# Patient Record
Sex: FEMALE | Race: WHITE | NOT HISPANIC OR LATINO | Employment: FULL TIME | URBAN - METROPOLITAN AREA
[De-identification: names, ages, dates, MRNs, and addresses within clinical notes are randomized per-mention and may not be internally consistent; named-entity substitution may affect disease eponyms.]

---

## 2017-02-15 ENCOUNTER — TELEPHONE (OUTPATIENT)
Dept: FAMILY MEDICINE | Facility: CLINIC | Age: 42
End: 2017-02-15

## 2017-02-15 DIAGNOSIS — Z30.41 ENCOUNTER FOR SURVEILLANCE OF CONTRACEPTIVE PILLS: ICD-10-CM

## 2017-02-15 RX ORDER — DESOGESTREL AND ETHINYL ESTRADIOL 0.15-0.03
1 KIT ORAL DAILY
Qty: 84 TABLET | Refills: 3 | Status: SHIPPED | OUTPATIENT
Start: 2017-02-15 | End: 2017-12-21

## 2017-02-15 NOTE — TELEPHONE ENCOUNTER
Spoke with patient  she needs refill of pres OC,  Does not want to start new one before her wedding  Is still on Apri, Rx sent  JUVENCIO Simms  RN/Malik Man

## 2017-03-09 ENCOUNTER — OFFICE VISIT (OUTPATIENT)
Dept: FAMILY MEDICINE | Facility: CLINIC | Age: 42
End: 2017-03-09
Payer: COMMERCIAL

## 2017-03-09 VITALS
HEIGHT: 62 IN | HEART RATE: 60 BPM | TEMPERATURE: 97.6 F | SYSTOLIC BLOOD PRESSURE: 102 MMHG | WEIGHT: 108.6 LBS | BODY MASS INDEX: 19.98 KG/M2 | DIASTOLIC BLOOD PRESSURE: 64 MMHG

## 2017-03-09 DIAGNOSIS — Z13.6 CARDIOVASCULAR SCREENING; LDL GOAL LESS THAN 160: ICD-10-CM

## 2017-03-09 DIAGNOSIS — Z80.3 FAMILY HISTORY OF MALIGNANT NEOPLASM OF BREAST: ICD-10-CM

## 2017-03-09 DIAGNOSIS — Z01.419 ENCOUNTER FOR GYNECOLOGICAL EXAMINATION WITHOUT ABNORMAL FINDING: Primary | ICD-10-CM

## 2017-03-09 DIAGNOSIS — L30.9 DERMATITIS: ICD-10-CM

## 2017-03-09 DIAGNOSIS — Z30.09 ENCOUNTER FOR OTHER GENERAL COUNSELING OR ADVICE ON CONTRACEPTION: ICD-10-CM

## 2017-03-09 DIAGNOSIS — Z30.41 ENCOUNTER FOR SURVEILLANCE OF CONTRACEPTIVE PILLS: ICD-10-CM

## 2017-03-09 LAB
ANION GAP SERPL CALCULATED.3IONS-SCNC: 6 MMOL/L (ref 3–14)
BUN SERPL-MCNC: 16 MG/DL (ref 7–30)
CALCIUM SERPL-MCNC: 8.4 MG/DL (ref 8.5–10.1)
CHLORIDE SERPL-SCNC: 103 MMOL/L (ref 94–109)
CHOLEST SERPL-MCNC: 171 MG/DL
CO2 SERPL-SCNC: 27 MMOL/L (ref 20–32)
CREAT SERPL-MCNC: 0.68 MG/DL (ref 0.52–1.04)
GFR SERPL CREATININE-BSD FRML MDRD: ABNORMAL ML/MIN/1.7M2
GLUCOSE SERPL-MCNC: 88 MG/DL (ref 70–99)
HDLC SERPL-MCNC: 51 MG/DL
LDLC SERPL CALC-MCNC: 101 MG/DL
NONHDLC SERPL-MCNC: 120 MG/DL
POTASSIUM SERPL-SCNC: 3.8 MMOL/L (ref 3.4–5.3)
SODIUM SERPL-SCNC: 136 MMOL/L (ref 133–144)
TRIGL SERPL-MCNC: 96 MG/DL

## 2017-03-09 PROCEDURE — 36415 COLL VENOUS BLD VENIPUNCTURE: CPT | Performed by: NURSE PRACTITIONER

## 2017-03-09 PROCEDURE — 80061 LIPID PANEL: CPT | Performed by: NURSE PRACTITIONER

## 2017-03-09 PROCEDURE — G0124 SCREEN C/V THIN LAYER BY MD: HCPCS | Performed by: NURSE PRACTITIONER

## 2017-03-09 PROCEDURE — 80048 BASIC METABOLIC PNL TOTAL CA: CPT | Performed by: NURSE PRACTITIONER

## 2017-03-09 PROCEDURE — 87624 HPV HI-RISK TYP POOLED RSLT: CPT | Performed by: NURSE PRACTITIONER

## 2017-03-09 PROCEDURE — G0145 SCR C/V CYTO,THINLAYER,RESCR: HCPCS | Performed by: NURSE PRACTITIONER

## 2017-03-09 PROCEDURE — 99396 PREV VISIT EST AGE 40-64: CPT | Performed by: NURSE PRACTITIONER

## 2017-03-09 RX ORDER — TRIAMCINOLONE ACETONIDE 1 MG/G
CREAM TOPICAL
Qty: 30 G | Refills: 0 | Status: SHIPPED | OUTPATIENT
Start: 2017-03-09 | End: 2017-12-22

## 2017-03-09 NOTE — PATIENT INSTRUCTIONS
Preventive Health Recommendations  Female Ages 40 to 49    Yearly exam:     See your health care provider every year in order to  1. Review health changes.   2. Discuss preventive care.    3. Review your medicines if your doctor prescribed any.      Get a Pap test every three years (unless you have an abnormal result and your provider advises testing more often).      If you get Pap tests with HPV test, you only need to test every 5 years, unless you have an abnormal result. You do not need a Pap test if your uterus was removed (hysterectomy) and you have not had cancer.      You should be tested each year for STDs (sexually transmitted diseases), if you're at risk.       Ask your doctor if you should have a mammogram.      Have a colonoscopy (test for colon cancer) if someone in your family has had colon cancer or polyps before age 50.       Have a cholesterol test every 5 years.       Have a diabetes test (fasting glucose) after age 45. If you are at risk for diabetes, you should have this test every 3 years.    Shots: Get a flu shot each year. Get a tetanus shot every 10 years.     Nutrition:     Eat at least 5 servings of fruits and vegetables each day.    Eat whole-grain bread, whole-wheat pasta and brown rice instead of white grains and rice.    Talk to your provider about Calcium and Vitamin D.     Lifestyle    Exercise at least 150 minutes a week (an average of 30 minutes a day, 5 days a week). This will help you control your weight and prevent disease.    Limit alcohol to one drink per day.    No smoking.     Wear sunscreen to prevent skin cancer.    See your dentist every six months for an exam and cleaning.    You do have a referral for Derm  To check the rash on the chest.

## 2017-03-09 NOTE — PROGRESS NOTES
SUBJECTIVE:     CC: Jessie Karimi is an 41 year old woman who presents for preventive health visit.   Answers for HPI/ROS submitted by the patient on 3/6/2017   Annual Exam:  Getting at least 3 servings of Calcium per day:: Yes  Bi-annual eye exam:: NO  Dental care twice a year:: Yes  Sleep apnea or symptoms of sleep apnea:: None  Frequency of exercise:: 2-3 days/week  Taking medications regularly:: Yes  Medication side effects:: Not applicable  Additional concerns today:: No  Q1: Little interest or pleasure in doing things: 0=Not at all  Q2: Feeling down, depressed or hopeless: 0=Not at all  PHQ-2 Score: 0  Duration of exercise:: 45-60 minutes    *Is fasting.     *No healthcare concerns.     Today's PHQ-2 Score:   PHQ-2 ( 1999 [employer]) 3/9/2017 3/6/2017   Q1: Little interest or pleasure in doing things 0 -   Q2: Feeling down, depressed or hopeless 0 -   PHQ-2 Score 0 -   Little interest or pleasure in doing things - Not at all   Feeling down, depressed or hopeless - Not at all   PHQ-2 Score - 0       Abuse: Current or Past(Physical, Sexual or Emotional)- No  Do you feel safe in your environment - Yes    Social History   Substance Use Topics     Smoking status: Never Smoker     Smokeless tobacco: Never Used     Alcohol use Yes      Comment: occasionaly     The patient does not drink >3 drinks per day nor >7 drinks per week.    Recent Labs   Lab Test  03/04/16   0840  03/13/15   0825  02/20/13   1000   CHOL  176  144  178   HDL  67  66  64   LDL  84  54  99   TRIG  127  119  74   CHOLHDLRATIO   --   2.2  3.0   NHDL  109   --    --        Reviewed orders with patient.  Reviewed health maintenance and updated orders accordingly - Yes    Mammo Decision Support:  Patient under age 50, mutual decision reflected in health maintenance.      Pertinent mammograms are reviewed under the imaging tab.  History of abnormal Pap smear: YES - updated in Problem List and Health Maintenance accordingly    Reviewed and updated  as needed this visit by clinical staff  Tobacco  Allergies  Med Hx  Surg Hx  Fam Hx  Soc Hx        Reviewed and updated as needed this visit by Provider            ROS:  C: NEGATIVE for fever, chills, change in weight  INTEGUMENTARY/SKIN: rash on her chest that doesn't go away   E: NEGATIVE for vision changes or irritation  ENT: NEGATIVE for ear, mouth and throat problems  R: NEGATIVE for significant cough or SOB  B: NEGATIVE for masses, tenderness or discharge  CV: NEGATIVE for chest pain, palpitations or peripheral edema  GI: NEGATIVE for nausea, abdominal pain, heartburn, or change in bowel habits  : NEGATIVE for unusual urinary or vaginal symptoms. Periods are regular.   female: no unusual urinary symptoms, no unusual vaginal symptoms   M: NEGATIVE for significant arthralgias or myalgia  N: NEGATIVE for weakness, dizziness or paresthesias  E: NEGATIVE for temperature intolerance, skin/hair changes  H: NEGATIVE for bleeding problems  P: NEGATIVE for changes in mood or affect    BP Readings from Last 3 Encounters:   03/09/17 102/64   03/22/16 106/69   03/04/16 104/64    Wt Readings from Last 3 Encounters:   03/09/17 108 lb 9.6 oz (49.3 kg)   03/22/16 113 lb 3.2 oz (51.3 kg)   03/04/16 109 lb 9.6 oz (49.7 kg)                  Patient Active Problem List   Diagnosis     Other acne     Encounter for other general counseling or advice on contraception     Allergic     CARDIOVASCULAR SCREENING; LDL GOAL LESS THAN 160     Environmental allergies     Health Care Home     Tree nut allergy     Encounter for surveillance of contraceptive pills     ASCUS with positive high risk HPV     DUB (dysfunctional uterine bleeding)     Past Surgical History   Procedure Laterality Date     C nonspecific procedure       wisdom teeth removed     Biopsy  April 2013     moles removed by Naz and biopsied; benign       Social History   Substance Use Topics     Smoking status: Never Smoker     Smokeless tobacco: Never Used      "Alcohol use Yes      Comment: occasionaly     Family History   Problem Relation Age of Onset     C.A.D. Maternal Grandmother      CEREBROVASCULAR DISEASE Maternal Grandmother      DIABETES Father      Type II     DIABETES Mother      Type II     Breast Cancer Mother      Diagnosed Aug 2012; double mast + chem; remiss.     Heart Failure Mother      DIABETES Maternal Uncle      Hypertension No family hx of      Prostate Cancer No family hx of      Cancer - colorectal No family hx of          Current Outpatient Prescriptions   Medication Sig Dispense Refill     triamcinolone (KENALOG) 0.1 % cream Apply sparingly to affected area three times daily for 14 days. 30 g 0     desogestrel-ethinyl estradiol (APRI) 0.15-30 MG-MCG per tablet Take 1 tablet by mouth daily . Appt Due 3/20/15. 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 45 tablet 2     Cholecalciferol (VITAMIN D PO) Take 1,000 Units by mouth daily       Ascorbic Acid (VITAMIN C PO) Take by mouth daily       CRANBERRY PO Take by mouth daily       ZYRTEC 10 MG OR TABS 1 TABLET DAILY 30 0     EPINEPHrine (EPIPEN) 0.3 MG/0.3ML injection Inject 0.3 mLs (0.3 mg) into the muscle once as needed for anaphylaxis (Patient not taking: Reported on 3/9/2017) 1 each 2     EPINEPHrine (EPIPEN) 0.3 MG/0.3ML injection Inject 0.3 mLs (0.3 mg) into the muscle once as needed for anaphylaxis Adult dose (Patient not taking: Reported on 3/9/2017) 2 each 1     Allergies   Allergen Reactions     Quinolones      OBJECTIVE:     /64 (BP Location: Left arm, Patient Position: Chair, Cuff Size: Adult Regular)  Pulse 60  Temp 97.6  F (36.4  C) (Tympanic)  Ht 5' 1.5\" (1.562 m)  Wt 108 lb 9.6 oz (49.3 kg)  LMP 02/12/2017 (Exact Date)  Breastfeeding? No  BMI 20.19 kg/m2  EXAM:  GENERAL: healthy, alert and no distress  EYES: Eyes grossly normal to " inspection, PERRL and conjunctivae and sclerae normal  HENT: ear canals and TM's normal, nose and mouth without ulcers or lesions  NECK: no adenopathy, no asymmetry, masses, or scars and thyroid normal to palpation  RESP: lungs clear to auscultation - no rales, rhonchi or wheezes  BREAST: normal without masses, tenderness or nipple discharge and no palpable axillary masses or adenopathy  CV: regular rate and rhythm, normal S1 S2, no S3 or S4, no murmur, click or rub, no peripheral edema and peripheral pulses strong  ABDOMEN: soft, nontender, no hepatosplenomegaly, no masses and bowel sounds normal   (female): normal female external genitalia, normal urethral meatus, vaginal mucosa pink, moist, well rugated, and normal cervix/adnexa/uterus without masses or discharge  MS: no gross musculoskeletal defects noted, no edema  SKIN: does have a red rash on the chest.   NEURO: Normal strength and tone, mentation intact and speech normal  PSYCH: mentation appears normal, affect normal/bright  LYMPH: no cervical, supraclavicular, axillary, or inguinal adenopathy    ASSESSMENT/PLAN:       ASSESSMENT/PLAN:      ICD-10-CM    1. Encounter for gynecological examination without abnormal finding Z01.419 Pap imaged thin layer screen with HPV - recommended age 30 - 65     HPV High Risk Types DNA Cervical     Basic metabolic panel  (Ca, Cl, CO2, Creat, Gluc, K, Na, BUN)   2. Dermatitis L30.9 triamcinolone (KENALOG) 0.1 % cream     DERMATOLOGY REFERRAL   3. CARDIOVASCULAR SCREENING; LDL GOAL LESS THAN 160 Z13.6 Basic metabolic panel  (Ca, Cl, CO2, Creat, Gluc, K, Na, BUN)     Lipid Profile with reflex to direct LDL   4. Encounter for other general counseling or advice on contraception Z30.09    5. Encounter for surveillance of contraceptive pills Z30.41    6. Family history of malignant neoplasm of breast Z80.3 *MA Screening Digital Bilateral       Patient Instructions     Preventive Health Recommendations  Female Ages 40 to  49    Yearly exam:     See your health care provider every year in order to  1. Review health changes.   2. Discuss preventive care.    3. Review your medicines if your doctor prescribed any.      Get a Pap test every three years (unless you have an abnormal result and your provider advises testing more often).      If you get Pap tests with HPV test, you only need to test every 5 years, unless you have an abnormal result. You do not need a Pap test if your uterus was removed (hysterectomy) and you have not had cancer.      You should be tested each year for STDs (sexually transmitted diseases), if you're at risk.       Ask your doctor if you should have a mammogram.      Have a colonoscopy (test for colon cancer) if someone in your family has had colon cancer or polyps before age 50.       Have a cholesterol test every 5 years.       Have a diabetes test (fasting glucose) after age 45. If you are at risk for diabetes, you should have this test every 3 years.    Shots: Get a flu shot each year. Get a tetanus shot every 10 years.     Nutrition:     Eat at least 5 servings of fruits and vegetables each day.    Eat whole-grain bread, whole-wheat pasta and brown rice instead of white grains and rice.    Talk to your provider about Calcium and Vitamin D.     Lifestyle    Exercise at least 150 minutes a week (an average of 30 minutes a day, 5 days a week). This will help you control your weight and prevent disease.    Limit alcohol to one drink per day.    No smoking.     Wear sunscreen to prevent skin cancer.    See your dentist every six months for an exam and cleaning.    You do have a referral for Derm  To check the rash on the chest.              '    COUNSELING:   Reviewed preventive health counseling, as reflected in patient instructions       Regular exercise       Healthy diet/nutrition       Contraception         reports that she has never smoked. She has never used smokeless tobacco.    Estimated body mass  "index is 20.19 kg/(m^2) as calculated from the following:    Height as of this encounter: 5' 1.5\" (1.562 m).    Weight as of this encounter: 108 lb 9.6 oz (49.3 kg).       Counseling Resources:  ATP IV Guidelines  Pooled Cohorts Equation Calculator  Breast Cancer Risk Calculator  FRAX Risk Assessment  ICSI Preventive Guidelines  Dietary Guidelines for Americans, 2010  USDA's MyPlate  ASA Prophylaxis  Lung CA Screening    CINDY MARTINEZ NP, APRN CNP  [facility]  "

## 2017-03-09 NOTE — MR AVS SNAPSHOT
After Visit Summary   3/9/2017    Jessie Karimi    MRN: [id number]           Patient Information     Date Of Birth          1975        Visit Information        Provider Department      3/9/2017 8:40 AM Naz Ríos APRN [facility]        Today's Diagnoses     Encounter for gynecological examination without abnormal finding    -  1    Dermatitis        CARDIOVASCULAR SCREENING; LDL GOAL LESS THAN 160        Encounter for other general counseling or advice on contraception        Encounter for surveillance of contraceptive pills        Family history of malignant neoplasm of breast          Care Instructions      Preventive Health Recommendations  Female Ages 40 to 49    Yearly exam:     See your health care provider every year in order to  1. Review health changes.   2. Discuss preventive care.    3. Review your medicines if your doctor prescribed any.      Get a Pap test every three years (unless you have an abnormal result and your provider advises testing more often).      If you get Pap tests with HPV test, you only need to test every 5 years, unless you have an abnormal result. You do not need a Pap test if your uterus was removed (hysterectomy) and you have not had cancer.      You should be tested each year for STDs (sexually transmitted diseases), if you're at risk.       Ask your doctor if you should have a mammogram.      Have a colonoscopy (test for colon cancer) if someone in your family has had colon cancer or polyps before age 50.       Have a cholesterol test every 5 years.       Have a diabetes test (fasting glucose) after age 45. If you are at risk for diabetes, you should have this test every 3 years.    Shots: Get a flu shot each year. Get a tetanus shot every 10 years.     Nutrition:     Eat at least 5 servings of fruits and vegetables each day.    Eat whole-grain bread, whole-wheat pasta and brown rice instead of white grains and  rice.    Talk to your provider about Calcium and Vitamin D.     Lifestyle    Exercise at least 150 minutes a week (an average of 30 minutes a day, 5 days a week). This will help you control your weight and prevent disease.    Limit alcohol to one drink per day.    No smoking.     Wear sunscreen to prevent skin cancer.    See your dentist every six months for an exam and cleaning.    You do have a referral for Derm  To check the rash on the chest.              Follow-ups after your visit        Additional Services     DERMATOLOGY REFERRAL       Your provider has referred you to: [facility]: [facility] [phone]   [email or web address]  [facility]: [facility] [place] [phone]  [email or web address]  [facility]: [facility] [phone]   [email or web address]    Please be aware that coverage of these services is subject to the terms and limitations of your health insurance plan.  Call member services at your health plan with any benefit or coverage questions.      Please bring the following with you to your appointment:    (1) Any X-Rays, CTs or MRIs which have been performed.  Contact the facility where they were done to arrange for  prior to your scheduled appointment.    (2) List of current medications  (3) This referral request   (4) Any documents/labs given to you for this referral                  Future tests that were ordered for you today     Open Future Orders        Priority Expected Expires Ordered    *MA Screening Digital Bilateral Routine  3/9/2018 3/9/2017            Who to contact     Normal or non-critical lab and imaging results will be communicated to you by MyChart, letter or phone within 4 business days after the clinic has received the results. If you do not hear from us within 7 days, please contact the clinic through MyChart or phone. If you have a critical or abnormal lab result, we  "will notify you by phone as soon as possible.  Submit refill requests through [employer] or call your pharmacy and they will forward the refill request to us. Please allow 3 business days for your refill to be completed.          If you need to speak with a  for additional information , please call: [phone]           Additional Information About Your Visit        [facility] Information     [employer] gives you secure access to your electronic health record. If you see a primary care provider, you can also send messages to your care team and make appointments. If you have questions, please call your primary care clinic.  If you do not have a primary care provider, please call [phone] and they will assist you.        Care EveryWhere ID     This is your Care EveryWhere ID. This could be used by other organizations to access your [place] medical records  [id number]        Your Vitals Were     Pulse Temperature Height Last Period Breastfeeding? BMI (Body Mass Index)    60 97.6  F (36.4  C) (Tympanic) 5' 1.5\" (1.562 m) 02/12/2017 (Exact Date) No 20.19 kg/m2       Blood Pressure from Last 3 Encounters:   03/09/17 102/64   03/22/16 106/69   03/04/16 104/64    Weight from Last 3 Encounters:   03/09/17 108 lb 9.6 oz (49.3 kg)   03/22/16 113 lb 3.2 oz (51.3 kg)   03/04/16 109 lb 9.6 oz (49.7 kg)              We Performed the Following     Basic metabolic panel  (Ca, Cl, CO2, Creat, Gluc, K, Na, BUN)     DERMATOLOGY REFERRAL     HPV High Risk Types DNA Cervical     Lipid Profile with reflex to direct LDL     Pap imaged thin layer screen with HPV - recommended age 30 - 65          Today's Medication Changes          These changes are accurate as of: 3/9/17  9:41 AM.  If you have any questions, ask your nurse or doctor.               Start taking these medicines.        Dose/Directions    triamcinolone 0.1 % cream   Commonly known as:  KENALOG   Used for:  Dermatitis   Started by:  Naz Ríos, " APRN CNP        Apply sparingly to affected area three times daily for 14 days.   Quantity:  30 g   Refills:  0            Where to get your medicines      These medications were sent to [facility] [id number] [facility] - [place], [place] - [address]  [address], [place] [address]     Phone:  [phone]     triamcinolone 0.1 % cream                Primary Care Provider Office Phone # Fax #    Naz Morris GEORGES Lemon [phone]       [facility] [address]   [place] [address]        Thank you!     Thank you for choosing [facility]  for your care. Our goal is always to provide you with excellent care. Hearing back from our patients is one way we can continue to improve our services. Please take a few minutes to complete the written survey that you may receive in the mail after your visit with us. Thank you!             Your Updated Medication List - Protect others around you: Learn how to safely use, store and throw away your medicines at www.disposemymeds.org.          This list is accurate as of: 3/9/17  9:41 AM.  Always use your most recent med list.                   Brand Name Dispense Instructions for use    CRANBERRY PO      Take by mouth daily       desogestrel-ethinyl estradiol 0.15-30 MG-MCG per tablet    APRI    84 tablet    Take 1 tablet by mouth daily . Appt Due 3/20/15.       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       VITAMIN C PO      Take by mouth daily       VITAMIN D PO      Take 1,000 Units by mouth daily       ZYRTEC 10 MG tablet   Generic drug:  cetirizine     30    1 TABLET DAILY       * Notice:  This list has 2 medication(s) that are the same as other medications prescribed for you. Read the directions carefully, and ask your doctor or other care provider to review them with you.

## 2017-03-09 NOTE — NURSING NOTE
"Chief Complaint   Patient presents with     Physical       Initial /64 (BP Location: Left arm, Patient Position: Chair, Cuff Size: Adult Regular)  Pulse 60  Temp 97.6  F (36.4  C) (Tympanic)  Ht 5' 1.5\" (1.562 m)  Wt 108 lb 9.6 oz (49.3 kg)  LMP 02/12/2017 (Exact Date)  Breastfeeding? No  BMI 20.19 kg/m2 Estimated body mass index is 20.19 kg/(m^2) as calculated from the following:    Height as of this encounter: 5' 1.5\" (1.562 m).    Weight as of this encounter: 108 lb 9.6 oz (49.3 kg).  Medication Reconciliation: complete     Era Bowden CMA (AAMA)      "

## 2017-03-15 LAB
COPATH REPORT: ABNORMAL
PAP: ABNORMAL

## 2017-03-17 LAB
FINAL DIAGNOSIS: NORMAL
HPV HR 12 DNA CVX QL NAA+PROBE: NEGATIVE
HPV16 DNA SPEC QL NAA+PROBE: NEGATIVE
HPV18 DNA SPEC QL NAA+PROBE: NEGATIVE
SPECIMEN DESCRIPTION: NORMAL

## 2017-03-27 DIAGNOSIS — F41.8 SITUATIONAL ANXIETY: ICD-10-CM

## 2017-03-27 NOTE — TELEPHONE ENCOUNTER
Propranolol  20mg      Last Written Prescription Date: 12/28/2016 #45 x 2  Last filled 02/22/2017  Last Office Visit with [facility], [facility] or [facility] prescribing provider:  03/09/2017 EJ Ríos   Future Office Visit:    none    BP Readings from Last 3 Encounters:   03/09/17 102/64   03/22/16 106/69   03/04/16 104/64

## 2017-03-28 RX ORDER — PROPRANOLOL HYDROCHLORIDE 20 MG/1
20 TABLET ORAL 2 TIMES DAILY
Qty: 45 TABLET | Refills: 2 | Status: SHIPPED | OUTPATIENT
Start: 2017-03-28 | End: 2017-08-19

## 2017-03-28 NOTE — TELEPHONE ENCOUNTER
Prescription approved per [facility] Refill Protocol or patient Primary care provider (PCP)  JUVENCIO Simms RN/Malik Man

## 2017-05-05 ENCOUNTER — TELEPHONE (OUTPATIENT)
Dept: FAMILY MEDICINE | Facility: CLINIC | Age: 42
End: 2017-05-05

## 2017-05-05 NOTE — TELEPHONE ENCOUNTER
Patient returned our call, and I read the message to her. She will check with her pharmacy, with the Lot # on her pen.  Rose Marie Dolan  [facility] [place] [facility]

## 2017-05-05 NOTE — TELEPHONE ENCOUNTER
Called patient to have her check with her pharmacy if her most recent Epipen was part of the recent recall. I left a message for her to return my call.  She will need the lot# when she calls the pharmacy.   Betty Rubi CMA

## 2017-05-17 ENCOUNTER — OFFICE VISIT (OUTPATIENT)
Dept: OBGYN | Facility: CLINIC | Age: 42
End: 2017-05-17
Payer: COMMERCIAL

## 2017-05-17 VITALS
BODY MASS INDEX: 19.7 KG/M2 | DIASTOLIC BLOOD PRESSURE: 76 MMHG | TEMPERATURE: 98.2 F | WEIGHT: 106 LBS | HEART RATE: 90 BPM | SYSTOLIC BLOOD PRESSURE: 117 MMHG

## 2017-05-17 DIAGNOSIS — R87.612 PAPANICOLAOU SMEAR OF CERVIX WITH LOW GRADE SQUAMOUS INTRAEPITHELIAL LESION (LGSIL): Primary | ICD-10-CM

## 2017-05-17 DIAGNOSIS — R87.810 ASCUS WITH POSITIVE HIGH RISK HPV CERVICAL: ICD-10-CM

## 2017-05-17 DIAGNOSIS — R87.610 ASCUS WITH POSITIVE HIGH RISK HPV CERVICAL: ICD-10-CM

## 2017-05-17 LAB — BETA HCG QUAL IFA URINE: NEGATIVE

## 2017-05-17 PROCEDURE — 88305 TISSUE EXAM BY PATHOLOGIST: CPT | Performed by: OBSTETRICS & GYNECOLOGY

## 2017-05-17 PROCEDURE — 84703 CHORIONIC GONADOTROPIN ASSAY: CPT | Performed by: OBSTETRICS & GYNECOLOGY

## 2017-05-17 PROCEDURE — 57456 ENDOCERV CURETTAGE W/SCOPE: CPT | Performed by: OBSTETRICS & GYNECOLOGY

## 2017-05-17 NOTE — NURSING NOTE
"Chief Complaint   Patient presents with     Colposcopy       Initial /76  Pulse 90  Temp 98.2  F (36.8  C) (Oral)  Wt 106 lb (48.1 kg)  LMP 05/07/2017  BMI 19.7 kg/m2 Estimated body mass index is 19.7 kg/(m^2) as calculated from the following:    Height as of 3/9/17: 5' 1.5\" (1.562 m).    Weight as of this encounter: 106 lb (48.1 kg).  BP completed using cuff size: regular    No obstetric history on file.    The following HM Due: NONE      The following patient reported/Care Every where data was sent to:  P ABSTRACT QUALITY INITIATIVES [[id number]]  na     n/a             "

## 2017-05-17 NOTE — PROGRESS NOTES
GYN CLINIC VISIT  5/17/2017  CC: colposcopy    HPI:  Jessie Karimi is a 41 year old female No obstetric history on file. who presents for initial colposcopy, referred by Naz burroughs. Pap smear on 03/09/2017 showed: LSIL. The prior pap showed ASCUS and with high risk HPV present: +HPV other.       Patient's last menstrual period was 05/07/2017.  UPT today is negative  Patient does not smoke  Type of contraception: oral contraceptive  Age at first sexual intercourse: 20  Number of sexual partners (lifetime): 25  Past GYN history: No STD history and HPV  Prior cervical/vaginal disease: Normal exam without visible pathology.  Prior cervical treatment: no treatment.    Vitals:    05/17/17 1625   BP: 117/76   Pulse: 90   Temp: 98.2  F (36.8  C)   TempSrc: Oral   Weight: 106 lb (48.1 kg)         PROCEDURE:  Before the procedure, it was ensured that the patient was educated regarding the nature of her findings to date, the implications, and what was to be done. She has been made aware of the role of HPV, the natural history of infection, ways to minimize her future risk, the effect of HPV on the cervix, and treatment options available should they be indicated. The details of the colposcopic procedure were reviewed. All questions were answered before proceeding, and informed consent was therefore obtained.      Medium angi peculum placed in vagina and excellent visualization of cervix acheived, cervix swabbed x 3 with acetic acid solution.      FINDINGS:  Cervix: no visible lesions. Nulliparous cervix. Unable to see SCJ.   SCJ seen?: no   ECC done?: Yes - ECC performed because could not see SCJ  Satisfactory examination?: no      ASSESSMENT: Normal exam without visible pathology. ECC performed because could not see SCJ  PLAN: specimens labelled and sent to Pathology, will base further treatment on Pathology findings, treatment options discussed with patient, post biopsy instructions given to patient and call to  discuss Pathology results      Lala Lara MD

## 2017-05-17 NOTE — MR AVS SNAPSHOT
After Visit Summary   5/17/2017    Jessie Karimi    MRN: [id number]           Patient Information     Date Of Birth          1975        Visit Information        Provider Department      5/17/2017 4:30 PM Lala Lara MD; [facility]        Today's Diagnoses     Papanicolaou smear of cervix with low grade squamous intraepithelial lesion (LGSIL)    -  1    ASCUS with positive high risk HPV cervical           Follow-ups after your visit        Who to contact     If you have questions or need follow up information about today's clinic visit or your schedule please contact [facility] directly at [phone].  Normal or non-critical lab and imaging results will be communicated to you by MyChart, letter or phone within 4 business days after the clinic has received the results. If you do not hear from us within 7 days, please contact the clinic through MyChart or phone. If you have a critical or abnormal lab result, we will notify you by phone as soon as possible.  Submit refill requests through [employer] or call your pharmacy and they will forward the refill request to us. Please allow 3 business days for your refill to be completed.          Additional Information About Your Visit        MyChart Information     [employer] gives you secure access to your electronic health record. If you see a primary care provider, you can also send messages to your care team and make appointments. If you have questions, please call your primary care clinic.  If you do not have a primary care provider, please call [phone] and they will assist you.        Care EveryWhere ID     This is your Care EveryWhere ID. This could be used by other organizations to access your [facility] medical records  [id number]        Your Vitals Were     Pulse Temperature Last Period BMI (Body Mass Index)          90 98.2  F (36.8  C) (Oral) 05/07/2017 19.7 kg/m2         Blood  Pressure from Last 3 Encounters:   05/17/17 117/76   03/09/17 102/64   03/22/16 106/69    Weight from Last 3 Encounters:   05/17/17 106 lb (48.1 kg)   03/09/17 108 lb 9.6 oz (49.3 kg)   03/22/16 113 lb 3.2 oz (51.3 kg)              We Performed the Following     Beta HCG qual IFA urine     COLP CERVIX/UPPER VAGINA W ENDOCERV CURETT     Surgical pathology exam          Today's Medication Changes          These changes are accurate as of: 5/17/17  5:55 PM.  If you have any questions, ask your nurse or doctor.               These medicines have changed or have updated prescriptions.        Dose/Directions    EPINEPHrine 0.3 MG/0.3ML injection   Commonly known as:  EPIPEN   This may have changed:  Another medication with the same name was removed. Continue taking this medication, and follow the directions you see here.   Used for:  Allergic   Changed by:  Naz Ríos APRN CNP        Dose:  0.3 mg   Inject 0.3 mLs (0.3 mg) into the muscle once as needed for anaphylaxis Adult dose   Quantity:  2 each   Refills:  1                Primary Care Provider Office Phone # Fax #    GEORGES Garza [phone]       [facility] [address]   [place] [address]        Thank you!     Thank you for choosing [facility]  for your care. Our goal is always to provide you with excellent care. Hearing back from our patients is one way we can continue to improve our services. Please take a few minutes to complete the written survey that you may receive in the mail after your visit with us. Thank you!             Your Updated Medication List - Protect others around you: Learn how to safely use, store and throw away your medicines at www.disposemymeds.org.          This list is accurate as of: 5/17/17  5:55 PM.  Always use your most recent med list.                   Brand Name Dispense Instructions for use    CRANBERRY PO      Take by mouth daily        desogestrel-ethinyl estradiol 0.15-30 MG-MCG per tablet    APRI    84 tablet    Take 1 tablet by mouth daily . Appt Due 3/20/15.       EPINEPHrine 0.3 MG/0.3ML injection    EPIPEN    2 each    Inject 0.3 mLs (0.3 mg) into the muscle once as needed for anaphylaxis Adult dose       propranolol 20 MG tablet    INDERAL    45 tablet    Take 1 tablet (20 mg) by mouth 2 times daily Take 1 tablet  30-60 min before a speaking event       triamcinolone 0.1 % cream    KENALOG    30 g    Apply sparingly to affected area three times daily for 14 days.       VITAMIN C PO      Take by mouth daily       VITAMIN D PO      Take 1,000 Units by mouth daily       ZYRTEC 10 MG tablet   Generic drug:  cetirizine     30    1 TABLET DAILY

## 2017-05-19 LAB — COPATH REPORT: NORMAL

## 2017-08-07 ENCOUNTER — MYC MEDICAL ADVICE (OUTPATIENT)
Dept: FAMILY MEDICINE | Facility: CLINIC | Age: 42
End: 2017-08-07

## 2017-08-07 DIAGNOSIS — B37.31 YEAST INFECTION OF THE VAGINA: Primary | ICD-10-CM

## 2017-08-07 RX ORDER — FLUCONAZOLE 150 MG/1
150 TABLET ORAL ONCE
Qty: 2 TABLET | Refills: 0 | Status: SHIPPED | OUTPATIENT
Start: 2017-08-07 | End: 2017-08-07

## 2017-08-07 NOTE — TELEPHONE ENCOUNTER
Patient is calling back to see what we cant do to help her with her yeast infection.    Rose Marie Verde, [facility]

## 2017-08-19 DIAGNOSIS — F41.8 SITUATIONAL ANXIETY: ICD-10-CM

## 2017-08-21 RX ORDER — PROPRANOLOL HYDROCHLORIDE 20 MG/1
TABLET ORAL
Qty: 45 TABLET | Refills: 2 | Status: SHIPPED | OUTPATIENT
Start: 2017-08-21 | End: 2017-10-29

## 2017-08-21 NOTE — TELEPHONE ENCOUNTER
Propranolol  20mg      Last Written Prescription Date: 03/28/2017  #45 x 2  Last filled 07/25/2017  Last Office Visit with [facility], [facility] or [facility] prescribing provider:  03/09/2017 EJ Ríos   Future Office Visit:    none    BP Readings from Last 3 Encounters:   05/17/17 117/76   03/09/17 102/64   03/22/16 106/69

## 2017-10-10 DIAGNOSIS — Z91.018 TREE NUT ALLERGY: ICD-10-CM

## 2017-10-10 NOTE — TELEPHONE ENCOUNTER
Epi Pen      Last Written Prescription Date: 02/28/2014 #2 x 1  Last filled 08/05/2016  Last Office Visit with [facility], [facility] or [facility] prescribing provider: 03/09/2017 EJ Ríos

## 2017-10-11 RX ORDER — EPINEPHRINE 0.3 MG/.3ML
INJECTION INTRAMUSCULAR
Qty: 0.6 ML | Refills: 0 | Status: SHIPPED | OUTPATIENT
Start: 2017-10-11 | End: 2018-05-11

## 2017-10-11 NOTE — TELEPHONE ENCOUNTER
Prescription approved per [facility] Refill Protocol or patient Primary care provider (PCP)  JUVENCIO Simms RN/Malik Man

## 2017-10-12 DIAGNOSIS — Z91.018 TREE NUT ALLERGY: Primary | ICD-10-CM

## 2017-10-12 RX ORDER — EPINEPHRINE 0.3 MG/.3ML
0.3 INJECTION SUBCUTANEOUS PRN
Qty: 0.6 ML | Refills: 1 | Status: SHIPPED | OUTPATIENT
Start: 2017-10-12 | End: 2018-05-11

## 2017-12-13 ENCOUNTER — E-VISIT (OUTPATIENT)
Dept: FAMILY MEDICINE | Facility: CLINIC | Age: 42
End: 2017-12-13
Payer: COMMERCIAL

## 2017-12-13 DIAGNOSIS — B37.31 YEAST INFECTION OF THE VAGINA: Primary | ICD-10-CM

## 2017-12-13 PROCEDURE — 99207 ZZC NO BILLABLE SERVICE THIS VISIT: CPT | Performed by: NURSE PRACTITIONER

## 2017-12-13 NOTE — MR AVS SNAPSHOT
After Visit Summary   12/13/2017    Jessie Karimi    MRN: [id number]           Patient Information     Date Of Birth          1975        Visit Information        Provider Department      12/13/2017 11:00 AM Naz Ríos APRN CNP [facility]        Today's Diagnoses     Yeast infection of the vagina    -  1       Follow-ups after your visit        Your next 10 appointments already scheduled     Dec 22, 2017 10:00 AM CST   Office Visit with GEORGES Patel CNP   [facility] ([facility])    [address] [place] [id number] [phone]           Bring a current list of meds and any records pertaining to this visit. For Physicals, please bring immunization records and any forms needing to be filled out. Please arrive 10 minutes early to complete paperwork.              Who to contact     Normal or non-critical lab and imaging results will be communicated to you by DoYouRememberhart, letter or phone within 4 business days after the clinic has received the results. If you do not hear from us within 7 days, please contact the clinic through [employer] or phone. If you have a critical or abnormal lab result, we will notify you by phone as soon as possible.  Submit refill requests through [employer] or call your pharmacy and they will forward the refill request to us. Please allow 3 business days for your refill to be completed.          If you need to speak with a  for additional information , please call: [phone]           Additional Information About Your Visit        [employer] Information     [employer] gives you secure access to your electronic health record. If you see a primary care provider, you can also send messages to your care team and make appointments. If you have questions, please call your primary care clinic.  If you do not have a primary care provider, please call [phone] and  they will assist you.        Care EveryWhere ID     This is your Care EveryWhere ID. This could be used by other organizations to access your [facility] medical records  [id number]         Blood Pressure from Last 3 Encounters:   05/17/17 117/76   03/09/17 102/64   03/22/16 106/69    Weight from Last 3 Encounters:   05/17/17 106 lb (48.1 kg)   03/09/17 108 lb 9.6 oz (49.3 kg)   03/22/16 113 lb 3.2 oz (51.3 kg)              Today, you had the following     No orders found for display         Today's Medication Changes          These changes are accurate as of: 12/13/17 11:59 PM.  If you have any questions, ask your nurse or doctor.               Start taking these medicines.        Dose/Directions    fluconazole 150 MG tablet   Commonly known as:  DIFLUCAN   Used for:  Yeast infection of the vagina        Dose:  150 mg   Take 1 tablet (150 mg) by mouth once for 1 dose   Quantity:  1 tablet   Refills:  0            Where to get your medicines      These medications were sent to [place] [address] [place] [address] [address]     Phone:  [phone]     fluconazole 150 MG tablet                Primary Care Provider Office Phone # Fax #    Naz Ríos, GEORGES [facility] [phone] [address] [place] [address]        Equal Access to Services     DELFIN GUILLERMO AH: Hadii jimmy berkowitzo Soyoel, waaxda luqadaha, qaybta kaalmada jennifer, gerardo bernstein. So [facility] [phone].    ATENCIÓN: Si habla español, tiene a jha disposición servicios gratuitos de asistencia lingüística. Dennis al [phone].    We comply with applicable federal civil rights laws and [place] laws. We do not discriminate on the basis of race, color, national origin, age, disability, sex, sexual orientation, or gender identity.            Thank you!     [facility]. Our goal is always to  provide you with excellent care. Hearing back from our patients is one way we can continue to improve our services. Please take a few minutes to complete the written survey that you may receive in the mail after your visit with us. Thank you!             Your Updated Medication List - Protect others around you: Learn how to safely use, store and throw away your medicines at www.disposemymeds.org.          This list is accurate as of: 12/13/17 11:59 PM.  Always use your most recent med list.                   Brand Name Dispense Instructions for use Diagnosis    CRANBERRY PO      Take by mouth daily        desogestrel-ethinyl estradiol 0.15-30 MG-MCG per tablet    APRI    84 tablet    Take 1 tablet by mouth daily . Appt Due 3/20/15.    Encounter for surveillance of contraceptive pills       * EPINEPHrine 0.3 MG/0.3ML injection    EPIPEN    2 each    Inject 0.3 mLs (0.3 mg) into the muscle once as needed for anaphylaxis Adult dose    Allergic       * EPIPEN 2-LORI 0.3 MG/0.3ML injection 2-pack   Generic drug:  EPINEPHrine     0.6 mL    INJECT ONE PEN INTRAMUSCULARLY ONCE AS NEEDED FOR ANAPHYLAXIS    Tree nut allergy       * EPINEPHrine 0.3 MG/0.3ML injection 2-pack    EPIPEN/ADRENACLICK/or ANY BX GENERIC EQUIV    0.6 mL    Inject 0.3 mLs (0.3 mg) into the muscle as needed for anaphylaxis    Tree nut allergy       fluconazole 150 MG tablet    DIFLUCAN    1 tablet    Take 1 tablet (150 mg) by mouth once for 1 dose    Yeast infection of the vagina       propranolol 20 MG tablet    INDERAL    45 tablet    TAKE 1 TABLET BY MOUTH TWICE DAILY. TAKE TABLET 30-60 MIN BEFORE SPEAKING EVENT    Situational anxiety       triamcinolone 0.1 % cream    KENALOG    30 g    Apply sparingly to affected area three times daily for 14 days.    Dermatitis       VITAMIN C PO      Take by mouth daily        VITAMIN D PO      Take 1,000 Units by mouth daily        ZYRTEC 10 MG tablet   Generic drug:  cetirizine     30    1 TABLET DAILY        *  Notice:  This list has 3 medication(s) that are the same as other medications prescribed for you. Read the directions carefully, and ask your doctor or other care provider to review them with you.

## 2017-12-18 ENCOUNTER — TELEPHONE (OUTPATIENT)
Dept: FAMILY MEDICINE | Facility: CLINIC | Age: 42
End: 2017-12-18

## 2017-12-18 NOTE — TELEPHONE ENCOUNTER
Pt calling with persisting symptoms. States E-visit was never responded to. Requesting to speak with RN. Please see pt answers to questions..    Su Hartmann, [place]

## 2017-12-18 NOTE — TELEPHONE ENCOUNTER
Vaginal Symptoms      Duration: years  To  6 months  Has had HPV and two biopsies of cervix ,     Description  Feels pressure and discharge about one week before menses and continues through menses and then is gone  States she is prone to vaginal yeast infections  , has tried OTC and they don't work, used Diflucan last time and it worked, currently tried  Douching with hydrogen peroxide , worked for awhile and not does not      Intensity:  Monthly start week before menses     Accompanying signs and symptoms (fever/dysuria/abdominal or back pain): None    History  Sexually active: yes,  on OC for birth control   Possibility of pregnancy: No  Recent antibiotic use: no     Precipitating or alleviating factors: douching     Therapies tried and outcome: Monistat and douche   Outcome: helped for  Awhile then stopped     Discussed offered a appt with [place] but, also discussed GYN appt due to repetition and history  Of abn pap and HPV     Pt desires GYN  appt will call and make one,     May callback as need     JUVENCIO Simms  RN/Malik Man

## 2017-12-18 NOTE — TELEPHONE ENCOUNTER
Pt reports vaginal symptoms persisting, worsening. Pt hasn't gotten a response to her E-visit from 12/13. Requesting to speak with ANITA.    Su Hartmann, [place]

## 2017-12-21 DIAGNOSIS — Z30.41 ENCOUNTER FOR SURVEILLANCE OF CONTRACEPTIVE PILLS: ICD-10-CM

## 2017-12-21 RX ORDER — FLUCONAZOLE 150 MG/1
150 TABLET ORAL ONCE
Qty: 1 TABLET | Refills: 0 | Status: SHIPPED | OUTPATIENT
Start: 2017-12-21 | End: 2017-12-21

## 2017-12-21 RX ORDER — DESOGESTREL AND ETHINYL ESTRADIOL 0.15-0.03
KIT ORAL
Qty: 84 TABLET | Refills: 1 | Status: SHIPPED | OUTPATIENT
Start: 2017-12-21 | End: 2017-12-22

## 2017-12-21 NOTE — TELEPHONE ENCOUNTER
Prescription approved per [facility] Refill Protocol or patient Primary care provider (PCP)  JUVENCIO Simms RN/Malik Man

## 2017-12-22 ENCOUNTER — OFFICE VISIT (OUTPATIENT)
Dept: OBGYN | Facility: CLINIC | Age: 42
End: 2017-12-22
Payer: COMMERCIAL

## 2017-12-22 ENCOUNTER — RADIANT APPOINTMENT (OUTPATIENT)
Dept: MAMMOGRAPHY | Facility: CLINIC | Age: 42
End: 2017-12-22
Payer: COMMERCIAL

## 2017-12-22 VITALS
DIASTOLIC BLOOD PRESSURE: 66 MMHG | SYSTOLIC BLOOD PRESSURE: 104 MMHG | WEIGHT: 116 LBS | HEART RATE: 68 BPM | BODY MASS INDEX: 21.35 KG/M2 | HEIGHT: 62 IN

## 2017-12-22 DIAGNOSIS — Z30.41 ENCOUNTER FOR SURVEILLANCE OF CONTRACEPTIVE PILLS: ICD-10-CM

## 2017-12-22 DIAGNOSIS — N89.8 ITCHING OF VAGINA: Primary | ICD-10-CM

## 2017-12-22 DIAGNOSIS — B37.31 VAGINAL CANDIDA: ICD-10-CM

## 2017-12-22 DIAGNOSIS — Z12.31 VISIT FOR SCREENING MAMMOGRAM: ICD-10-CM

## 2017-12-22 LAB
GRAM STN SPEC: ABNORMAL
SPECIMEN SOURCE: ABNORMAL
SPECIMEN SOURCE: ABNORMAL
WET PREP SPEC: ABNORMAL

## 2017-12-22 PROCEDURE — 87205 SMEAR GRAM STAIN: CPT | Performed by: NURSE PRACTITIONER

## 2017-12-22 PROCEDURE — 87210 SMEAR WET MOUNT SALINE/INK: CPT | Performed by: NURSE PRACTITIONER

## 2017-12-22 PROCEDURE — G0202 SCR MAMMO BI INCL CAD: HCPCS | Mod: TC | Performed by: OBSTETRICS & GYNECOLOGY

## 2017-12-22 PROCEDURE — 99203 OFFICE O/P NEW LOW 30 MIN: CPT | Performed by: NURSE PRACTITIONER

## 2017-12-22 PROCEDURE — 87653 STREP B DNA AMP PROBE: CPT | Performed by: NURSE PRACTITIONER

## 2017-12-22 PROCEDURE — 77063 BREAST TOMOSYNTHESIS BI: CPT | Mod: TC | Performed by: OBSTETRICS & GYNECOLOGY

## 2017-12-22 PROCEDURE — 87106 FUNGI IDENTIFICATION YEAST: CPT | Performed by: NURSE PRACTITIONER

## 2017-12-22 PROCEDURE — 87102 FUNGUS ISOLATION CULTURE: CPT | Performed by: NURSE PRACTITIONER

## 2017-12-22 RX ORDER — DESOGESTREL AND ETHINYL ESTRADIOL 0.15-0.03
1 KIT ORAL DAILY
Qty: 112 TABLET | Refills: 4 | Status: SHIPPED | OUTPATIENT
Start: 2017-12-22 | End: 2018-01-05 | Stop reason: DRUGHIGH

## 2017-12-22 RX ORDER — FLUCONAZOLE 150 MG/1
150 TABLET ORAL
Qty: 4 TABLET | Refills: 0 | Status: SHIPPED | OUTPATIENT
Start: 2017-12-22 | End: 2018-01-05

## 2017-12-22 ASSESSMENT — ANXIETY QUESTIONNAIRES
5. BEING SO RESTLESS THAT IT IS HARD TO SIT STILL: NOT AT ALL
GAD7 TOTAL SCORE: 0
3. WORRYING TOO MUCH ABOUT DIFFERENT THINGS: NOT AT ALL
6. BECOMING EASILY ANNOYED OR IRRITABLE: NOT AT ALL
IF YOU CHECKED OFF ANY PROBLEMS ON THIS QUESTIONNAIRE, HOW DIFFICULT HAVE THESE PROBLEMS MADE IT FOR YOU TO DO YOUR WORK, TAKE CARE OF THINGS AT HOME, OR GET ALONG WITH OTHER PEOPLE: NOT DIFFICULT AT ALL
2. NOT BEING ABLE TO STOP OR CONTROL WORRYING: NOT AT ALL
7. FEELING AFRAID AS IF SOMETHING AWFUL MIGHT HAPPEN: NOT AT ALL
1. FEELING NERVOUS, ANXIOUS, OR ON EDGE: NOT AT ALL

## 2017-12-22 ASSESSMENT — PATIENT HEALTH QUESTIONNAIRE - PHQ9
SUM OF ALL RESPONSES TO PHQ QUESTIONS 1-9: 1
5. POOR APPETITE OR OVEREATING: NOT AT ALL

## 2017-12-22 NOTE — PROGRESS NOTES
SUBJECTIVE:                                                   Jessie Karimi is a 42 year old female who presents to clinic today for the following health issue(s):  Patient presents with:  Vaginal Problem: c/o another possible yeast infection- has had recurring ones for past 6-8 months        HPI:  Pt here in consultation for chronic cyclic yeast infections. She was referred by her primary providers office.     This all started about 6 months ago. She is taking OCP's for contraception.     She states she is asymptomatic the first 2 weeks of her pills, then she starts to get symptoms of internal and external vaginal itching on week 3 of her pills through her period, then things go back to normal.     She has tried monistat 1, 3 and 7 days therapy with no relief. She did a hydrogen peroxide douche with no relief.     Patient's last menstrual period was 12/17/2017 (exact date)..   Patient is sexually active, No obstetric history on file..  Using oral contraceptives for contraception.    reports that she has never smoked. She has never used smokeless tobacco.      STD testing offered?  Declined    Health maintenance updated:  yes    Today's PHQ-2 Score:   PHQ-2 ( 1999 [employer]) 3/9/2017   Q1: Little interest or pleasure in doing things 0   Q2: Feeling down, depressed or hopeless 0   PHQ-2 Score 0   Q1: Little interest or pleasure in doing things -   Q2: Feeling down, depressed or hopeless -   PHQ-2 Score -     Today's PHQ-9 Score:   PHQ-9 SCORE 12/22/2017   Total Score 1     Today's NICOLASA-7 Score:   NICOLASA-7 SCORE 12/22/2017   Total Score 0       Problem list and histories reviewed & adjusted, as indicated.  Additional history: as documented.    Patient Active Problem List   Diagnosis     Other acne     Encounter for other general counseling or advice on contraception     Allergic     CARDIOVASCULAR SCREENING; LDL GOAL LESS THAN 160     Environmental allergies     Health Care Home     Tree nut allergy     Encounter  for surveillance of contraceptive pills     ASCUS with positive high risk HPV cervical     DUB (dysfunctional uterine bleeding)     Past Surgical History:   Procedure Laterality Date     BIOPSY  April 2013    moles removed by Naz and biopsied; benign     C NONSPECIFIC PROCEDURE      wisdom teeth removed      Social History   Substance Use Topics     Smoking status: Never Smoker     Smokeless tobacco: Never Used     Alcohol use Yes      Comment: occasionaly      Problem (# of Occurrences) Relation (Name,Age of Onset)    Breast Cancer (1) Mother (April): Diagnosed Aug 2012; double mast + chem; remiss.    C.A.D. (1) Maternal Grandmother    CEREBROVASCULAR DISEASE (1) Maternal Grandmother    DIABETES (3) Father (Minesh): Type II, Mother (April): Type II, Maternal Uncle    Heart Failure (1) Mother (April)       Negative family history of: Hypertension, Prostate Cancer, Cancer - colorectal            Current Outpatient Prescriptions   Medication Sig     fluconazole (DIFLUCAN) 150 MG tablet Take 1 tablet (150 mg) by mouth every 3 days     desogestrel-ethinyl estradiol (APRI) 0.15-30 MG-MCG per tablet Take 1 tablet by mouth daily . Active tabs only, skip placebo take continuously     propranolol (INDERAL) 20 MG tablet TAKE 1 TABLET BY MOUTH TWICE DAILY. TAKE TABLET 30-60 MIN BEFORE SPEAKING EVENT     Cholecalciferol (VITAMIN D PO) Take 1,000 Units by mouth daily     Ascorbic Acid (VITAMIN C PO) Take by mouth daily     CRANBERRY PO Take by mouth daily     ZYRTEC 10 MG OR TABS 1 TABLET DAILY     [DISCONTINUED] APRI 0.15-30 MG-MCG per tablet TAKE 1 TABLET BY MOUTH EVERY DAY     EPINEPHrine (EPIPEN/ADRENACLICK/OR ANY BX GENERIC EQUIV) 0.3 MG/0.3ML injection 2-pack Inject 0.3 mLs (0.3 mg) into the muscle as needed for anaphylaxis (Patient not taking: Reported on 12/22/2017)     EPIPEN 2-LORI 0.3 MG/0.3ML injection 2-pack INJECT ONE PEN INTRAMUSCULARLY ONCE AS NEEDED FOR ANAPHYLAXIS (Patient not taking: Reported on 12/22/2017)      "EPINEPHrine (EPIPEN) 0.3 MG/0.3ML injection Inject 0.3 mLs (0.3 mg) into the muscle once as needed for anaphylaxis Adult dose (Patient not taking: Reported on 12/22/2017)     No current facility-administered medications for this visit.      Allergies   Allergen Reactions     Quinolones        ROS:  12 point review of systems negative other than symptoms noted below.  Genitourinary: Heavy Bleeding with Period, Irregular Menses, Painful [place] and Vaginal Itching    OBJECTIVE:     /66  Pulse 68  Ht 5' 1.5\" (1.562 m)  Wt 116 lb (52.6 kg)  LMP 12/17/2017 (Exact Date)  BMI 21.56 kg/m2  Body mass index is 21.56 kg/(m^2).    Exam:  Constitutional:  Appearance: Well nourished, well developed alert, in no acute distress  Neurologic/Psychiatric:  Mental Status:  Oriented X3   Pelvic Exam:  External Genitalia:     Normal appearance for age, no discharge present, no tenderness present, no inflammatory lesions present, color normal  Vagina:     Normal vaginal vault without central or paravaginal defects, THICK WHITE CURD LIKE discharge present, no inflammatory lesions present, no masses present  Bladder:     Nontender to palpation  Urethra:   Urethral Body:  Urethra palpation normal, urethra structural support normal   Urethral Meatus:  No erythema or lesions present  Cervix:     Appearance healthy, no lesions present, nontender to palpation, no bleeding present  Uterus:     Uterus: firm, normal sized and nontender, anteverted in position.   Adnexa:     No adnexal tenderness present, no adnexal masses present  Perineum:     Perineum within normal limits, no evidence of trauma, no rashes or skin lesions present  Anus:     Anus within normal limits, no hemorrhoids present  Inguinal Lymph Nodes:     No lymphadenopathy present  Pubic Hair:     Normal pubic hair distribution for age  Genitalia and Groin:     No rashes present, no lesions present, no areas of discoloration, no masses present       In-Clinic Test " Results:  Results for orders placed or performed in visit on 12/22/17 (from the past 24 hour(s))   Wet  Prep   Result Value Ref Range    Specimen Description Vagina     Wet Prep No Trichomonas seen     Wet Prep No clue cells seen     Wet Prep Yeast seen (A)        ASSESSMENT/PLAN:                                                        ICD-10-CM    1. Itching of vagina L29.8 Wet  Prep     Gram stain     Yeast culture     Group B strep PCR     desogestrel-ethinyl estradiol (APRI) 0.15-30 MG-MCG per tablet   2. Vaginal candida B37.3 fluconazole (DIFLUCAN) 150 MG tablet   3. Encounter for surveillance of contraceptive pills Z30.41 desogestrel-ethinyl estradiol (APRI) 0.15-30 MG-MCG per tablet       There are no Patient Instructions on file for this visit.    Wet prep + yeast. Will treat today. Encouraged warm bath soaks. Will also try to use OCP's continuously. Vaginal cultures pending.     No atrophy or LSEA noted.     GEORGES Patel CNP  [facility]

## 2017-12-22 NOTE — MR AVS SNAPSHOT
"              After Visit Summary   12/22/2017    Jessie Karimi    MRN: [id number]           Patient Information     Date Of Birth          1975        Visit Information        Provider Department      12/22/2017 10:00 AM Jerrica Rios APRN CNP [facility]        Today's Diagnoses     Itching of vagina    -  1    Vaginal candida        Encounter for surveillance of contraceptive pills           Follow-ups after your visit        Your next 10 appointments already scheduled     Dec 27, 2017 11:00 AM CST   (Arrive by 10:45 AM)   MA SCREENING DIGITAL BILATERAL with WEMA1   [facility] ([facility] [place])    [address], [address]  [place] [address] [phone]           Do not use any powder, lotion or deodorant under your arms or on your breast. If you do, we will ask you to remove it before your exam.  Wear comfortable, two-piece clothing.  If you have any allergies, tell your care team.  Bring any previous mammograms from other facilities or have them mailed to the breast center. Three-dimensional (3D) mammograms are available at [place] locations in [facility], [facility], [place], [place], and [place]. [facility] locations include [place] and [facility] [place] in [place]. Benefits of 3D mammograms include: - Improved rate of cancer detection - Decreases your chance of having to go back for more tests, which means fewer: - \"False-positive\" results (This means that there is an abnormal area but it isn't cancer.) - Invasive testing procedures, such as a biopsy or surgery - Can provide clearer images of the breast if you have dense breast tissue. 3D mammography is an optional exam that anyone can have with a 2D mammogram. It doesn't replace or take the place of a 2D mammogram. 2D mammograms remain an effective screening test for all women.  Not all insurance companies cover the cost of a " "3D mammogram. Check with your insurance.              Who to contact     If you have questions or need follow up information about today's clinic visit or your schedule please contact [facility] directly at [phone].  Normal or non-critical lab and imaging results will be communicated to you by Cyzonehart, letter or phone within 4 business days after the clinic has received the results. If you do not hear from us within 7 days, please contact the clinic through Cyzonehart or phone. If you have a critical or abnormal lab result, we will notify you by phone as soon as possible.  Submit refill requests through Zenitum or call your pharmacy and they will forward the refill request to us. Please allow 3 business days for your refill to be completed.          Additional Information About Your Visit        Cyzonehart Information     Zenitum gives you secure access to your electronic health record. If you see a primary care provider, you can also send messages to your care team and make appointments. If you have questions, please call your primary care clinic.  If you do not have a primary care provider, please call [phone] and they will assist you.        Care EveryWhere ID     This is your Care EveryWhere ID. This could be used by other organizations to access your [facility] medical records  [id number]        Your Vitals Were     Pulse Height Last Period BMI (Body Mass Index)          68 5' 1.5\" (1.562 m) 12/17/2017 (Exact Date) 21.56 kg/m2         Blood Pressure from Last 3 Encounters:   12/22/17 104/66   05/17/17 117/76   03/09/17 102/64    Weight from Last 3 Encounters:   12/22/17 116 lb (52.6 kg)   05/17/17 106 lb (48.1 kg)   03/09/17 108 lb 9.6 oz (49.3 kg)              We Performed the Following     Gram stain     Group B strep PCR     Wet  Prep     Yeast culture          Today's Medication Changes          These changes are accurate as of: 12/22/17 11:04 AM.  If you have any questions, ask " your nurse or doctor.               Start taking these medicines.        Dose/Directions    fluconazole 150 MG tablet   Commonly known as:  DIFLUCAN   Used for:  Vaginal candida   Started by:  Jerrica Rios APRN CNP        Dose:  150 mg   Take 1 tablet (150 mg) by mouth every 3 days   Quantity:  4 tablet   Refills:  0         These medicines have changed or have updated prescriptions.        Dose/Directions    desogestrel-ethinyl estradiol 0.15-30 MG-MCG per tablet   Commonly known as:  APRI   This may have changed:  See the new instructions.   Used for:  Encounter for surveillance of contraceptive pills, Itching of vagina   Changed by:  Jerrica Rios APRN CNP        Dose:  1 tablet   Take 1 tablet by mouth daily . Active tabs only, skip placebo take continuously   Quantity:  112 tablet   Refills:  4            Where to get your medicines      These medications were sent to [place] [address] [place] [id number] [place] [address]  [address] [address]     Phone:  [phone]     desogestrel-ethinyl estradiol 0.15-30 MG-MCG per tablet    fluconazole 150 MG tablet                Primary Care Provider Office Phone # Fax #    GEORGES Garza [phone] [address] [place] [address]        Equal Access to Services     DELFIN GUILLERMO AH: Gary berkowitzo Soofeali, waaxda luqadaha, qaybta kaalmada adeegyada, gerardo bernstein. So [facility] [phone].    ATENCIÓN: Si habla español, tiene a jha disposición servicios gratuitos de asistencia lingüística. Dennis al [phone].    We comply with applicable federal civil rights laws and [place] laws. We do not discriminate on the basis of race, color, national origin, age, disability, sex, sexual orientation, or gender identity.            Thank you!     Thank you for choosing [facility] [place]  for your care. Our goal is always to provide you with excellent  care. Hearing back from our patients is one way we can continue to improve our services. Please take a few minutes to complete the written survey that you may receive in the mail after your visit with us. Thank you!             Your Updated Medication List - Protect others around you: Learn how to safely use, store and throw away your medicines at www.disposemymeds.org.          This list is accurate as of: 12/22/17 11:04 AM.  Always use your most recent med list.                   Brand Name Dispense Instructions for use Diagnosis    CRANBERRY PO      Take by mouth daily        desogestrel-ethinyl estradiol 0.15-30 MG-MCG per tablet    APRI    112 tablet    Take 1 tablet by mouth daily . Active tabs only, skip placebo take continuously    Encounter for surveillance of contraceptive pills, Itching of vagina       * EPINEPHrine 0.3 MG/0.3ML injection    EPIPEN    2 each    Inject 0.3 mLs (0.3 mg) into the muscle once as needed for anaphylaxis Adult dose    Allergic       * EPIPEN 2-LORI 0.3 MG/0.3ML injection 2-pack   Generic drug:  EPINEPHrine     0.6 mL    INJECT ONE PEN INTRAMUSCULARLY ONCE AS NEEDED FOR ANAPHYLAXIS    Tree nut allergy       * EPINEPHrine 0.3 MG/0.3ML injection 2-pack    EPIPEN/ADRENACLICK/or ANY BX GENERIC EQUIV    0.6 mL    Inject 0.3 mLs (0.3 mg) into the muscle as needed for anaphylaxis    Tree nut allergy       fluconazole 150 MG tablet    DIFLUCAN    4 tablet    Take 1 tablet (150 mg) by mouth every 3 days    Vaginal ayala       propranolol 20 MG tablet    INDERAL    45 tablet    TAKE 1 TABLET BY MOUTH TWICE DAILY. TAKE TABLET 30-60 MIN BEFORE SPEAKING EVENT    Situational anxiety       VITAMIN C PO      Take by mouth daily        VITAMIN D PO      Take 1,000 Units by mouth daily        ZYRTEC 10 MG tablet   Generic drug:  cetirizine     30    1 TABLET DAILY        * Notice:  This list has 3 medication(s) that are the same as other medications prescribed for you. Read the directions  carefully, and ask your doctor or other care provider to review them with you.

## 2017-12-23 LAB
GP B STREP DNA SPEC QL NAA+PROBE: NEGATIVE
SPECIMEN SOURCE: NORMAL

## 2017-12-23 ASSESSMENT — ANXIETY QUESTIONNAIRES: GAD7 TOTAL SCORE: 0

## 2017-12-26 LAB
SPECIMEN SOURCE: ABNORMAL
YEAST SPEC QL CULT: ABNORMAL

## 2018-01-02 ENCOUNTER — MYC MEDICAL ADVICE (OUTPATIENT)
Dept: OBGYN | Facility: CLINIC | Age: 43
End: 2018-01-02

## 2018-01-05 ENCOUNTER — OFFICE VISIT (OUTPATIENT)
Dept: OBGYN | Facility: CLINIC | Age: 43
End: 2018-01-05
Payer: COMMERCIAL

## 2018-01-05 VITALS
SYSTOLIC BLOOD PRESSURE: 102 MMHG | HEIGHT: 62 IN | WEIGHT: 116 LBS | HEART RATE: 74 BPM | DIASTOLIC BLOOD PRESSURE: 66 MMHG | BODY MASS INDEX: 21.35 KG/M2

## 2018-01-05 DIAGNOSIS — N89.8 ITCHING OF VAGINA: Primary | ICD-10-CM

## 2018-01-05 DIAGNOSIS — Z30.41 ENCOUNTER FOR SURVEILLANCE OF CONTRACEPTIVE PILLS: ICD-10-CM

## 2018-01-05 DIAGNOSIS — B37.31 YEAST VAGINITIS: ICD-10-CM

## 2018-01-05 LAB
GRAM STN SPEC: NORMAL
GRAM STN SPEC: NORMAL
Lab: NORMAL
SPECIMEN SOURCE: NORMAL
SPECIMEN SOURCE: NORMAL
WET PREP SPEC: NORMAL

## 2018-01-05 PROCEDURE — 99213 OFFICE O/P EST LOW 20 MIN: CPT | Performed by: NURSE PRACTITIONER

## 2018-01-05 PROCEDURE — 87210 SMEAR WET MOUNT SALINE/INK: CPT | Performed by: NURSE PRACTITIONER

## 2018-01-05 PROCEDURE — 87653 STREP B DNA AMP PROBE: CPT | Performed by: NURSE PRACTITIONER

## 2018-01-05 PROCEDURE — 87205 SMEAR GRAM STAIN: CPT | Performed by: NURSE PRACTITIONER

## 2018-01-05 PROCEDURE — 87102 FUNGUS ISOLATION CULTURE: CPT | Performed by: NURSE PRACTITIONER

## 2018-01-05 RX ORDER — KETOCONAZOLE 200 MG/1
200 TABLET ORAL DAILY
Qty: 7 TABLET | Refills: 0 | Status: SHIPPED | OUTPATIENT
Start: 2018-01-05 | End: 2018-05-11

## 2018-01-05 RX ORDER — DROSPIRENONE AND ETHINYL ESTRADIOL 0.03MG-3MG
1 KIT ORAL DAILY
Qty: 84 TABLET | Refills: 1 | Status: SHIPPED | OUTPATIENT
Start: 2018-01-05 | End: 2018-03-08

## 2018-01-05 NOTE — MR AVS SNAPSHOT
"              After Visit Summary   1/5/2018    Jessie Karimi    MRN: [id number]           Patient Information     Date Of Birth          1975        Visit Information        Provider Department      1/5/2018 8:00 AM Jerrica Rios APRN CNP [facility]        Today's Diagnoses     Itching of vagina    -  1    Encounter for surveillance of contraceptive pills        Yeast vaginitis           Follow-ups after your visit        Who to contact     If you have questions or need follow up information about today's clinic visit or your schedule please contact [facility] directly at [phone].  Normal or non-critical lab and imaging results will be communicated to you by Galeneahart, letter or phone within 4 business days after the clinic has received the results. If you do not hear from us within 7 days, please contact the clinic through Galeneahart or phone. If you have a critical or abnormal lab result, we will notify you by phone as soon as possible.  Submit refill requests through Best Bid or call your pharmacy and they will forward the refill request to us. Please allow 3 business days for your refill to be completed.          Additional Information About Your Visit        MyChart Information     Best Bid gives you secure access to your electronic health record. If you see a primary care provider, you can also send messages to your care team and make appointments. If you have questions, please call your primary care clinic.  If you do not have a primary care provider, please call [phone] and they will assist you.        Care EveryWhere ID     This is your Care EveryWhere ID. This could be used by other organizations to access your [place] medical records  [id number]        Your Vitals Were     Pulse Height Last Period BMI (Body Mass Index)          74 5' 1.5\" (1.562 m) 12/17/2017 (Exact Date) 21.56 kg/m2         Blood Pressure from Last 3 Encounters:   01/05/18 " 102/66   12/22/17 104/66   05/17/17 117/76    Weight from Last 3 Encounters:   01/05/18 116 lb (52.6 kg)   12/22/17 116 lb (52.6 kg)   05/17/17 106 lb (48.1 kg)              We Performed the Following     Gram stain     Group B strep PCR     Wet prep     Yeast culture          Today's Medication Changes          These changes are accurate as of: 1/5/18  8:34 AM.  If you have any questions, ask your nurse or doctor.               Start taking these medicines.        Dose/Directions    drospirenone-ethinyl estradiol 3-0.03 MG per tablet   Commonly known as:  MAYCOL   Used for:  Encounter for surveillance of contraceptive pills   Started by:  Jerrica Rios APRN CNP        Dose:  1 tablet   Take 1 tablet by mouth daily   Quantity:  84 tablet   Refills:  1       ketoconazole 200 MG tablet   Commonly known as:  NIZORAL   Used for:  Yeast vaginitis   Started by:  Jerrica Rios APRN CNP        Dose:  200 mg   Take 1 tablet (200 mg) by mouth daily   Quantity:  7 tablet   Refills:  0         Stop taking these medicines if you haven't already. Please contact your care team if you have questions.     desogestrel-ethinyl estradiol 0.15-30 MG-MCG per tablet   Commonly known as:  APRI   Stopped by:  Jerrica Rios APRN CNP                Where to get your medicines      These medications were sent to [facility] [address] [place] [facility] [address] [place] [address]  [address] [address]     Phone:  [phone]     drospirenone-ethinyl estradiol 3-0.03 MG per tablet    ketoconazole 200 MG tablet                Primary Care Provider Office Phone # Fax #    GEORGES Garza [phone] [address] [place] [address]        Equal Access to Services     [facility]IMA AH: Gary Trevino, waalejandro luqadaha, qaybta kaalmada adeflaca, gerardo bernstein. [facility] [phone].    ATENCIÓN: Si lady denise jha disposición  servicios gratuitos de asistencia lingüística. Dennis bell [phone].    We comply with applicable federal civil rights laws and [place] laws. We do not discriminate on the basis of race, color, national origin, age, disability, sex, sexual orientation, or gender identity.            Thank you!     Thank you for choosing [facility]  for your care. Our goal is always to provide you with excellent care. Hearing back from our patients is one way we can continue to improve our services. Please take a few minutes to complete the written survey that you may receive in the mail after your visit with us. Thank you!             Your Updated Medication List - Protect others around you: Learn how to safely use, store and throw away your medicines at www.disposemymeds.org.          This list is accurate as of: 1/5/18  8:34 AM.  Always use your most recent med list.                   Brand Name Dispense Instructions for use Diagnosis    CRANBERRY PO      Take by mouth daily        drospirenone-ethinyl estradiol 3-0.03 MG per tablet    MAYCOL    84 tablet    Take 1 tablet by mouth daily    Encounter for surveillance of contraceptive pills       * EPINEPHrine 0.3 MG/0.3ML injection    EPIPEN    2 each    Inject 0.3 mLs (0.3 mg) into the muscle once as needed for anaphylaxis Adult dose    Allergic       * EPIPEN 2-LORI 0.3 MG/0.3ML injection 2-pack   Generic drug:  EPINEPHrine     0.6 mL    INJECT ONE PEN INTRAMUSCULARLY ONCE AS NEEDED FOR ANAPHYLAXIS    Tree nut allergy       * EPINEPHrine 0.3 MG/0.3ML injection 2-pack    EPIPEN/ADRENACLICK/or ANY BX GENERIC EQUIV    0.6 mL    Inject 0.3 mLs (0.3 mg) into the muscle as needed for anaphylaxis    Tree nut allergy       ketoconazole 200 MG tablet    NIZORAL    7 tablet    Take 1 tablet (200 mg) by mouth daily    Yeast vaginitis       propranolol 20 MG tablet    INDERAL    45 tablet    TAKE 1 TABLET BY MOUTH TWICE DAILY. TAKE TABLET 30-60 MIN BEFORE SPEAKING  EVENT    Situational anxiety       VITAMIN C PO      Take by mouth daily        VITAMIN D PO      Take 1,000 Units by mouth daily        ZYRTEC 10 MG tablet   Generic drug:  cetirizine     30    1 TABLET DAILY        * Notice:  This list has 3 medication(s) that are the same as other medications prescribed for you. Read the directions carefully, and ask your doctor or other care provider to review them with you.

## 2018-01-05 NOTE — PROGRESS NOTES
SUBJECTIVE:                                                   Jessie Karimi is a 42 year old female who presents to clinic today for the following health issue(s):  Patient presents with:  RECHECK: still having yeast-like sx      HPI:  Pt here today with c/o mild vaginal itching. She has a long history of yeast infections. She has not tried anything over the counter this time.     She just finished treatement for yeast and had relief of symptoms, but now they are back. She had positive cultures last time as well.    She is wanting to try changing her OCP to see if that makes a difference. She has not tolerated the patches in the past due to nausea and I don't feel a nuva ring would be appropriate either.     Patient's last menstrual period was 2017 (exact date)..   Patient is sexually active, .  Using oral contraceptives for contraception.    reports that she has never smoked. She has never used smokeless tobacco.      STD testing offered?  Declined    Health maintenance updated:  yes    Today's PHQ-2 Score:   PHQ-2 (  [employer]) 3/9/2017   Q1: Little interest or pleasure in doing things 0   Q2: Feeling down, depressed or hopeless 0   PHQ-2 Score 0   Q1: Little interest or pleasure in doing things -   Q2: Feeling down, depressed or hopeless -   PHQ-2 Score -     Today's PHQ-9 Score:   PHQ-9 SCORE 2017   Total Score 1     Today's NICOLASA-7 Score:   NICOLASA-7 SCORE 2017   Total Score 0       Problem list and histories reviewed & adjusted, as indicated.  Additional history: as documented.    Patient Active Problem List   Diagnosis     Other acne     Encounter for other general counseling or advice on contraception     Allergic     CARDIOVASCULAR SCREENING; LDL GOAL LESS THAN 160     Environmental allergies     Health Care Home     Tree nut allergy     Encounter for surveillance of contraceptive pills     ASCUS with positive high risk HPV cervical     DUB (dysfunctional uterine bleeding)      Past Surgical History:   Procedure Laterality Date     BIOPSY  April 2013    moles removed by [facility] and biopsied; benign     C NONSPECIFIC PROCEDURE      wisdom teeth removed      Social History   Substance Use Topics     Smoking status: Never Smoker     Smokeless tobacco: Never Used     Alcohol use Yes      Comment: occasionaly      Problem (# of Occurrences) Relation (Name,Age of Onset)    Breast Cancer (1) Mother (April): Diagnosed Aug 2012; double mast + chem; remiss.    C.A.D. (1) Maternal Grandmother    CEREBROVASCULAR DISEASE (1) Maternal Grandmother    DIABETES (3) Father (Minesh): Type II, Mother (April): Type II, Maternal Uncle    Heart Failure (1) Mother (April)       Negative family history of: Hypertension, Prostate Cancer, Cancer - colorectal            Current Outpatient Prescriptions   Medication Sig     drospirenone-ethinyl estradiol (MAYCOL) 3-0.03 MG per tablet Take 1 tablet by mouth daily     ketoconazole (NIZORAL) 200 MG tablet Take 1 tablet (200 mg) by mouth daily     propranolol (INDERAL) 20 MG tablet TAKE 1 TABLET BY MOUTH TWICE DAILY. TAKE TABLET 30-60 MIN BEFORE SPEAKING EVENT     Cholecalciferol (VITAMIN D PO) Take 1,000 Units by mouth daily     Ascorbic Acid (VITAMIN C PO) Take by mouth daily     CRANBERRY PO Take by mouth daily     ZYRTEC 10 MG OR TABS 1 TABLET DAILY     EPINEPHrine (EPIPEN/ADRENACLICK/OR ANY BX GENERIC EQUIV) 0.3 MG/0.3ML injection 2-pack Inject 0.3 mLs (0.3 mg) into the muscle as needed for anaphylaxis (Patient not taking: Reported on 12/22/2017)     EPIPEN 2-LORI 0.3 MG/0.3ML injection 2-pack INJECT ONE PEN INTRAMUSCULARLY ONCE AS NEEDED FOR ANAPHYLAXIS (Patient not taking: Reported on 12/22/2017)     EPINEPHrine (EPIPEN) 0.3 MG/0.3ML injection Inject 0.3 mLs (0.3 mg) into the muscle once as needed for anaphylaxis Adult dose (Patient not taking: Reported on 12/22/2017)     No current facility-administered medications for this visit.      Allergies   Allergen Reactions  "    Quinolones        ROS:  12 point review of systems negative other than symptoms noted below.  Genitourinary: Vaginal Discharge and Vaginal Itching    OBJECTIVE:     /66  Pulse 74  Ht 5' 1.5\" (1.562 m)  Wt 116 lb (52.6 kg)  LMP 12/17/2017 (Exact Date)  BMI 21.56 kg/m2  Body mass index is 21.56 kg/(m^2).    Exam:  Constitutional:  Appearance: Well nourished, well developed alert, in no acute distress  Neurologic/Psychiatric:  Mental Status:  Oriented X3   Pelvic Exam:  External Genitalia:     Normal appearance for age, no discharge present, no tenderness present, no inflammatory lesions present, color normal  Vagina:     Normal vaginal vault without central or paravaginal defects, SCANT THICK CURD LIKE WHITE  discharge present, no inflammatory lesions present, no masses present  Bladder:     Nontender to palpation  Urethra:   Urethral Body:  Urethra palpation normal, urethra structural support normal   Urethral Meatus:  No erythema or lesions present  Cervix:     Appearance healthy, no lesions present, nontender to palpation, no bleeding present  Uterus:     Uterus: firm, normal sized and nontender, anteverted in position.   Adnexa:     No adnexal tenderness present, no adnexal masses present  Perineum:     Perineum within normal limits, no evidence of trauma, no rashes or skin lesions present  Anus:     Anus within normal limits, no hemorrhoids present  Inguinal Lymph Nodes:     No lymphadenopathy present  Pubic Hair:     Normal pubic hair distribution for age  Genitalia and Groin:     No rashes present, no lesions present, no areas of discoloration, no masses present       In-Clinic Test Results:  Results for orders placed or performed in visit on 01/05/18 (from the past 24 hour(s))   Wet prep   Result Value Ref Range    Specimen Description Vagina     Wet Prep No Trichomonas seen     Wet Prep No clue cells seen     Wet Prep No yeast seen        ASSESSMENT/PLAN:                                         "                ICD-10-CM    1. Itching of vagina L29.8 Wet prep     Gram stain     Yeast culture     Group B strep PCR   2. Encounter for surveillance of contraceptive pills Z30.41 drospirenone-ethinyl estradiol (MAYCOL) 3-0.03 MG per tablet   3. Yeast vaginitis B37.3 ketoconazole (NIZORAL) 200 MG tablet       There are no Patient Instructions on file for this visit.    Will change to maycol OCP's. I sent a prescription for ketoconazole tabs x1 week, however I have asked her not to take them until I get her cultures back as her wet prep was negative.. RTC if symptoms persist. Will update on cultures.     GEORGES Patel CNP  [facility] [place]

## 2018-01-06 LAB
GP B STREP DNA SPEC QL NAA+PROBE: NEGATIVE
SPECIMEN SOURCE: NORMAL

## 2018-01-09 LAB
Lab: NORMAL
SPECIMEN SOURCE: NORMAL
YEAST SPEC QL CULT: NORMAL

## 2018-03-08 ENCOUNTER — MYC MEDICAL ADVICE (OUTPATIENT)
Dept: OBGYN | Facility: CLINIC | Age: 43
End: 2018-03-08

## 2018-03-08 DIAGNOSIS — Z30.41 ENCOUNTER FOR SURVEILLANCE OF CONTRACEPTIVE PILLS: ICD-10-CM

## 2018-03-09 RX ORDER — DROSPIRENONE AND ETHINYL ESTRADIOL 0.03MG-3MG
1 KIT ORAL DAILY
Qty: 112 TABLET | Refills: 1 | Status: SHIPPED | OUTPATIENT
Start: 2018-03-09 | End: 2018-08-26

## 2018-05-11 ENCOUNTER — OFFICE VISIT (OUTPATIENT)
Dept: FAMILY MEDICINE | Facility: CLINIC | Age: 43
End: 2018-05-11
Payer: COMMERCIAL

## 2018-05-11 VITALS
DIASTOLIC BLOOD PRESSURE: 66 MMHG | TEMPERATURE: 99.3 F | SYSTOLIC BLOOD PRESSURE: 104 MMHG | HEIGHT: 61 IN | HEART RATE: 72 BPM | RESPIRATION RATE: 14 BRPM | WEIGHT: 118.8 LBS | BODY MASS INDEX: 22.43 KG/M2

## 2018-05-11 DIAGNOSIS — Z13.6 CARDIOVASCULAR SCREENING; LDL GOAL LESS THAN 160: ICD-10-CM

## 2018-05-11 DIAGNOSIS — Z12.4 SCREENING FOR CERVICAL CANCER: ICD-10-CM

## 2018-05-11 DIAGNOSIS — Z11.51 SCREENING FOR HUMAN PAPILLOMAVIRUS: ICD-10-CM

## 2018-05-11 DIAGNOSIS — Z00.00 ROUTINE GENERAL MEDICAL EXAMINATION AT A HEALTH CARE FACILITY: Primary | ICD-10-CM

## 2018-05-11 DIAGNOSIS — Z91.018 TREE NUT ALLERGY: ICD-10-CM

## 2018-05-11 DIAGNOSIS — Z23 ENCOUNTER FOR IMMUNIZATION: ICD-10-CM

## 2018-05-11 PROCEDURE — 90471 IMMUNIZATION ADMIN: CPT | Performed by: NURSE PRACTITIONER

## 2018-05-11 PROCEDURE — G0123 SCREEN CERV/VAG THIN LAYER: HCPCS | Performed by: NURSE PRACTITIONER

## 2018-05-11 PROCEDURE — G0124 SCREEN C/V THIN LAYER BY MD: HCPCS | Performed by: NURSE PRACTITIONER

## 2018-05-11 PROCEDURE — 90715 TDAP VACCINE 7 YRS/> IM: CPT | Performed by: NURSE PRACTITIONER

## 2018-05-11 PROCEDURE — 36415 COLL VENOUS BLD VENIPUNCTURE: CPT | Performed by: NURSE PRACTITIONER

## 2018-05-11 PROCEDURE — 99396 PREV VISIT EST AGE 40-64: CPT | Mod: 25 | Performed by: NURSE PRACTITIONER

## 2018-05-11 PROCEDURE — 87624 HPV HI-RISK TYP POOLED RSLT: CPT | Performed by: NURSE PRACTITIONER

## 2018-05-11 PROCEDURE — 80048 BASIC METABOLIC PNL TOTAL CA: CPT | Performed by: NURSE PRACTITIONER

## 2018-05-11 RX ORDER — EPINEPHRINE 0.3 MG/.3ML
0.3 INJECTION SUBCUTANEOUS PRN
Qty: 0.6 ML | Refills: 1 | Status: SHIPPED | OUTPATIENT
Start: 2018-05-11 | End: 2019-07-22

## 2018-05-11 ASSESSMENT — PAIN SCALES - GENERAL: PAINLEVEL: NO PAIN (0)

## 2018-05-11 NOTE — PROGRESS NOTES
SUBJECTIVE:   CC: Jessie Karimi is an 42 year old woman who presents for preventive health visit.     Healthy Habits:    Do you get at least three servings of calcium containing foods daily (dairy, green leafy vegetables, etc.)? yes    Amount of exercise or daily activities, outside of work: 3 day(s) per week    Problems taking medications regularly No    Medication side effects: No    Have you had an eye exam in the past two years? yes    Do you see a dentist twice per year? yes    Do you have sleep apnea, excessive snoring or daytime drowsiness?no    *Did get a reminder that she is due for a colposcopy, but is only wanting to do a colposcopy if anything is abnormal with today's pap smear.     *Is not fasting.     Today's PHQ-2 Score:   PHQ-2 ( 1999 Pf[employer]) 5/11/2018 3/9/2017   Q1: Little interest or pleasure in doing things 0 0   Q2: Feeling down, depressed or hopeless 0 0   PHQ-2 Score 0 0   Q1: Little interest or pleasure in doing things - -   Q2: Feeling down, depressed or hopeless - -   PHQ-2 Score - -     Abuse: Current or Past(Physical, Sexual or Emotional)- No  Do you feel safe in your environment - Yes    Social History   Substance Use Topics     Smoking status: Never Smoker     Smokeless tobacco: Never Used     Alcohol use Yes      Comment: occasionaly     If you drink alcohol do you typically have >3 drinks per day or >7 drinks per week? No                     Reviewed orders with patient.  Reviewed health maintenance and updated orders accordingly - Yes  Labs reviewed in EPIC    Patient under age 50, mutual decision reflected in health maintenance.      Pertinent mammograms are reviewed under the imaging tab.  History of abnormal Pap smear: YES - updated in Problem List and Health Maintenance accordingly    Reviewed and updated as needed this visit by clinical staff  Tobacco  Allergies  Meds  Med Hx  Surg Hx  Fam Hx  Soc Hx        Reviewed and updated as needed this visit by Provider       "      ROS:  CONSTITUTIONAL:POSITIVE  for weight gain  INTEGUMENTARU/SKIN: NEGATIVE for worrisome rashes, moles or lesions  EYES: NEGATIVE for vision changes or irritation and POSITIVE for corrected vision for reading   ENT: NEGATIVE for ear, mouth and throat problems and current with dentist   RESP: NEGATIVE for significant cough or SOB  BREAST: NEGATIVE for masses, tenderness or discharge  CV: NEGATIVE for chest pain, palpitations or peripheral edema  GI: NEGATIVE for nausea, abdominal pain, heartburn, or change in bowel habits  : NEGATIVE for unusual urinary or vaginal symptoms. Periods are regular.  MUSCULOSKELETAL: NEGATIVE for significant arthralgias or myalgia  NEURO: NEGATIVE for weakness, dizziness or paresthesias  ENDOCRINE: NEGATIVE for temperature intolerance, skin/hair changes  HEME/ALLERGY/IMMUNE: NEGATIVE for bleeding problems  PSYCHIATRIC: NEGATIVE for changes in mood or affect    OBJECTIVE:   /66 (BP Location: Left arm, Patient Position: Chair, Cuff Size: Adult Regular)  Pulse 72  Temp 99.3  F (37.4  C) (Tympanic)  Resp 14  Ht 5' 1.46\" (1.561 m)  Wt 118 lb 12.8 oz (53.9 kg)  LMP 03/15/2018 (Within Days)  BMI 22.11 kg/m2  EXAM:  GENERAL: healthy, alert and no distress  EYES: Eyes grossly normal to inspection, PERRL and conjunctivae and sclerae normal  HENT: ear canals and TM's normal, nose and mouth without ulcers or lesions  NECK: no adenopathy, no asymmetry, masses, or scars and thyroid normal to palpation  RESP: lungs clear to auscultation - no rales, rhonchi or wheezes  BREAST: normal without masses, tenderness or nipple discharge and no palpable axillary masses or adenopathy  CV: regular rate and rhythm, normal S1 S2, no S3 or S4, no murmur, click or rub, no peripheral edema and peripheral pulses strong  ABDOMEN: soft, nontender, no hepatosplenomegaly, no masses and bowel sounds normal   (female): normal female external genitalia, normal urethral meatus, vaginal mucosa pink, " moist, well rugated, and normal cervix/adnexa/uterus without masses or discharge  MS: no gross musculoskeletal defects noted, no edema  SKIN: no suspicious lesions or rashes  NEURO: Normal strength and tone, mentation intact and speech normal  PSYCH: mentation appears normal, affect normal/bright  LYMPH: no cervical, supraclavicular, axillary, or inguinal adenopathy    ASSESSMENT/PLAN:     ASSESSMENT/PLAN:      ICD-10-CM    1. Routine general medical examination at a health care facility Z00.00 Pap imaged thin layer screen with HPV - recommended age 30 - 65     HPV High Risk Types DNA Cervical     Basic metabolic panel  (Ca, Cl, CO2, Creat, Gluc, K, Na, BUN)   2. Screening for cervical cancer Z12.4 Pap imaged thin layer screen with HPV - recommended age 30 - 65     HPV High Risk Types DNA Cervical   3. Screening for human papillomavirus Z11.51    4. Tree nut allergy Z91.018 EPINEPHrine (EPIPEN/ADRENACLICK/OR ANY BX GENERIC EQUIV) 0.3 MG/0.3ML injection 2-pack   5. CARDIOVASCULAR SCREENING; LDL GOAL LESS THAN 160 Z13.6 HPV High Risk Types DNA Cervical   6. Encounter for immunization Z23 TDAP VACCINE (ADACEL)       Patient Instructions     Preventive Health Recommendations  Female Ages 40 to 49    Yearly exam:     See your health care provider every year in order to  1. Review health changes.   2. Discuss preventive care.    3. Review your medicines if your doctor prescribed any.      Get a Pap test every three years (unless you have an abnormal result and your provider advises testing more often).      If you get Pap tests with HPV test, you only need to test every 5 years, unless you have an abnormal result. You do not need a Pap test if your uterus was removed (hysterectomy) and you have not had cancer.      You should be tested each year for STDs (sexually transmitted diseases), if you're at risk.       Ask your doctor if you should have a mammogram.      Have a colonoscopy (test for colon cancer) if someone in  "your family has had colon cancer or polyps before age 50.       Have a cholesterol test every 5 years.       Have a diabetes test (fasting glucose) after age 45. If you are at risk for diabetes, you should have this test every 3 years.    Shots: Get a flu shot each year. Get a tetanus shot every 10 years.     Nutrition:     Eat at least 5 servings of fruits and vegetables each day.    Eat whole-grain bread, whole-wheat pasta and brown rice instead of white grains and rice.    Talk to your provider about Calcium and Vitamin D.     Lifestyle    Exercise at least 150 minutes a week (an average of 30 minutes a day, 5 days a week). This will help you control your weight and prevent disease.    Limit alcohol to one drink per day.    No smoking.     Wear sunscreen to prevent skin cancer.    See your dentist every six months for an exam and cleaning.      You are doing great.  !!!     No change with medication     Enjoy life               COUNSELING:   Reviewed preventive health counseling, as reflected in patient instructions       Regular exercise       Healthy diet/nutrition       Vision screening         reports that she has never smoked. She has never used smokeless tobacco.    Estimated body mass index is 22.11 kg/(m^2) as calculated from the following:    Height as of this encounter: 5' 1.46\" (1.561 m).    Weight as of this encounter: 118 lb 12.8 oz (53.9 kg).       Counseling Resources:  ATP IV Guidelines  Pooled Cohorts Equation Calculator  Breast Cancer Risk Calculator  FRAX Risk Assessment  ICSI Preventive Guidelines  Dietary Guidelines for Americans, 2010  USDA's MyPlate  ASA Prophylaxis  Lung CA Screening    CINDY MARTINEZ NP, APRN CNP  [facility]  "

## 2018-05-11 NOTE — PATIENT INSTRUCTIONS
Preventive Health Recommendations  Female Ages 40 to 49    Yearly exam:     See your health care provider every year in order to  1. Review health changes.   2. Discuss preventive care.    3. Review your medicines if your doctor prescribed any.      Get a Pap test every three years (unless you have an abnormal result and your provider advises testing more often).      If you get Pap tests with HPV test, you only need to test every 5 years, unless you have an abnormal result. You do not need a Pap test if your uterus was removed (hysterectomy) and you have not had cancer.      You should be tested each year for STDs (sexually transmitted diseases), if you're at risk.       Ask your doctor if you should have a mammogram.      Have a colonoscopy (test for colon cancer) if someone in your family has had colon cancer or polyps before age 50.       Have a cholesterol test every 5 years.       Have a diabetes test (fasting glucose) after age 45. If you are at risk for diabetes, you should have this test every 3 years.    Shots: Get a flu shot each year. Get a tetanus shot every 10 years.     Nutrition:     Eat at least 5 servings of fruits and vegetables each day.    Eat whole-grain bread, whole-wheat pasta and brown rice instead of white grains and rice.    Talk to your provider about Calcium and Vitamin D.     Lifestyle    Exercise at least 150 minutes a week (an average of 30 minutes a day, 5 days a week). This will help you control your weight and prevent disease.    Limit alcohol to one drink per day.    No smoking.     Wear sunscreen to prevent skin cancer.    See your dentist every six months for an exam and cleaning.      You are doing great.  !!!     No change with medication     Enjoy life

## 2018-05-11 NOTE — MR AVS SNAPSHOT
After Visit Summary   5/11/2018    Jessie Karimi    MRN: [id number]           Patient Information     Date Of Birth          1975        Visit Information        Provider Department      5/11/2018 1:00 PM Naz Ríos APRN [facility]        Today's Diagnoses     Routine general medical examination at a health care facility    -  1    Screening for cervical cancer        Screening for human papillomavirus        Tree nut allergy        CARDIOVASCULAR SCREENING; LDL GOAL LESS THAN 160        Encounter for immunization          Care Instructions      Preventive Health Recommendations  Female Ages 40 to 49    Yearly exam:     See your health care provider every year in order to  1. Review health changes.   2. Discuss preventive care.    3. Review your medicines if your doctor prescribed any.      Get a Pap test every three years (unless you have an abnormal result and your provider advises testing more often).      If you get Pap tests with HPV test, you only need to test every 5 years, unless you have an abnormal result. You do not need a Pap test if your uterus was removed (hysterectomy) and you have not had cancer.      You should be tested each year for STDs (sexually transmitted diseases), if you're at risk.       Ask your doctor if you should have a mammogram.      Have a colonoscopy (test for colon cancer) if someone in your family has had colon cancer or polyps before age 50.       Have a cholesterol test every 5 years.       Have a diabetes test (fasting glucose) after age 45. If you are at risk for diabetes, you should have this test every 3 years.    Shots: Get a flu shot each year. Get a tetanus shot every 10 years.     Nutrition:     Eat at least 5 servings of fruits and vegetables each day.    Eat whole-grain bread, whole-wheat pasta and brown rice instead of white grains and rice.    Talk to your provider about Calcium and Vitamin D.  "    Lifestyle    Exercise at least 150 minutes a week (an average of 30 minutes a day, 5 days a week). This will help you control your weight and prevent disease.    Limit alcohol to one drink per day.    No smoking.     Wear sunscreen to prevent skin cancer.    See your dentist every six months for an exam and cleaning.      You are doing great.  !!!     No change with medication     Enjoy life             Follow-ups after your visit        Who to contact     Normal or non-critical lab and imaging results will be communicated to you by [employer], letter or phone within 4 business days after the clinic has received the results. If you do not hear from us within 7 days, please contact the clinic through [employer] or phone. If you have a critical or abnormal lab result, we will notify you by phone as soon as possible.  Submit refill requests through [employer] or call your pharmacy and they will forward the refill request to us. Please allow 3 business days for your refill to be completed.          If you need to speak with a  for additional information , please call: [phone]           Additional Information About Your Visit        [employer] Information     [employer] gives you secure access to your electronic health record. If you see a primary care provider, you can also send messages to your care team and make appointments. If you have questions, please call your primary care clinic.  If you do not have a primary care provider, please call [phone] and they will assist you.        Care EveryWhere ID     This is your Care EveryWhere ID. This could be used by other organizations to access your [place] medical records  [id number]        Your Vitals Were     Pulse Temperature Respirations Height Last Period BMI (Body Mass Index)    72 99.3  F (37.4  C) (Tympanic) 14 5' 1.46\" (1.561 m) 03/15/2018 (Within Days) 22.11 kg/m2       Blood Pressure from Last 3 Encounters:   05/11/18 104/66   01/05/18 102/66 "   12/22/17 104/66    Weight from Last 3 Encounters:   05/11/18 118 lb 12.8 oz (53.9 kg)   01/05/18 116 lb (52.6 kg)   12/22/17 116 lb (52.6 kg)              We Performed the Following     Basic metabolic panel  (Ca, Cl, CO2, Creat, Gluc, K, Na, BUN)     HPV High Risk Types DNA Cervical     Pap imaged thin layer screen with HPV - recommended age 30 - 65     TDAP VACCINE (ADACEL)          Today's Medication Changes          These changes are accurate as of 5/11/18  1:58 PM.  If you have any questions, ask your nurse or doctor.               These medicines have changed or have updated prescriptions.        Dose/Directions    EPINEPHrine 0.3 MG/0.3ML injection 2-pack   Commonly known as:  EPIPEN/ADRENACLICK/or ANY BX GENERIC EQUIV   This may have changed:  Another medication with the same name was removed. Continue taking this medication, and follow the directions you see here.   Used for:  Tree nut allergy   Changed by:  Naz Ríos APRN CNP        Dose:  0.3 mg   Inject 0.3 mLs (0.3 mg) into the muscle as needed for anaphylaxis   Quantity:  0.6 mL   Refills:  1         Stop taking these medicines if you haven't already. Please contact your care team if you have questions.     ketoconazole 200 MG tablet   Commonly known as:  NIZORAL   Stopped by:  Naz Ríos APRN CNP                Where to get your medicines      These medications were sent to [place] [address] [place] [facility] [address] [address]  [address] [address]     Phone:  [phone]     EPINEPHrine 0.3 MG/0.3ML injection 2-pack                Primary Care Provider Office Phone # Fax #    GEORGES Garza [phone] [address] [place] [address]        Equal Access to Services     DELFIN GUILLERMO : Gary ruffin Soyoel, waaxda luqadaha, qaybta kaalmada adeegyada, gerardo bernstein. [facility] [phone].    ATENCIÓN: Amando lópez  español, tiene a jha disposición servicios gratuitos de asistencia lingüística. Dennis bell [phone].    We comply with applicable federal civil rights laws and [place] laws. We do not discriminate on the basis of race, color, national origin, age, disability, sex, sexual orientation, or gender identity.            Thank you!     Thank you for choosing [facility]  for your care. Our goal is always to provide you with excellent care. Hearing back from our patients is one way we can continue to improve our services. Please take a few minutes to complete the written survey that you may receive in the mail after your visit with us. Thank you!             Your Updated Medication List - Protect others around you: Learn how to safely use, store and throw away your medicines at www.disposemymeds.org.          This list is accurate as of 5/11/18  1:58 PM.  Always use your most recent med list.                   Brand Name Dispense Instructions for use Diagnosis    CRANBERRY PO      Take by mouth daily        drospirenone-ethinyl estradiol 3-0.03 MG per tablet    MAYCOL    112 tablet    Take 1 tablet by mouth daily , active tabs only, skip placebo pills. Take continuous    Encounter for surveillance of contraceptive pills       EPINEPHrine 0.3 MG/0.3ML injection 2-pack    EPIPEN/ADRENACLICK/or ANY BX GENERIC EQUIV    0.6 mL    Inject 0.3 mLs (0.3 mg) into the muscle as needed for anaphylaxis    Tree nut allergy       VITAMIN C PO      Take by mouth daily        VITAMIN D PO      Take 1,000 Units by mouth daily        ZYRTEC 10 MG tablet   Generic drug:  cetirizine     30    1 TABLET DAILY

## 2018-05-12 LAB
ANION GAP SERPL CALCULATED.3IONS-SCNC: 7 MMOL/L (ref 3–14)
BUN SERPL-MCNC: 18 MG/DL (ref 7–30)
CALCIUM SERPL-MCNC: 8.5 MG/DL (ref 8.5–10.1)
CHLORIDE SERPL-SCNC: 107 MMOL/L (ref 94–109)
CO2 SERPL-SCNC: 25 MMOL/L (ref 20–32)
CREAT SERPL-MCNC: 0.6 MG/DL (ref 0.52–1.04)
GFR SERPL CREATININE-BSD FRML MDRD: >90 ML/MIN/1.7M2
GLUCOSE SERPL-MCNC: 95 MG/DL (ref 70–99)
POTASSIUM SERPL-SCNC: 3.6 MMOL/L (ref 3.4–5.3)
SODIUM SERPL-SCNC: 139 MMOL/L (ref 133–144)

## 2018-05-17 LAB
COPATH REPORT: ABNORMAL
PAP: ABNORMAL

## 2018-05-21 PROBLEM — R87.810 CERVICAL HIGH RISK HPV (HUMAN PAPILLOMAVIRUS) TEST POSITIVE: Status: ACTIVE | Noted: 2018-05-11

## 2018-05-21 LAB
FINAL DIAGNOSIS: ABNORMAL
HPV HR 12 DNA CVX QL NAA+PROBE: POSITIVE
HPV16 DNA SPEC QL NAA+PROBE: NEGATIVE
HPV18 DNA SPEC QL NAA+PROBE: NEGATIVE
SPECIMEN DESCRIPTION: ABNORMAL
SPECIMEN SOURCE CVX/VAG CYTO: ABNORMAL

## 2018-06-15 ENCOUNTER — OFFICE VISIT (OUTPATIENT)
Dept: OBGYN | Facility: CLINIC | Age: 43
End: 2018-06-15
Payer: COMMERCIAL

## 2018-06-15 VITALS
SYSTOLIC BLOOD PRESSURE: 109 MMHG | WEIGHT: 115 LBS | TEMPERATURE: 98.1 F | HEART RATE: 93 BPM | BODY MASS INDEX: 21.41 KG/M2 | DIASTOLIC BLOOD PRESSURE: 77 MMHG

## 2018-06-15 DIAGNOSIS — R87.612 PAPANICOLAOU SMEAR OF CERVIX WITH LOW GRADE SQUAMOUS INTRAEPITHELIAL LESION (LGSIL): Primary | ICD-10-CM

## 2018-06-15 LAB — BETA HCG QUAL IFA URINE: NEGATIVE

## 2018-06-15 PROCEDURE — 57454 BX/CURETT OF CERVIX W/SCOPE: CPT | Performed by: OBSTETRICS & GYNECOLOGY

## 2018-06-15 PROCEDURE — 84703 CHORIONIC GONADOTROPIN ASSAY: CPT | Performed by: OBSTETRICS & GYNECOLOGY

## 2018-06-15 PROCEDURE — 88305 TISSUE EXAM BY PATHOLOGIST: CPT | Performed by: OBSTETRICS & GYNECOLOGY

## 2018-06-15 NOTE — PROGRESS NOTES
"GYN CLINIC VISIT  6/15/2018  CC: colposcopy    HPI:  Jessie Karimi is a 42 year old female  who presents for repeat colposcopy, referred by CINDY MARTINEZ. Pap smear on 2018 showed: with high risk HPV present: + other HPV and LSIL. The prior pap showed LSIL.     3/4/16: Pap - ASCUS, +HR HPV. Plan colp  3/22/16 [place]: ECC normal. Plan: cotest in 12 mo. Tracking.   3/9/17:Pap--LSIL, neg HPV. Plan colp  17: [place] Bx normal. Plan cotest in 1 year per provider.  18 LSIL Pap, + HR HPV (Neg ). Plan colp due by 18.       No LMP recorded. Patient is not currently having periods (Reason: Birth Control).  UPT today is negative  Patient does not smoke  Type of contraception: oral contraceptive  Age at first sexual intercourse: 20  Number of sexual partners (lifetime): 27  Past GYN history: No STD history and HPV  Prior cervical/vaginal disease: Normal exam without visible pathology.  Prior cervical treatment: no treatment.    Vitals:    06/15/18 1413   BP: 109/77   Pulse: 93   Temp: 98.1  F (36.7  C)   TempSrc: Oral   Weight: 115 lb (52.2 kg)     Estimated body mass index is 21.41 kg/(m^2) as calculated from the following:    Height as of 18: 5' 1.46\" (1.561 m).    Weight as of this encounter: 115 lb (52.2 kg).     PROCEDURE:      Before the procedure, it was ensured that the patient was educated regarding the nature of her findings to date, the implications, and what was to be done. She has been made aware of the role of HPV, the natural history of infection, ways to minimize her future risk, the effect of HPV on the cervix, and treatment options available should they be indicated. The details of the colposcopic procedure were reviewed. All questions were answered before proceeding, and informed consent was therefore obtained.      Speculum placed in vagina and excellent visualization of cervix acheived, cervix swabbed x 3 with acetic acid solution.      FINDINGS:  Cervix: " acetowhitening noted at 6 o'clock  SCJ seen?: yes but not well seen circumferentially. Cervical os very small  ECC done?: Yes   Satisfactory examination?: no    Representative biopsy taken at 6 o'clock. ECC also performed. Specimens labeled and sent to pathology. monsels applied to achieve hemostasis. Patient tolerated procedure well.      ASSESSMENT: HPV related changes.  PLAN: specimens labelled and sent to Pathology, will base further treatment on Pathology findings, treatment options discussed with patient, post biopsy instructions given to patient and call to discuss Pathology results      Lala Lara MD

## 2018-06-15 NOTE — MR AVS SNAPSHOT
After Visit Summary   6/15/2018    Jessie Karimi    MRN: [id number]           Patient Information     Date Of Birth          1975        Visit Information        Provider Department      6/15/2018 2:00 PM Lala Lara MD; [facility]        Today's Diagnoses     Papanicolaou smear of cervix with low grade squamous intraepithelial lesion (LGSIL)    -  1       Follow-ups after your visit        Who to contact     If you have questions or need follow up information about today's clinic visit or your schedule please contact [facility] directly at [phone].  Normal or non-critical lab and imaging results will be communicated to you by MyChart, letter or phone within 4 business days after the clinic has received the results. If you do not hear from us within 7 days, please contact the clinic through [employer] or phone. If you have a critical or abnormal lab result, we will notify you by phone as soon as possible.  Submit refill requests through [employer] or call your pharmacy and they will forward the refill request to us. Please allow 3 business days for your refill to be completed.          Additional Information About Your Visit        MyChart Information     [employer] gives you secure access to your electronic health record. If you see a primary care provider, you can also send messages to your care team and make appointments. If you have questions, please call your primary care clinic.  If you do not have a primary care provider, please call [phone] and they will assist you.        Care EveryWhere ID     This is your Care EveryWhere ID. This could be used by other organizations to access your [place] medical records  [id number]        Your Vitals Were     Pulse Temperature BMI (Body Mass Index)             93 98.1  F (36.7  C) (Oral) 21.41 kg/m2          Blood Pressure from Last 3 Encounters:   06/15/18 109/77   05/11/18 104/66    01/05/18 102/66    Weight from Last 3 Encounters:   06/15/18 115 lb (52.2 kg)   05/11/18 118 lb 12.8 oz (53.9 kg)   01/05/18 116 lb (52.6 kg)              We Performed the Following     Beta HCG qual IFA urine     COLP CERVIX/UPPER VAGINA W BX CERVIX/ENDOCERV CURETT     Surgical pathology exam        Primary Care Provider Office Phone # Fax #    Naz Ríos, APRN [facility] [phone] [address] [place] [address]        Equal Access to Services     [facility]: Hadii aad ku hadasho Soomaali, waaxda luqadaha, qaybta kaalmada adeegyada, gerardo patel hayamie melendez . So [facility] [phone].    ATENCIÓN: Si habla español, tiene a jha disposición servicios gratuitos de asistencia lingüística. [facility] [phone].    We comply with applicable federal civil rights laws and [place] laws. We do not discriminate on the basis of race, color, national origin, age, disability, sex, sexual orientation, or gender identity.            Thank you!     Thank you for choosing [facility]  for your care. Our goal is always to provide you with excellent care. Hearing back from our patients is one way we can continue to improve our services. Please take a few minutes to complete the written survey that you may receive in the mail after your visit with us. Thank you!             Your Updated Medication List - Protect others around you: Learn how to safely use, store and throw away your medicines at www.disposemymeds.org.          This list is accurate as of 6/15/18  3:40 PM.  Always use your most recent med list.                   Brand Name Dispense Instructions for use Diagnosis    CRANBERRY PO      Take by mouth daily        drospirenone-ethinyl estradiol 3-0.03 MG per tablet    MAYCOL    112 tablet    Take 1 tablet by mouth daily , active tabs only, skip placebo pills. Take continuous    Encounter for surveillance of contraceptive pills       EPINEPHrine 0.3 MG/0.3ML  injection 2-pack    EPIPEN/ADRENACLICK/or ANY BX GENERIC EQUIV    0.6 mL    Inject 0.3 mLs (0.3 mg) into the muscle as needed for anaphylaxis    Tree nut allergy       VITAMIN C PO      Take by mouth daily        VITAMIN D PO      Take 1,000 Units by mouth daily        ZYRTEC 10 MG tablet   Generic drug:  cetirizine     30    1 TABLET DAILY

## 2018-06-15 NOTE — NURSING NOTE
"Chief Complaint   Patient presents with     Colposcopy       Initial /77  Pulse 93  Temp 98.1  F (36.7  C) (Oral)  Wt 115 lb (52.2 kg)  BMI 21.41 kg/m2 Estimated body mass index is 21.41 kg/(m^2) as calculated from the following:    Height as of 18: 5' 1.46\" (1.561 m).    Weight as of this encounter: 115 lb (52.2 kg).  BP completed using cuff size: regular        The following HM Due: NONE      The following patient reported/Care Every where data was sent to:  P ABSTRACT QUALITY INITIATIVES [[id number]]  na      n/a              "

## 2018-06-19 ENCOUNTER — VIRTUAL VISIT (OUTPATIENT)
Dept: FAMILY MEDICINE | Facility: CLINIC | Age: 43
End: 2018-06-19
Payer: COMMERCIAL

## 2018-06-19 ENCOUNTER — MYC MEDICAL ADVICE (OUTPATIENT)
Dept: FAMILY MEDICINE | Facility: CLINIC | Age: 43
End: 2018-06-19

## 2018-06-19 DIAGNOSIS — B37.31 YEAST INFECTION OF THE VAGINA: Primary | ICD-10-CM

## 2018-06-19 LAB — COPATH REPORT: NORMAL

## 2018-06-19 PROCEDURE — 98966 PH1 ASSMT&MGMT NQHP 5-10: CPT | Performed by: NURSE PRACTITIONER

## 2018-06-19 RX ORDER — FLUCONAZOLE 150 MG/1
150 TABLET ORAL
Qty: 4 TABLET | Refills: 1 | Status: SHIPPED | OUTPATIENT
Start: 2018-06-19 | End: 2019-01-05

## 2018-06-19 ASSESSMENT — PAIN SCALES - GENERAL: PAINLEVEL: MILD PAIN (2)

## 2018-06-19 NOTE — PROGRESS NOTES
"Jessie Karimi is a 42 year old female who is being evaluated via a telephone visit.      The patient has been notified of following:     \"This telephone visit will be conducted via a call between you and your physician/provider. We have found that certain health care needs can be provided without the need for a physical exam.  This service lets us provide the care you need with a short phone conversation.  If a prescription is necessary we can send it directly to your pharmacy.  If lab work is needed we can place an order for that and you can then stop by our lab to have the test done at a later time.    We will bill your insurance company for this service.  Please check with your medical insurance if this type of visit is covered. You may be responsible for the cost of this type of visit if insurance coverage is denied.  The typical cost is $30 (10min), $59 (11-20min) and $85 (21-30min).  Most often these visits are shorter than 10 minutes.    If during the course of the call the physician/provider feels a telephone visit is not appropriate, you will not be charged for this service.\"       Consent has been obtained for this service by care team member: yes.   See the scanned image in the medical record.    Jessie Karimi complains of  Vaginal Problem      I have reviewed and updated the patient's Past Medical History, Social History, Family History and Medication List.    ALLERGIES  Quinolones and Tree nuts [nuts]    Karina Tanner CMA (MA signature)    Additional provider notes:     She is thinking she is having a yeast infection and not getting better with any OTC treatments.    She is having discharge, and itching/burning.  Has been going on for three days.    Feels the change in OCP's has helped decrease her yeast infections.  Both infections happened after a vaginal exam   No stress, life is otherwise good     She says now nothing seems to work except fluconazole.     has been getting recurrent yeast " infections.    Feels that her hormone levels could be contribute to the  Recurrent yeast infections.     Assessment/Plan:  ASSESSMENT/PLAN:      ICD-10-CM    1. Yeast infection of the vagina B37.3 fluconazole (DIFLUCAN) 150 MG tablet       Patient Instructions   Will have her take diflucan today and repeat every 3 days x4.     Try drinking  1 bottle of apple cider vinegar with lemon daily     Diet is good.           .    I have reviewed the note as documented above.  This accurately captures the substance of my conversation with the patient,      Total time of call between patient and provider was less than 10  minutes

## 2018-06-19 NOTE — PATIENT INSTRUCTIONS
Will have her take diflucan today and repeat every 3 days x4.     Try drinking  1 bottle of apple cider vinegar with lemon daily     Diet is good.

## 2018-06-19 NOTE — MR AVS SNAPSHOT
After Visit Summary   6/19/2018    Jessie Karimi    MRN: [id number]           Patient Information     Date Of Birth          1975        Visit Information        Provider Department      6/19/2018 4:00 PM Naz Ríos APRN [facility]        Today's Diagnoses     Yeast infection of the vagina    -  1      Care Instructions    Will have her take diflucan today and repeat every 3 days x4.     Try drinking  1 bottle of apple cider vinegar with lemon daily     Diet is good.             Follow-ups after your visit        Who to contact     Normal or non-critical lab and imaging results will be communicated to you by Jenn Rykerthart, letter or phone within 4 business days after the clinic has received the results. If you do not hear from us within 7 days, please contact the clinic through Jenn Rykerthart or phone. If you have a critical or abnormal lab result, we will notify you by phone as soon as possible.  Submit refill requests through TripIt or call your pharmacy and they will forward the refill request to us. Please allow 3 business days for your refill to be completed.          If you need to speak with a  for additional information , please call: [phone]           Additional Information About Your Visit        MyChart Information     TripIt gives you secure access to your electronic health record. If you see a primary care provider, you can also send messages to your care team and make appointments. If you have questions, please call your primary care clinic.  If you do not have a primary care provider, please call [phone] and they will assist you.        Care EveryWhere ID     This is your Care EveryWhere ID. This could be used by other organizations to access your [facility] medical records  [id number]         Blood Pressure from Last 3 Encounters:   06/15/18 109/77   05/11/18 104/66   01/05/18 102/66    Weight from Last 3 Encounters:    06/15/18 115 lb (52.2 kg)   05/11/18 118 lb 12.8 oz (53.9 kg)   01/05/18 116 lb (52.6 kg)              Today, you had the following     No orders found for display         Today's Medication Changes          These changes are accurate as of 6/19/18  4:36 PM.  If you have any questions, ask your nurse or doctor.               Start taking these medicines.        Dose/Directions    fluconazole 150 MG tablet   Commonly known as:  DIFLUCAN   Used for:  Yeast infection of the vagina   Started by:  Naz Ríos APRN CNP        Dose:  150 mg   Take 1 tablet (150 mg) by mouth every 3 days   Quantity:  4 tablet   Refills:  1            Where to get your medicines      These medications were sent to [place] [address] [place] [address]  [address] [address]     Phone:  [phone]     fluconazole 150 MG tablet                Primary Care Provider Office Phone # Fax #    GEORGES Garza [phone] [address] [place] [address]        Equal Access to Services     ANGY GUILLERMO AH: Hadii jimmy ku hadasho Soomaali, waaxda luqadaha, qaybta kaalmada adeegyada, gerardo melendez . So [facility] [phone].    ATENCIÓN: Si habla español, tiene a jha disposición servicios gratuitos de asistencia lingüística. Llame al [phone].    We comply with applicable federal civil rights laws and [place] laws. We do not discriminate on the basis of race, color, national origin, age, disability, sex, sexual orientation, or gender identity.            Thank you!     Thank you for choosing [facility]  for your care. Our goal is always to provide you with excellent care. Hearing back from our patients is one way we can continue to improve our services. Please take a few minutes to complete the written survey that you may receive in the mail after your visit with us. Thank you!             Your Updated  Medication List - Protect others around you: Learn how to safely use, store and throw away your medicines at www.disposemymeds.org.          This list is accurate as of 6/19/18  4:36 PM.  Always use your most recent med list.                   Brand Name Dispense Instructions for use Diagnosis    CRANBERRY PO      Take by mouth daily        drospirenone-ethinyl estradiol 3-0.03 MG per tablet    MAYCOL    112 tablet    Take 1 tablet by mouth daily , active tabs only, skip placebo pills. Take continuous    Encounter for surveillance of contraceptive pills       EPINEPHrine 0.3 MG/0.3ML injection 2-pack    EPIPEN/ADRENACLICK/or ANY BX GENERIC EQUIV    0.6 mL    Inject 0.3 mLs (0.3 mg) into the muscle as needed for anaphylaxis    Tree nut allergy       fluconazole 150 MG tablet    DIFLUCAN    4 tablet    Take 1 tablet (150 mg) by mouth every 3 days    Yeast infection of the vagina       VITAMIN C PO      Take by mouth daily        VITAMIN D PO      Take 1,000 Units by mouth daily        ZYRTEC 10 MG tablet   Generic drug:  cetirizine     30    1 TABLET DAILY

## 2018-08-26 DIAGNOSIS — Z30.41 ENCOUNTER FOR SURVEILLANCE OF CONTRACEPTIVE PILLS: ICD-10-CM

## 2018-08-27 NOTE — TELEPHONE ENCOUNTER
"Requested Prescriptions   Pending Prescriptions Disp Refills     drospirenone-ethinyl estradiol (MAYCOL) 3-0.03 MG per tablet [Pharmacy Med Name: DROSPIRENONE-EE 3-0.03 MG TAB] 112 tablet 1     Sig: TAKE 1 TABLET BY MOUTH DAILY. ACTIVE TABLETS ONLY, SKIP PLACEBO PILLS. TAKE CONTINUOUSLY    Contraceptives Protocol Passed    8/26/2018  3:45 AM       Passed - Patient is not a current smoker if age is 35 or older       Passed - Recent (12 mo) or future (30 days) visit within the authorizing provider's specialty    Patient had office visit in the last 12 months or has a visit in the next 30 days with authorizing provider or within the authorizing provider's specialty.  See \"Patient Info\" tab in inbasket, or \"Choose Columns\" in Meds & Orders section of the refill encounter.           Passed - No active pregnancy on record       Passed - No positive pregnancy test in past 12 months        Last Written Prescription Date:  3/9/2018  Last Fill Quantity: 112,  # refills: 1   Last office visit: 6/15/2018 with prescribing provider:  Jerrica Rios NP   Future Office Visit:  NONE    "

## 2018-08-28 RX ORDER — DROSPIRENONE AND ETHINYL ESTRADIOL 0.03MG-3MG
KIT ORAL
Qty: 112 TABLET | Refills: 2 | Status: SHIPPED | OUTPATIENT
Start: 2018-08-28 | End: 2019-05-12

## 2018-12-31 ENCOUNTER — HOSPITAL ENCOUNTER (OUTPATIENT)
Dept: MAMMOGRAPHY | Facility: CLINIC | Age: 43
Discharge: HOME | End: 2018-12-31
Attending: NURSE PRACTITIONER | Admitting: NURSE PRACTITIONER
Payer: COMMERCIAL

## 2018-12-31 DIAGNOSIS — Z12.31 VISIT FOR SCREENING MAMMOGRAM: ICD-10-CM

## 2018-12-31 PROCEDURE — 77063 BREAST TOMOSYNTHESIS BI: CPT

## 2019-01-04 ENCOUNTER — TELEPHONE (OUTPATIENT)
Dept: FAMILY MEDICINE | Facility: CLINIC | Age: 44
End: 2019-01-04

## 2019-01-04 DIAGNOSIS — B37.31 YEAST INFECTION OF THE VAGINA: ICD-10-CM

## 2019-01-04 NOTE — TELEPHONE ENCOUNTER
Looks like you have given her Diflucan. Do you want her to do a virtual visit or just prescribe.? Crystal Barclay RN

## 2019-01-04 NOTE — TELEPHONE ENCOUNTER
Pt calling as she has recurring yeast infections and feels she has one again. She states that the cream does not work and that Naz Ríos typically prescribes a pill with 4 doses. She is wondering if she can get this medication again, however, she couldn't remember the name of it.     Please review,  Thank you.  Gabriella Whiting, DIANA.

## 2019-01-05 ENCOUNTER — NURSE TRIAGE (OUTPATIENT)
Dept: NURSING | Facility: CLINIC | Age: 44
End: 2019-01-05

## 2019-01-05 RX ORDER — FLUCONAZOLE 150 MG/1
150 TABLET ORAL
Qty: 4 TABLET | Refills: 1 | Status: SHIPPED | OUTPATIENT
Start: 2019-01-05 | End: 2019-07-22

## 2019-01-05 NOTE — TELEPHONE ENCOUNTER
Paged as on call physician regarding this request.  Patient does not want to wait until Monday and doesn't feel she should have to go to urgent care since she called about this yesterday.  Diflucan refill sent.    Bob Horn MD

## 2019-01-05 NOTE — TELEPHONE ENCOUNTER
Patient states she never heard back from clinic on 1/4/19 regarding Diflucan request.  FNA paged on call provider, Dr. POLLO Horn, via page  at 12:13PM to contact [facility] at [phone].  Dr. Horn returned call and said she would send prescription to [facility] in [place].  FNA contacted patient to inform of above.

## 2019-02-02 ENCOUNTER — MYC MEDICAL ADVICE (OUTPATIENT)
Dept: FAMILY MEDICINE | Facility: CLINIC | Age: 44
End: 2019-02-02

## 2019-05-12 DIAGNOSIS — Z30.41 ENCOUNTER FOR SURVEILLANCE OF CONTRACEPTIVE PILLS: ICD-10-CM

## 2019-05-13 RX ORDER — DROSPIRENONE AND ETHINYL ESTRADIOL 0.03MG-3MG
KIT ORAL
Qty: 112 TABLET | Refills: 0 | Status: SHIPPED | OUTPATIENT
Start: 2019-05-13 | End: 2019-07-22

## 2019-05-13 NOTE — TELEPHONE ENCOUNTER
"Requested Prescriptions   Pending Prescriptions Disp Refills     drospirenone-ethinyl estradiol (MAYCOL) 3-0.03 MG tablet [Pharmacy Med Name: DROSPIRENONE-EE 3-0.03 MG TAB] 112 tablet 2     Sig: TAKE 1 TABLET BY MOUTH DAILY. ACTIVE TABLETS ONLY, SKIP PLACEBO PILLS. TAKE CONTINUOUSLY       Contraceptives Protocol Failed - 5/12/2019  8:29 AM        Failed - Recent (12 mo) or future (30 days) visit within the authorizing provider's specialty     Patient had office visit in the last 12 months or has a visit in the next 30 days with authorizing provider or within the authorizing provider's specialty.  See \"Patient Info\" tab in inbasket, or \"Choose Columns\" in Meds & Orders section of the refill encounter.              Passed - Patient is not a current smoker if age is 35 or older        Passed - Medication is active on med list        Passed - No active pregnancy on record        Passed - No positive pregnancy test in past 12 months        Medication is being filled for 1 time refill only due to:  pt is due for an annual exam    Brittani Dupree RN on 5/13/2019 at 7:56 AM      "

## 2019-06-06 ENCOUNTER — TELEPHONE (OUTPATIENT)
Dept: OBGYN | Facility: CLINIC | Age: 44
End: 2019-06-06

## 2019-06-06 NOTE — TELEPHONE ENCOUNTER
Pt is past due for Pap follow up  Reminder letter has been sent  LMTC her clinic with any questions or to schedule    Leslie Fuentes,   Pap Tracking

## 2019-07-22 ENCOUNTER — OFFICE VISIT (OUTPATIENT)
Dept: FAMILY MEDICINE | Facility: CLINIC | Age: 44
End: 2019-07-22
Payer: COMMERCIAL

## 2019-07-22 VITALS
DIASTOLIC BLOOD PRESSURE: 62 MMHG | SYSTOLIC BLOOD PRESSURE: 102 MMHG | RESPIRATION RATE: 12 BRPM | HEIGHT: 62 IN | TEMPERATURE: 98.1 F | BODY MASS INDEX: 18.66 KG/M2 | HEART RATE: 56 BPM | WEIGHT: 101.4 LBS

## 2019-07-22 DIAGNOSIS — Z00.00 ROUTINE GENERAL MEDICAL EXAMINATION AT A HEALTH CARE FACILITY: ICD-10-CM

## 2019-07-22 DIAGNOSIS — Z12.4 SCREENING FOR CERVICAL CANCER: ICD-10-CM

## 2019-07-22 DIAGNOSIS — Z30.41 ENCOUNTER FOR SURVEILLANCE OF CONTRACEPTIVE PILLS: ICD-10-CM

## 2019-07-22 DIAGNOSIS — Z91.018 TREE NUT ALLERGY: ICD-10-CM

## 2019-07-22 DIAGNOSIS — Z13.6 CARDIOVASCULAR SCREENING; LDL GOAL LESS THAN 160: Primary | ICD-10-CM

## 2019-07-22 DIAGNOSIS — Z11.51 SCREENING FOR HUMAN PAPILLOMAVIRUS: ICD-10-CM

## 2019-07-22 LAB
ANION GAP SERPL CALCULATED.3IONS-SCNC: 7 MMOL/L (ref 3–14)
BUN SERPL-MCNC: 14 MG/DL (ref 7–30)
CALCIUM SERPL-MCNC: 9.1 MG/DL (ref 8.5–10.1)
CHLORIDE SERPL-SCNC: 101 MMOL/L (ref 94–109)
CHOLEST SERPL-MCNC: 178 MG/DL
CO2 SERPL-SCNC: 29 MMOL/L (ref 20–32)
CREAT SERPL-MCNC: 0.67 MG/DL (ref 0.52–1.04)
GFR SERPL CREATININE-BSD FRML MDRD: >90 ML/MIN/{1.73_M2}
GLUCOSE SERPL-MCNC: 80 MG/DL (ref 70–99)
HDLC SERPL-MCNC: 80 MG/DL
LDLC SERPL CALC-MCNC: 73 MG/DL
NONHDLC SERPL-MCNC: 98 MG/DL
POTASSIUM SERPL-SCNC: 3.2 MMOL/L (ref 3.4–5.3)
SODIUM SERPL-SCNC: 137 MMOL/L (ref 133–144)
TRIGL SERPL-MCNC: 125 MG/DL

## 2019-07-22 PROCEDURE — 87624 HPV HI-RISK TYP POOLED RSLT: CPT | Performed by: NURSE PRACTITIONER

## 2019-07-22 PROCEDURE — 80061 LIPID PANEL: CPT | Performed by: NURSE PRACTITIONER

## 2019-07-22 PROCEDURE — 99396 PREV VISIT EST AGE 40-64: CPT | Performed by: NURSE PRACTITIONER

## 2019-07-22 PROCEDURE — G0124 SCREEN C/V THIN LAYER BY MD: HCPCS | Performed by: NURSE PRACTITIONER

## 2019-07-22 PROCEDURE — 80048 BASIC METABOLIC PNL TOTAL CA: CPT | Performed by: NURSE PRACTITIONER

## 2019-07-22 PROCEDURE — G0145 SCR C/V CYTO,THINLAYER,RESCR: HCPCS | Performed by: NURSE PRACTITIONER

## 2019-07-22 PROCEDURE — 36415 COLL VENOUS BLD VENIPUNCTURE: CPT | Performed by: NURSE PRACTITIONER

## 2019-07-22 RX ORDER — EPINEPHRINE 0.3 MG/.3ML
0.3 INJECTION SUBCUTANEOUS PRN
Qty: 0.6 ML | Refills: 1 | Status: SHIPPED | OUTPATIENT
Start: 2019-07-22 | End: 2022-01-18

## 2019-07-22 RX ORDER — DROSPIRENONE AND ETHINYL ESTRADIOL 0.03MG-3MG
KIT ORAL
Qty: 112 TABLET | Refills: 3 | Status: SHIPPED | OUTPATIENT
Start: 2019-07-22 | End: 2020-03-11

## 2019-07-22 ASSESSMENT — MIFFLIN-ST. JEOR: SCORE: 1065.26

## 2019-07-22 ASSESSMENT — PAIN SCALES - GENERAL: PAINLEVEL: NO PAIN (0)

## 2019-07-22 NOTE — PROGRESS NOTES
SUBJECTIVE:   CC: Jessie Karimi is an 43 year old woman who presents for preventive health visit.     Healthy Habits:    Do you get at least three servings of calcium containing foods daily (dairy, green leafy vegetables, etc.)? yes    Amount of exercise or daily activities, outside of work: 3-5 day(s) per week    Problems taking medications regularly No    Medication side effects: No    Have you had an eye exam in the past two years? yes    Do you see a dentist twice per year? yes    Do you have sleep apnea, excessive snoring or daytime drowsiness?no    *Did just finish prednisone (7 days) for a virus that she had, is still experiencing bilateral ear pain and a sore throat and having some PND. Does have allergies but would like to have ears checked today. Denies any fevers since completing therapy and states that her energy levels have gone back to normal as well.     *IS fasting. Does have biometric screening.     Today's PHQ-2 Score:   PHQ-2 ( 1999 Pf[employer]) 7/22/2019 5/11/2018   Q1: Little interest or pleasure in doing things 0 0   Q2: Feeling down, depressed or hopeless 0 0   PHQ-2 Score 0 0   Q1: Little interest or pleasure in doing things - -   Q2: Feeling down, depressed or hopeless - -   PHQ-2 Score - -     Abuse: Current or Past(Physical, Sexual or Emotional)- No  Do you feel safe in your environment? Yes    Social History     Tobacco Use     Smoking status: Never Smoker     Smokeless tobacco: Never Used   Substance Use Topics     Alcohol use: Yes     Comment: occasionaly     If you drink alcohol do you typically have >3 drinks per day or >7 drinks per week? No                     Reviewed orders with patient.  Reviewed health maintenance and updated orders accordingly - Yes  Labs reviewed in EPIC    Mammogram Screening: Patient under age 50, mutual decision reflected in health maintenance.      Pertinent mammograms are reviewed under the imaging tab.  History of abnormal Pap smear: YES - updated in  "Problem List and Health Maintenance accordingly  PAP / HPV Latest Ref Rng & Units 5/11/2018 3/9/2017 3/4/2016   PAP - LSIL(A) LSIL(A) ASC-US(A)   HPV 16 DNA NEG:Negative Negative Negative Negative   HPV 18 DNA NEG:Negative Negative Negative Negative   OTHER HR HPV NEG:Negative Positive(A) Negative Positive(A)     Reviewed and updated as needed this visit by clinical staff  Tobacco  Allergies  Meds  Med Hx  Surg Hx  Fam Hx  Soc Hx        Reviewed and updated as needed this visit by Provider  Tobacco  Allergies  Meds  Med Hx  Surg Hx  Fam Hx  Soc Hx           ROS:  CONSTITUTIONAL: NEGATIVE for fever, chills, POSITIVE for change in weight due to diet changes.   INTEGUMENTARU/SKIN: NEGATIVE for worrisome rashes, moles or lesions  EYES: NEGATIVE for vision changes or irritation, current with eye exam   ENT: POSITIVE for nasal congestion, rhinorrhea-clear, sinus pressure and   Is current with dentist   RESP: NEGATIVE for significant cough or SOB  BREAST: NEGATIVE for masses, tenderness or discharge  CV: NEGATIVE for chest pain, palpitations or peripheral edema  GI: NEGATIVE for nausea, abdominal pain, heartburn, or change in bowel habits, did have an anal fissure that is now cleared   : NEGATIVE for unusual urinary or vaginal symptoms. Periods are regular.  MUSCULOSKELETAL: NEGATIVE for significant arthralgias or myalgia  NEURO: NEGATIVE for weakness, dizziness or paresthesias  ENDOCRINE: NEGATIVE for temperature intolerance, skin/hair changes  HEME/ALLERGY/IMMUNE: NEGATIVE for bleeding problems  PSYCHIATRIC: NEGATIVE for changes in mood or affect    OBJECTIVE:   /62 (BP Location: Left arm, Patient Position: Chair, Cuff Size: Adult Regular)   Pulse 56   Temp 98.1  F (36.7  C) (Tympanic)   Resp 12   Ht 1.562 m (5' 1.5\")   Wt 46 kg (101 lb 6.4 oz)   LMP 06/30/2019 (Exact Date)   Breastfeeding? No   BMI 18.85 kg/m    EXAM:  GENERAL: healthy, alert and no distress  EYES: Eyes grossly normal to " inspection, PERRL and conjunctivae and sclerae normal  HENT: ear canals and TM's normal, nose and mouth without ulcers or lesions  NECK: no adenopathy, no asymmetry, masses, or scars and thyroid normal to palpation  RESP: lungs clear to auscultation - no rales, rhonchi or wheezes  BREAST: normal without masses, tenderness or nipple discharge and no palpable axillary masses or adenopathy  CV: regular rate and rhythm, normal S1 S2, no S3 or S4, no murmur, click or rub, no peripheral edema and peripheral pulses strong  ABDOMEN: soft, nontender, no hepatosplenomegaly, no masses and bowel sounds normal   (female): normal female external genitalia, normal urethral meatus, vaginal mucosa pink, moist, well rugated, and normal cervix/adnexa/uterus without masses or discharge  MS: no gross musculoskeletal defects noted, no edema  SKIN: no suspicious lesions or rashes  NEURO: Normal strength and tone, mentation intact and speech normal  PSYCH: mentation appears normal, affect normal/bright  LYMPH: no cervical, supraclavicular, axillary, or inguinal adenopathy    Diagnostic Test Results:  Labs reviewed in Epic  Pending     ASSESSMENT/PLAN:     ASSESSMENT/PLAN:      ICD-10-CM    1. CARDIOVASCULAR SCREENING; LDL GOAL LESS THAN 160 Z13.6 Lipid panel reflex to direct LDL Fasting     Basic metabolic panel  (Ca, Cl, CO2, Creat, Gluc, K, Na, BUN)   2. Routine general medical examination at a health care facility Z00.00    3. Screening for cervical cancer Z12.4 Pap imaged thin layer screen with HPV - recommended age 30 - 65   4. Screening for human papillomavirus Z11.51 HPV High Risk Types DNA Cervical   5. Tree nut allergy Z91.018 EPINEPHrine (EPIPEN/ADRENACLICK/OR ANY BX GENERIC EQUIV) 0.3 MG/0.3ML injection 2-pack   6. Encounter for surveillance of contraceptive pills Z30.41 drospirenone-ethinyl estradiol (MAYCOL) 3-0.03 MG tablet       Patient Instructions     Preventive Health Recommendations  Female Ages 40 to 49    Yearly  exam:     See your health care provider every year in order to  1. Review health changes.   2. Discuss preventive care.    3. Review your medicines if your doctor prescribed any.      Get a Pap test every three years (unless you have an abnormal result and your provider advises testing more often).      If you get Pap tests with HPV test, you only need to test every 5 years, unless you have an abnormal result. You do not need a Pap test if your uterus was removed (hysterectomy) and you have not had cancer.      You should be tested each year for STDs (sexually transmitted diseases), if you're at risk.     Ask your doctor if you should have a mammogram.      Have a colonoscopy (test for colon cancer) if someone in your family has had colon cancer or polyps before age 50.       Have a cholesterol test every 5 years.       Have a diabetes test (fasting glucose) after age 45. If you are at risk for diabetes, you should have this test every 3 years.    Shots: Get a flu shot each year. Get a tetanus shot every 10 years.     Nutrition:     Eat at least 5 servings of fruits and vegetables each day.    Eat whole-grain bread, whole-wheat pasta and brown rice instead of white grains and rice.    Get adequate Calcium and Vitamin D.      Lifestyle    Exercise at least 150 minutes a week (an average of 30 minutes a day, 5 days a week). This will help you control your weight and prevent disease.    Limit alcohol to one drink per day.    No smoking.     Wear sunscreen to prevent skin cancer.    See your dentist every six months for an exam and cleaning.    Continue with the same medication     Keep exercising                  COUNSELING:   Reviewed preventive health counseling, as reflected in patient instructions       Regular exercise       Healthy diet/nutrition       Vision screening       Immunizations    Current           Estimated body mass index is 18.85 kg/m  as calculated from the following:    Height as of this  "encounter: 1.562 m (5' 1.5\").    Weight as of this encounter: 46 kg (101 lb 6.4 oz).         reports that she has never smoked. She has never used smokeless tobacco.      Counseling Resources:  ATP IV Guidelines  Pooled Cohorts Equation Calculator  Breast Cancer Risk Calculator  FRAX Risk Assessment  ICSI Preventive Guidelines  Dietary Guidelines for Americans, 2010  USDA's MyPlate  ASA Prophylaxis  Lung CA Screening    CINDY MARTINEZ NP, APRN CNP  [facility]  "

## 2019-07-22 NOTE — PATIENT INSTRUCTIONS
Preventive Health Recommendations  Female Ages 40 to 49    Yearly exam:     See your health care provider every year in order to  1. Review health changes.   2. Discuss preventive care.    3. Review your medicines if your doctor prescribed any.      Get a Pap test every three years (unless you have an abnormal result and your provider advises testing more often).      If you get Pap tests with HPV test, you only need to test every 5 years, unless you have an abnormal result. You do not need a Pap test if your uterus was removed (hysterectomy) and you have not had cancer.      You should be tested each year for STDs (sexually transmitted diseases), if you're at risk.     Ask your doctor if you should have a mammogram.      Have a colonoscopy (test for colon cancer) if someone in your family has had colon cancer or polyps before age 50.       Have a cholesterol test every 5 years.       Have a diabetes test (fasting glucose) after age 45. If you are at risk for diabetes, you should have this test every 3 years.    Shots: Get a flu shot each year. Get a tetanus shot every 10 years.     Nutrition:     Eat at least 5 servings of fruits and vegetables each day.    Eat whole-grain bread, whole-wheat pasta and brown rice instead of white grains and rice.    Get adequate Calcium and Vitamin D.      Lifestyle    Exercise at least 150 minutes a week (an average of 30 minutes a day, 5 days a week). This will help you control your weight and prevent disease.    Limit alcohol to one drink per day.    No smoking.     Wear sunscreen to prevent skin cancer.    See your dentist every six months for an exam and cleaning.    Continue with the same medication     Keep exercising

## 2019-07-25 ENCOUNTER — TELEPHONE (OUTPATIENT)
Dept: FAMILY MEDICINE | Facility: CLINIC | Age: 44
End: 2019-07-25

## 2019-07-25 ENCOUNTER — MYC MEDICAL ADVICE (OUTPATIENT)
Dept: FAMILY MEDICINE | Facility: CLINIC | Age: 44
End: 2019-07-25

## 2019-07-25 DIAGNOSIS — L03.213 PERIORBITAL CELLULITIS, UNSPECIFIED LATERALITY: Primary | ICD-10-CM

## 2019-07-25 RX ORDER — SULFAMETHOXAZOLE/TRIMETHOPRIM 800-160 MG
1 TABLET ORAL 2 TIMES DAILY
Qty: 14 TABLET | Refills: 0 | Status: SHIPPED | OUTPATIENT
Start: 2019-07-25 | End: 2020-04-23

## 2019-07-25 NOTE — TELEPHONE ENCOUNTER
Called and spoke with patient  She feels the sty she has is worse, has only had it 3 days and now feels she may have a cellulitis  Starting on her cheek and face  denies any visual changes   Discussed possible appt she really does not want to come to [place] [facility] , discussed calling her eye doctor and being seen she will do that   She is requesting Naz to order an antibiotic for her   Advised will speak with her   JUVENCIO [facility]  RN/[place] [facility]

## 2019-07-25 NOTE — TELEPHONE ENCOUNTER
Patient called and has a sty in her left eye that is very painful.    Rose Marie Verde, [facility]

## 2019-07-26 NOTE — TELEPHONE ENCOUNTER
I did order an antibiotic for her to start.   Warm pack to the eye with the sty   Let me know how things go over the week end   Naz

## 2019-07-29 LAB
COPATH REPORT: ABNORMAL
PAP: ABNORMAL

## 2019-09-12 ENCOUNTER — OFFICE VISIT (OUTPATIENT)
Dept: OBGYN | Facility: CLINIC | Age: 44
End: 2019-09-12
Payer: COMMERCIAL

## 2019-09-12 VITALS
DIASTOLIC BLOOD PRESSURE: 69 MMHG | WEIGHT: 100 LBS | SYSTOLIC BLOOD PRESSURE: 102 MMHG | BODY MASS INDEX: 18.59 KG/M2 | HEART RATE: 76 BPM | TEMPERATURE: 98.2 F

## 2019-09-12 DIAGNOSIS — Z13.9 SCREENING PROCEDURE: ICD-10-CM

## 2019-09-12 DIAGNOSIS — R87.810 ASCUS WITH POSITIVE HIGH RISK HPV CERVICAL: Primary | ICD-10-CM

## 2019-09-12 DIAGNOSIS — R87.610 ASCUS WITH POSITIVE HIGH RISK HPV CERVICAL: Primary | ICD-10-CM

## 2019-09-12 LAB — HCG UR QL: NEGATIVE

## 2019-09-12 PROCEDURE — 81025 URINE PREGNANCY TEST: CPT | Performed by: OBSTETRICS & GYNECOLOGY

## 2019-09-12 PROCEDURE — 88305 TISSUE EXAM BY PATHOLOGIST: CPT | Performed by: OBSTETRICS & GYNECOLOGY

## 2019-09-12 PROCEDURE — 57454 BX/CURETT OF CERVIX W/SCOPE: CPT | Performed by: OBSTETRICS & GYNECOLOGY

## 2019-09-12 NOTE — PROGRESS NOTES
GYN CLINIC VISIT  2019  CC: colposcopy    HPI:  Jessie Karimi is a 43 year old female  who presents for repeat colposcopy, referred by Naz Ríos. Pap smear on 2019 showed: ASCUS and Other HR HPV. The prior pap showed LGSIL.     3/4/16: Pap - ASCUS, +HR HPV. Plan colp  3/22/16 Harvey: ECC normal. Plan: cotest in 12 mo. Tracking.   3/9/17:Pap--LSIL, neg HPV. Plan colp  17: Harvey Bx normal. Plan cotest in 1 year per provider.  18 LSIL Pap, + HR HPV (Neg 16/18). Plan colp due by 18.   6/15/18 Harvey bx and ECC: negative. Plan: cotest in 1 yr.  19 ASCUS Pap, + HR HPV (Neg 16/18). Plan colp    No LMP recorded. (Menstrual status: Birth Control).  UPT today is negative  Patient does not smoke  Type of contraception: oral contraceptive  Age at first sexual intercourse: 20  Number of sexual partners (lifetime): more than 6  Past GYN history: No STD history and HPV  Prior cervical/vaginal disease: none.  Prior cervical treatment: no treatment.    Vitals:    19 1348   BP: 102/69   BP Location: Left arm   Patient Position: Sitting   Cuff Size: Adult Regular   Pulse: 76   Temp: 98.2  F (36.8  C)   TempSrc: Oral   Weight: 45.4 kg (100 lb)       PROCEDURE:  Before the procedure, it was ensured that the patient was educated regarding the nature of her findings to date, the implications, and what was to be done. She has been made aware of the role of HPV, the natural history of infection, ways to minimize her future risk, the effect of HPV on the cervix, and treatment options available should they be indicated. The details of the colposcopic procedure were reviewed. All questions were answered before proceeding, and informed consent was therefore obtained.      Speculum placed in vagina and excellent visualization of cervix acheived, cervix swabbed x 3 with acetic acid solution.      FINDINGS:  Cervix: acetowhitening noted at 6 o'clock  SCJ seen?: no   ECC done?: Yes    Satisfactory examination?: no    Representative biopsies taken at 6 o'clock and ECC. Specimens labeled and sent to pathology. Silver nitrate used to achieve hemostasis. Patient tolerated procedure well.     ASSESSMENT: HPV related changes.    PLAN: specimens labelled and sent to Pathology, will base further treatment on Pathology findings, treatment options discussed with patient, post biopsy instructions given to patient and call to discuss Pathology results      Lala Lara MD

## 2019-09-12 NOTE — NURSING NOTE
"Chief Complaint   Patient presents with     Colposcopy       Initial /69 (BP Location: Left arm, Patient Position: Sitting, Cuff Size: Adult Regular)   Pulse 76   Temp 98.2  F (36.8  C) (Oral)   Wt 45.4 kg (100 lb)   BMI 18.59 kg/m   Estimated body mass index is 18.59 kg/m  as calculated from the following:    Height as of 19: 1.562 m (5' 1.5\").    Weight as of this encounter: 45.4 kg (100 lb).  BP completed using cuff size: regular    Questioned patient about current smoking habits.  Pt. has never smoked.          The following HM Due: NONE      The following patient reported/Care Every where data was sent to:  P ABSTRACT QUALITY INITIATIVES [[id number]]  SANAZ Carrero MA           "

## 2019-09-16 LAB — COPATH REPORT: NORMAL

## 2019-10-03 ENCOUNTER — HEALTH MAINTENANCE LETTER (OUTPATIENT)
Age: 44
End: 2019-10-03

## 2019-12-20 ENCOUNTER — HOSPITAL ENCOUNTER (OUTPATIENT)
Dept: MAMMOGRAPHY | Facility: CLINIC | Age: 44
Discharge: HOME | End: 2019-12-20
Attending: NURSE PRACTITIONER | Admitting: NURSE PRACTITIONER
Payer: COMMERCIAL

## 2019-12-20 DIAGNOSIS — Z12.31 VISIT FOR SCREENING MAMMOGRAM: ICD-10-CM

## 2019-12-20 PROCEDURE — 77063 BREAST TOMOSYNTHESIS BI: CPT

## 2020-03-11 DIAGNOSIS — Z30.41 ENCOUNTER FOR SURVEILLANCE OF CONTRACEPTIVE PILLS: ICD-10-CM

## 2020-03-11 RX ORDER — DROSPIRENONE AND ETHINYL ESTRADIOL 0.03MG-3MG
KIT ORAL
Qty: 112 TABLET | Refills: 3 | Status: SHIPPED | OUTPATIENT
Start: 2020-03-11 | End: 2020-07-21

## 2020-03-11 NOTE — TELEPHONE ENCOUNTER
Pt called and left msg that she needs a refill on her birth control medication.     Graciela Giang, [place]      23:00

## 2020-03-12 NOTE — TELEPHONE ENCOUNTER
Left message on personal answering machine to return call. Direct number provided.   Astrid Pizano RN

## 2020-04-22 ENCOUNTER — MYC MEDICAL ADVICE (OUTPATIENT)
Dept: FAMILY MEDICINE | Facility: CLINIC | Age: 45
End: 2020-04-22

## 2020-04-23 ENCOUNTER — VIRTUAL VISIT (OUTPATIENT)
Dept: FAMILY MEDICINE | Facility: CLINIC | Age: 45
End: 2020-04-23
Payer: COMMERCIAL

## 2020-04-23 DIAGNOSIS — B37.31 YEAST INFECTION OF THE VAGINA: Primary | ICD-10-CM

## 2020-04-23 PROCEDURE — 99213 OFFICE O/P EST LOW 20 MIN: CPT | Mod: 95 | Performed by: NURSE PRACTITIONER

## 2020-04-23 RX ORDER — FERROUS SULFATE 325(65) MG
325 TABLET ORAL
COMMUNITY

## 2020-04-23 RX ORDER — FLUCONAZOLE 150 MG/1
150 TABLET ORAL DAILY
Qty: 3 TABLET | Refills: 1 | Status: SHIPPED | OUTPATIENT
Start: 2020-04-23 | End: 2020-05-26

## 2020-04-23 RX ORDER — LACTOBACILLUS RHAMNOSUS GG 10B CELL
1 CAPSULE ORAL 2 TIMES DAILY
COMMUNITY

## 2020-04-23 NOTE — PROGRESS NOTES
"Jessie Karimi is a 44 year old female who is being evaluated via a billable telephone visit.      The patient has been notified of following:     \"This telephone visit will be conducted via a call between you and your physician/provider. We have found that certain health care needs can be provided without the need for a physical exam.  This service lets us provide the care you need with a short phone conversation.  If a prescription is necessary we can send it directly to your pharmacy.  If lab work is needed we can place an order for that and you can then stop by our lab to have the test done at a later time.    Telephone visits are billed at different rates depending on your insurance coverage. During this emergency period, for some insurers they may be billed the same as an in-person visit.  Please reach out to your insurance provider with any questions.    If during the course of the call the physician/provider feels a telephone visit is not appropriate, you will not be charged for this service.\"    Patient has given verbal consent for Telephone visit?  Yes    How would you like to obtain your AVS? MyChart    Subjective     Jessie Karimi is a 44 year old female who presents to clinic today for the following health issues:    HPI  Vaginal Symptoms      Duration: 1 week    Description  vaginal discharge - white creamy curd-like, itching, burning, odor and pain with intercourse (tried last week)    Intensity:  Moderate-severe    Accompanying signs and symptoms (fever/dysuria/abdominal or back pain): None    History  Sexually active: yes, single partner, contraception - oral contraceptives (combined)  Possibility of pregnancy: No  Recent antibiotic use: no     Precipitating or alleviating factors: Tried the OTC cream that helps but does not get rid of it.    Therapies tried and outcome: Monistat   Outcome: came back once it was finished        Patient Active Problem List   Diagnosis     Other acne     " Encounter for other general counseling or advice on contraception     Allergic     CARDIOVASCULAR SCREENING; LDL GOAL LESS THAN 160     Environmental allergies     Health Care Home     Tree nut allergy     Encounter for surveillance of contraceptive pills     ASCUS with positive high risk HPV cervical     Cervical high risk HPV (human papillomavirus) test positive     Past Surgical History:   Procedure Laterality Date     BIOPSY  April 2013    moles removed by Naz and biopsied; benign     C NONSPECIFIC PROCEDURE      wisdom teeth removed       Social History     Tobacco Use     Smoking status: Never Smoker     Smokeless tobacco: Never Used   Substance Use Topics     Alcohol use: Yes     Comment: occasionaly     Family History   Problem Relation Age of Onset     C.A.D. Maternal Grandmother      Cerebrovascular Disease Maternal Grandmother      Diabetes Father         Type II     Diabetes Mother         Type II     Breast Cancer Mother         Diagnosed Aug 2012; double mast + chem; remiss.     Heart Failure Mother      Diabetes Maternal Uncle      Hypertension No family hx of      Prostate Cancer No family hx of      Cancer - colorectal No family hx of          Current Outpatient Medications   Medication Sig Dispense Refill     Ascorbic Acid (VITAMIN C PO) Take by mouth daily       Cholecalciferol (VITAMIN D PO) Take 1,000 Units by mouth daily       CRANBERRY PO Take by mouth daily       drospirenone-ethinyl estradiol (MAYCOL) 3-0.03 MG tablet TAKE 1 TABLET BY MOUTH DAILY. ACTIVE TABLETS ONLY, SKIP PLACEBO PILLS. TAKE CONTINUOUSLY 112 tablet 3     ferrous sulfate (FEROSUL) 325 (65 Fe) MG tablet Take 325 mg by mouth daily (with breakfast)       lactobacillus rhamnosus, GG, (CULTURELL) capsule Take 1 capsule by mouth 2 times daily       ZYRTEC 10 MG OR TABS 1 TABLET DAILY 30 0     EPINEPHrine (EPIPEN/ADRENACLICK/OR ANY BX GENERIC EQUIV) 0.3 MG/0.3ML injection 2-pack Inject 0.3 mLs (0.3 mg) into the muscle as needed  for anaphylaxis (Patient not taking: Reported on 4/23/2020) 0.6 mL 1     Allergies   Allergen Reactions     Latex      Quinolones      Tree Nuts [Nuts]      BP Readings from Last 3 Encounters:   09/12/19 102/69   07/22/19 102/62   06/15/18 109/77    Wt Readings from Last 3 Encounters:   09/12/19 45.4 kg (100 lb)   07/22/19 46 kg (101 lb 6.4 oz)   06/15/18 52.2 kg (115 lb)                    Reviewed and updated as needed this visit by Provider         Review of Systems   ROS COMP: CONSTITUTIONAL: NEGATIVE for fever, chills, change in weight  RESP: NEGATIVE for significant cough or SOB  CV: NEGATIVE for chest pain, palpitations or peripheral edema  : normal menstrual cycles, vaginal discharge and treated it with   Does have hx of yeast infections,  It is seeming like it is more hormonal.          Objective   Reported vitals:  There were no vitals taken for this visit.   healthy, alert and no distress  PSYCH: Alert and oriented times 3; coherent speech, normal   rate and volume, able to articulate logical thoughts, able   to abstract reason, no tangential thoughts, no hallucinations   or delusions  Her affect is normal and pleasant   reviewed chart , does have intermittent yeast infections.    RESP: No cough, no audible wheezing, able to talk in full sentences  Remainder of exam unable to be completed due to telephone visits    Diagnostic Test Results:  Labs reviewed in Epic  none         Assessment/Plan:  ASSESSMENT/PLAN:      ICD-10-CM    1. Yeast infection of the vagina  B37.3 fluconazole (DIFLUCAN) 150 MG tablet       With the  History or vaginal yeast infections did order Diflucan  Reviewed preventive measure, is already limiting sweets,  And does all of the precautions.           No follow-ups on file.      Phone call duration:  5 minutes    Naz Ríos APRN-CNP

## 2020-05-24 ENCOUNTER — MYC MEDICAL ADVICE (OUTPATIENT)
Dept: FAMILY MEDICINE | Facility: CLINIC | Age: 45
End: 2020-05-24

## 2020-05-24 DIAGNOSIS — Z30.09 ENCOUNTER FOR OTHER GENERAL COUNSELING OR ADVICE ON CONTRACEPTION: Primary | ICD-10-CM

## 2020-05-24 DIAGNOSIS — B37.31 YEAST INFECTION OF THE VAGINA: ICD-10-CM

## 2020-05-26 RX ORDER — FLUCONAZOLE 150 MG/1
150 TABLET ORAL DAILY
Qty: 3 TABLET | Refills: 0 | Status: SHIPPED | OUTPATIENT
Start: 2020-05-26 | End: 2020-07-21

## 2020-07-09 ENCOUNTER — MYC MEDICAL ADVICE (OUTPATIENT)
Dept: FAMILY MEDICINE | Facility: CLINIC | Age: 45
End: 2020-07-09

## 2020-07-21 ENCOUNTER — VIRTUAL VISIT (OUTPATIENT)
Dept: FAMILY MEDICINE | Facility: CLINIC | Age: 45
End: 2020-07-21
Payer: COMMERCIAL

## 2020-07-21 DIAGNOSIS — R10.9 ABDOMINAL WALL PAIN: Primary | ICD-10-CM

## 2020-07-21 PROCEDURE — 99214 OFFICE O/P EST MOD 30 MIN: CPT | Mod: GT | Performed by: INTERNAL MEDICINE

## 2020-07-21 RX ORDER — METAXALONE 800 MG/1
800 TABLET ORAL 3 TIMES DAILY
Qty: 30 TABLET | Refills: 0 | Status: SHIPPED | OUTPATIENT
Start: 2020-07-21 | End: 2020-09-21

## 2020-07-21 RX ORDER — NAPROXEN 500 MG/1
500 TABLET ORAL 2 TIMES DAILY WITH MEALS
Qty: 20 TABLET | Refills: 0 | Status: SHIPPED | OUTPATIENT
Start: 2020-07-21 | End: 2021-09-23

## 2020-07-21 ASSESSMENT — ENCOUNTER SYMPTOMS
CHILLS: 0
BLOOD IN STOOL: 0
FEVER: 0
NAUSEA: 0
VOMITING: 0
FATIGUE: 0
DIARRHEA: 0
UNEXPECTED WEIGHT CHANGE: 0
ABDOMINAL DISTENTION: 0
CONSTIPATION: 0

## 2020-07-21 NOTE — PROGRESS NOTES
"Jessie Karimi is a 44 year old female who is being evaluated via a billable video visit.      The patient has been notified of following:     \"This video visit will be conducted via a call between you and your physician/provider. We have found that certain health care needs can be provided without the need for an in-person physical exam.  This service lets us provide the care you need with a video conversation.  If a prescription is necessary we can send it directly to your pharmacy.  If lab work is needed we can place an order for that and you can then stop by our lab to have the test done at a later time.    Video visits are billed at different rates depending on your insurance coverage.  Please reach out to your insurance provider with any questions.    If during the course of the call the physician/provider feels a video visit is not appropriate, you will not be charged for this service.\"    Patient has given verbal consent for Video visit? Yes  How would you like to obtain your AVS? MyChart  If you are dropped from the video visit, the video invite should be resent to: Text to cell phone: [phone]  Will anyone else be joining your video visit? No        Video-Visit Details    Video Start Time: 9:16 am  Video End Time: 10 pm    Originating Location (pt. Location): home    Distant Location (provider location):  [facility]     Platform used for Video Visit: M2G (Am.Well)      HPI    Abdominal Pain    Duration: 6 days    Description (location/character/radiation): 1-2 inches above naval       Associated flank pain: None    Intensity:  mild    Accompanying signs and symptoms:        Fever/Chills: no        Gas/Bloating: no        Nausea/vomitting: no        Diarrhea: no        Dysuria or Hematuria: no     History (previous similar pain/trauma/previous testing): none    Precipitating or alleviating factors:       Pain worse with eating/BM/urination: none       Pain relieved by " BM: no     Therapies tried and outcome: None    LMP-- 7/6/2020        Pain is reproducible by applying pressure to the affected area such as light palpation and when the patient leans over the kitchen counter with the edge of the counter against her abdomen.  Pain is aggravated by certain positions such as leaning forward.  No bulging noted.  No erythema or skin changes at the affected area.  No trauma to the affected area.  She exercises regularly.      Review of Systems   Constitutional: Negative for chills, fatigue, fever and unexpected weight change.   Gastrointestinal: Negative for abdominal distention, blood in stool, constipation, diarrhea, nausea and vomiting.         ICD-10-CM    1. Abdominal wall pain -- given reproducibility by superficial palpation as well as positional nature; no gastrointestinal or other symptoms to suggest intra-abdominal pathology; no red flags for surgical abdomen R10.9 metaxalone (SKELAXIN) 800 MG tablet     naproxen (NAPROSYN) 500 MG tablet       Dr. Dieter Starks

## 2020-07-22 NOTE — PROGRESS NOTES
"Jessie Karimi is a 44 year old female who is being evaluated via a billable telephone visit.      The patient has been notified of following:     \"This telephone visit will be conducted via a call between you and your physician/provider. We have found that certain health care needs can be provided without the need for a physical exam.  This service lets us provide the care you need with a short phone conversation.  If a prescription is necessary we can send it directly to your pharmacy.  If lab work is needed we can place an order for that and you can then stop by our lab to have the test done at a later time.    Telephone visits are billed at different rates depending on your insurance coverage. During this emergency period, for some insurers they may be billed the same as an in-person visit.  Please reach out to your insurance provider with any questions.    If during the course of the call the physician/provider feels a telephone visit is not appropriate, you will not be charged for this service.\"    Patient has given verbal consent for Telephone visit?  Yes    What phone number would you like to be contacted at? [phone]    How would you like to obtain your AVS? MyChart    Subjective     Jessie Karimi is a 44 year old female who presents via phone visit today for the following health issues:    HPI     Patient notes 8 days of achy abdominal pain that feels like a bruise 1 inch above umbilicus that feels as if it is in fat layer. Pain stays in the same area and is constant. No fever, GI distress, or deeper pain.  Patient denies mass, redness, rash, radiation of pain. No other tenderness to palpation of abdomen. Pain at baseline rated 3-4/10. No history of similar symptoms.     Patient was evaluated three days ago and was treated for musculoskeletal pain with a muscle relaxer. Patient notes she stopped oral contraceptives 7 weeks ago. History of ovarian cysts.     Abdominal Pain      Duration: past 8 " days     Description (location/character/radiation): pain above belly button. Feels the pain when bending over, sitting, or with pressure to the area.        Associated flank pain: None    Intensity:  Mild-moderate     Accompanying signs and symptoms:        Fever/Chills: no        Gas/Bloating: no but feels there is possible swelling around the area        Nausea/vomitting: no        Diarrhea: no        Dysuria or Hematuria: no     History (previous similar pain/trauma/previous testing): none     Precipitating or alleviating factors:       Pain worse with eating/BM/urination: none        Pain relieved by BM: no     Therapies tried and outcome: None    LMP:  07/05/2020    Patient Active Problem List   Diagnosis     Other acne     Encounter for other general counseling or advice on contraception     Allergic     CARDIOVASCULAR SCREENING; LDL GOAL LESS THAN 160     Environmental allergies     Health Care Home     Tree nut allergy     Encounter for surveillance of contraceptive pills     ASCUS with positive high risk HPV cervical     Cervical high risk HPV (human papillomavirus) test positive     Past Surgical History:   Procedure Laterality Date     BIOPSY  April 2013    moles removed by Naz and biopsied; benign     C NONSPECIFIC PROCEDURE      wisdom teeth removed       Social History     Tobacco Use     Smoking status: Never Smoker     Smokeless tobacco: Never Used   Substance Use Topics     Alcohol use: Yes     Comment: occasionaly     Family History   Problem Relation Age of Onset     C.A.D. Maternal Grandmother      Cerebrovascular Disease Maternal Grandmother      Diabetes Father         Type II     Diabetes Mother         Type II     Breast Cancer Mother         Diagnosed Aug 2012; double mast + chem; remiss.     Heart Failure Mother      Diabetes Maternal Uncle      Hypertension No family hx of      Prostate Cancer No family hx of      Cancer - colorectal No family hx of          Current Outpatient  Medications   Medication Sig Dispense Refill     Ascorbic Acid (VITAMIN C PO) Take by mouth daily       Cholecalciferol (VITAMIN D PO) Take 1,000 Units by mouth daily       CRANBERRY PO Take by mouth daily       ferrous sulfate (FEROSUL) 325 (65 Fe) MG tablet Take 325 mg by mouth daily (with breakfast)       lactobacillus rhamnosus, GG, (CULTURELL) capsule Take 1 capsule by mouth 2 times daily       naproxen (NAPROSYN) 500 MG tablet Take 1 tablet (500 mg) by mouth 2 times daily (with meals) 20 tablet 0     UNABLE TO FIND MEDICATION NAME: Chinese herbs - to help hormones       ZYRTEC 10 MG OR TABS 1 TABLET DAILY 30 0     EPINEPHrine (EPIPEN/ADRENACLICK/OR ANY BX GENERIC EQUIV) 0.3 MG/0.3ML injection 2-pack Inject 0.3 mLs (0.3 mg) into the muscle as needed for anaphylaxis 0.6 mL 1     metaxalone (SKELAXIN) 800 MG tablet Take 1 tablet (800 mg) by mouth 3 times daily (Patient not taking: Reported on 7/23/2020) 30 tablet 0     Allergies   Allergen Reactions     Latex      Quinolones      Tree Nuts [Nuts]        Reviewed and updated as needed this visit by Provider  Tobacco  Allergies  Meds  Problems  Med Hx  Surg Hx  Fam Hx         Review of Systems   Constitutional: Negative for fever.   HENT: Negative for congestion.    Respiratory: Negative for cough and shortness of breath.    Cardiovascular: Negative for chest pain.   Gastrointestinal: Positive for abdominal pain (3 cm superior to umbilicus in abdominal wall). Negative for diarrhea, heartburn and nausea.   Genitourinary: Negative for dysuria and hematuria.   Musculoskeletal: Negative for myalgias.   Skin: Negative for rash.   Neurological: Negative for light-headedness.   Psychiatric/Behavioral: The patient is not nervous/anxious.              Objective   Reported vitals:  There were no vitals taken for this visit.   healthy, alert and no distress  PSYCH: Alert and oriented times 3; coherent speech, normal   rate and volume, able to articulate logical  thoughts, able   to abstract reason, no tangential thoughts, no hallucinations   or delusions  Her affect is normal  RESP: No cough, no audible wheezing, able to talk in full sentences  Remainder of exam unable to be completed due to telephone visits    Diagnostic Test Results:  Labs reviewed in Epic        Assessment/Plan:    1. Abdominal wall pain  Low suspicion for acute abdomen as tenderness is within specific location only and without surrounding tenderness to palpation.  No other intra-abdominal symptoms.  Symptoms are most consistent with possible umbilical hernia.  Ultrasound order placed for further evaluation.  Discussed return precautions.  Recommended Tylenol/ibuprofen for pain management.  We will contact patient regarding results and next steps.    - US Abdomen Limited; Future    Return in about 1 week (around 7/30/2020), or if symptoms worsen or fail to improve.      Phone call duration:  15 minutes    Lizzie Segal PA-C

## 2020-07-23 ENCOUNTER — VIRTUAL VISIT (OUTPATIENT)
Dept: FAMILY MEDICINE | Facility: CLINIC | Age: 45
End: 2020-07-23
Payer: COMMERCIAL

## 2020-07-23 DIAGNOSIS — R10.9 ABDOMINAL WALL PAIN: Primary | ICD-10-CM

## 2020-07-23 PROCEDURE — 99214 OFFICE O/P EST MOD 30 MIN: CPT | Mod: TEL | Performed by: PHYSICIAN ASSISTANT

## 2020-07-23 ASSESSMENT — ENCOUNTER SYMPTOMS
LIGHT-HEADEDNESS: 0
NAUSEA: 0
COUGH: 0
DYSURIA: 0
NERVOUS/ANXIOUS: 0
SHORTNESS OF BREATH: 0
ABDOMINAL PAIN: 1
DIARRHEA: 0
HEMATURIA: 0
HEARTBURN: 0
FEVER: 0
MYALGIAS: 0

## 2020-07-23 NOTE — PATIENT INSTRUCTIONS
Hi Jessie,     It is unclear what specifically is causing your abdominal pain, although it does sound like you may have a small hernia.  An ultrasound has been ordered for further evaluation. For discomfort you may use Tylenol/ibuprofen. Please contact clinic if you experience new or worsening symptoms.     Take Care,  Lizzie Segal PA-C     Patient Education     Abdominal Pain  Abdominal pain is pain in the stomach or belly area. Everyone has this pain from time to time. In many cases it goes away on its own. But abdominal pain can sometimes be due to a serious problem, such as appendicitis. So it s important to know when to get help.  Causes of abdominal pain  There are many possible causes of abdominal pain. Common causes in adults include:    Constipation, diarrhea, or gas    Stomach acid flowing back up into the esophagus (acid reflux or heartburn)    Severe acid reflux, called GERD (gastroesophageal reflux disease)    A sore in the lining of the stomach or small intestine (peptic ulcer)    Inflammation of the gallbladder, liver, or pancreas    Gallstones or kidney stones    Appendicitis     Intestinal blockage     An internal organ pushing through a muscle or other tissue (hernia)    Urinary tract infections    In women, menstrual cramps, fibroids, ovarian cysts, pelvic inflammatory disease, or endometriosis    Inflammation or infection of the intestines, including Crohn's disease and ulcerative colitis    Irritable bowel syndrome  Diagnosing the cause of abdominal pain  Your healthcare provider will give you a physical exam help find the cause of your pain. If needed, you will have tests. Belly pain has many possible causes. So it can be hard to find the reason for your pain. Giving details about your pain can help. Tell your provider where and when you feel the pain, and what makes it better or worse. Also let your provider know if you have other symptoms such as:    Fever    Tiredness    Upset stomach  (nausea)    Vomiting    Changes in bathroom habits    Blood in the stool or black, tarry stool    Weight loss that you can't explain (involuntary weight loss?)  Also report any family history of stomach or intestinal problems, or cancers. Tell your provider about all your alcohol use and drug use. Tell your provider about all medicines you use, including herbs, vitamins, and supplements.   Treating abdominal pain  Some causes of pain need emergency medical treatment right away. These include appendicitis or a bowel blockage. Other problems can be treated with rest, fluids, or medicines. Your healthcare provider can give you specific instructions for treatment or self-care based on what is causing your pain.     If you have vomiting or diarrhea, sip water or other clear fluids. When you are ready to eat solid foods again, start with small amounts of easy-to-digest, low-fat foods. These include apple sauce, toast, or crackers.  When to get medical care  Call 911 or go to the hospital right away if you:    Can t pass stool and are vomiting    Are vomiting blood or have bloody diarrhea or black, tarry diarrhea    Have chest, neck, or shoulder pain    Feel like you might pass out    Have pain in your shoulder blades with nausea    Have sudden, severe belly pain    Have new, severe pain unlike any you have felt before    Have a belly that is rigid, hard, and hurts to touch  Call your healthcare provider if you have:    Pain for more than 5 days    Bloating for more than 2 days    Diarrhea for more than 5 days    A fever of 100.4 F (38 C) or higher, or as directed by your healthcare provider    Pain that gets worse    Weight loss for no reason    Continued lack of appetite    Blood in your stool  How to prevent abdominal pain  Here are some tips to help prevent abdominal pain:    Eat smaller amounts of food at each meal.    Don't eat greasy, fried, or other high-fat foods.    Don't eat foods that give you gas.    Exercise  regularly.    Drink plenty of fluids.  To help prevent GERD symptoms:    Quit smoking.    Reduce alcohol and foods that increase stomach acid.    Don't use aspirin or over-the-counter pain and fever medicines, if possible. This includes nonsteroidal anti-inflammatory drugs (NSAIDs).    Lose excess weight.    Finish eating at least 2 hours before you go to bed or lie down.    Raise the head of your bed.  Date Last Reviewed: 7/1/2016 2000-2019 [employer]. [address] [place] [address], [place] [address]. All rights reserved. This information is not intended as a substitute for professional medical care. Always follow your healthcare professional's instructions.

## 2020-07-24 ENCOUNTER — HOSPITAL ENCOUNTER (OUTPATIENT)
Dept: ULTRASOUND IMAGING | Facility: CLINIC | Age: 45
Discharge: HOME | End: 2020-07-24
Attending: PHYSICIAN ASSISTANT | Admitting: PHYSICIAN ASSISTANT
Payer: COMMERCIAL

## 2020-07-24 DIAGNOSIS — R10.9 ABDOMINAL WALL PAIN: ICD-10-CM

## 2020-07-24 PROCEDURE — 76705 ECHO EXAM OF ABDOMEN: CPT

## 2020-09-21 ENCOUNTER — OFFICE VISIT (OUTPATIENT)
Dept: FAMILY MEDICINE | Facility: CLINIC | Age: 45
End: 2020-09-21
Payer: COMMERCIAL

## 2020-09-21 VITALS
BODY MASS INDEX: 19.19 KG/M2 | WEIGHT: 104.25 LBS | DIASTOLIC BLOOD PRESSURE: 66 MMHG | HEIGHT: 62 IN | RESPIRATION RATE: 14 BRPM | SYSTOLIC BLOOD PRESSURE: 100 MMHG | HEART RATE: 60 BPM

## 2020-09-21 DIAGNOSIS — Z13.220 LIPID SCREENING: ICD-10-CM

## 2020-09-21 DIAGNOSIS — Z00.00 ROUTINE GENERAL MEDICAL EXAMINATION AT A HEALTH CARE FACILITY: Primary | ICD-10-CM

## 2020-09-21 DIAGNOSIS — Z11.51 SCREENING FOR HUMAN PAPILLOMAVIRUS: ICD-10-CM

## 2020-09-21 DIAGNOSIS — Z11.3 SCREEN FOR STD (SEXUALLY TRANSMITTED DISEASE): ICD-10-CM

## 2020-09-21 DIAGNOSIS — Z23 ENCOUNTER FOR IMMUNIZATION: ICD-10-CM

## 2020-09-21 DIAGNOSIS — Z12.4 SCREENING FOR CERVICAL CANCER: ICD-10-CM

## 2020-09-21 LAB
ALBUMIN SERPL-MCNC: 4.2 G/DL (ref 3.4–5)
ALP SERPL-CCNC: 44 U/L (ref 40–150)
ALT SERPL W P-5'-P-CCNC: 34 U/L (ref 0–50)
ANION GAP SERPL CALCULATED.3IONS-SCNC: 3 MMOL/L (ref 3–14)
AST SERPL W P-5'-P-CCNC: 14 U/L (ref 0–45)
BILIRUB SERPL-MCNC: 0.6 MG/DL (ref 0.2–1.3)
BUN SERPL-MCNC: 14 MG/DL (ref 7–30)
CALCIUM SERPL-MCNC: 9.4 MG/DL (ref 8.5–10.1)
CHLORIDE SERPL-SCNC: 105 MMOL/L (ref 94–109)
CHOLEST SERPL-MCNC: 206 MG/DL
CO2 SERPL-SCNC: 30 MMOL/L (ref 20–32)
CREAT SERPL-MCNC: 0.56 MG/DL (ref 0.52–1.04)
GFR SERPL CREATININE-BSD FRML MDRD: >90 ML/MIN/{1.73_M2}
GLUCOSE SERPL-MCNC: 93 MG/DL (ref 70–99)
HDLC SERPL-MCNC: 84 MG/DL
LDLC SERPL CALC-MCNC: 110 MG/DL
NONHDLC SERPL-MCNC: 122 MG/DL
POTASSIUM SERPL-SCNC: 3.8 MMOL/L (ref 3.4–5.3)
PROT SERPL-MCNC: 7.5 G/DL (ref 6.8–8.8)
SODIUM SERPL-SCNC: 138 MMOL/L (ref 133–144)
TRIGL SERPL-MCNC: 58 MG/DL

## 2020-09-21 PROCEDURE — 90686 IIV4 VACC NO PRSV 0.5 ML IM: CPT | Performed by: NURSE PRACTITIONER

## 2020-09-21 PROCEDURE — 80053 COMPREHEN METABOLIC PANEL: CPT | Performed by: NURSE PRACTITIONER

## 2020-09-21 PROCEDURE — 90471 IMMUNIZATION ADMIN: CPT | Performed by: NURSE PRACTITIONER

## 2020-09-21 PROCEDURE — 87591 N.GONORRHOEAE DNA AMP PROB: CPT | Performed by: NURSE PRACTITIONER

## 2020-09-21 PROCEDURE — G0145 SCR C/V CYTO,THINLAYER,RESCR: HCPCS | Performed by: NURSE PRACTITIONER

## 2020-09-21 PROCEDURE — 99396 PREV VISIT EST AGE 40-64: CPT | Mod: 25 | Performed by: NURSE PRACTITIONER

## 2020-09-21 PROCEDURE — 87624 HPV HI-RISK TYP POOLED RSLT: CPT | Performed by: NURSE PRACTITIONER

## 2020-09-21 PROCEDURE — 80061 LIPID PANEL: CPT | Performed by: NURSE PRACTITIONER

## 2020-09-21 PROCEDURE — 87491 CHLMYD TRACH DNA AMP PROBE: CPT | Performed by: NURSE PRACTITIONER

## 2020-09-21 PROCEDURE — 36415 COLL VENOUS BLD VENIPUNCTURE: CPT | Performed by: NURSE PRACTITIONER

## 2020-09-21 ASSESSMENT — PAIN SCALES - GENERAL: PAINLEVEL: NO PAIN (0)

## 2020-09-21 ASSESSMENT — MIFFLIN-ST. JEOR: SCORE: 1073.18

## 2020-09-21 NOTE — PROGRESS NOTES
SUBJECTIVE:   CC: Jessie Karimi is an 44 year old woman who presents for preventive health visit.       Patient has been advised of split billing requirements and indicates understanding: Yes  Healthy Habits:    Do you get at least three servings of calcium containing foods daily (dairy, green leafy vegetables, etc.)? yes    Amount of exercise or daily activities, outside of work: 6 day(s) per week    Problems taking medications regularly not applicable    Medication side effects: No    Have you had an eye exam in the past two years? yes    Do you see a dentist twice per year? yes    Do you have sleep apnea, excessive snoring or daytime drowsiness?no          Today's PHQ-2 Score:   PHQ-2 ( 1999 Pfizer) 4/23/2020 7/22/2019   Q1: Little interest or pleasure in doing things 0 0   Q2: Feeling down, depressed or hopeless 0 0   PHQ-2 Score 0 0   Q1: Little interest or pleasure in doing things - -   Q2: Feeling down, depressed or hopeless - -   PHQ-2 Score - -       Abuse: Current or Past(Physical, Sexual or Emotional)- No  Do you feel safe in your environment? Yes        Social History     Tobacco Use     Smoking status: Never Smoker     Smokeless tobacco: Never Used   Substance Use Topics     Alcohol use: Yes     Comment: occasionaly     If you drink alcohol do you typically have >3 drinks per day or >7 drinks per week? No                     Reviewed orders with patient.  Reviewed health maintenance and updated orders accordingly - Yes  BP Readings from Last 3 Encounters:   09/21/20 100/66   09/12/19 102/69   07/22/19 102/62    Wt Readings from Last 3 Encounters:   09/21/20 47.3 kg (104 lb 4 oz)   09/12/19 45.4 kg (100 lb)   07/22/19 46 kg (101 lb 6.4 oz)                  Patient Active Problem List   Diagnosis     Other acne     Encounter for other general counseling or advice on contraception     Allergic     CARDIOVASCULAR SCREENING; LDL GOAL LESS THAN 160     Environmental allergies     Health Care Home      Tree nut allergy     Encounter for surveillance of contraceptive pills     ASCUS with positive high risk HPV cervical     Cervical high risk HPV (human papillomavirus) test positive     Past Surgical History:   Procedure Laterality Date     BIOPSY  April 2013    moles removed by Naz and biopsied; benign     C NONSPECIFIC PROCEDURE      wisdom teeth removed       Social History     Tobacco Use     Smoking status: Never Smoker     Smokeless tobacco: Never Used   Substance Use Topics     Alcohol use: Yes     Comment: occasionaly     Family History   Problem Relation Age of Onset     C.A.D. Maternal Grandmother      Cerebrovascular Disease Maternal Grandmother      Diabetes Father         Type II     Diabetes Mother         Type II     Breast Cancer Mother         Diagnosed Aug 2012; double mast + chem; remiss.     Heart Failure Mother      Diabetes Maternal Uncle      Hypertension No family hx of      Prostate Cancer No family hx of      Cancer - colorectal No family hx of            Mammogram not appropriate for this patient based on age.    Pertinent mammograms are reviewed under the imaging tab.  History of abnormal Pap smear: NO - age 30- 65 PAP every 3 years recommended  PAP / HPV Latest Ref Rng & Units 7/22/2019 5/11/2018 3/9/2017   PAP - ASC-US(A) LSIL(A) LSIL(A)   HPV 16 DNA NEG:Negative Negative Negative Negative   HPV 18 DNA NEG:Negative Negative Negative Negative   OTHER HR HPV NEG:Negative Positive(A) Positive(A) Negative     Reviewed and updated as needed this visit by clinical staff  Tobacco  Allergies  Meds  Med Hx  Surg Hx  Fam Hx  Soc Hx        Reviewed and updated as needed this visit by Provider            ROS:  CONSTITUTIONAL: NEGATIVE for fever, chills, change in weight  INTEGUMENTARU/SKIN: NEGATIVE for worrisome rashes, moles or lesions  EYES: NEGATIVE for vision changes or irritation, due for eye exam    ENT: NEGATIVE for ear, mouth and throat problems, current with dentist   RESP:  "NEGATIVE for significant cough or SOB  BREAST: NEGATIVE for masses, tenderness or discharge  CV: NEGATIVE for chest pain, palpitations or peripheral edema  GI: NEGATIVE for nausea, abdominal pain, heartburn, or change in bowel habits, several months ago did have increased stress, had mid- abdominal pain, did have an ultrasound  - is normal.     female: no unusual urinary symptoms, menses: frequency: every 28-10 days days and is having yeast type of symptoms  and did go off the pill and periods are getting to normal after going off the pill ,  Periods are light ,  Lasting 3 days   MUSCULOSKELETAL: NEGATIVE for significant arthralgias or myalgia  NEURO: NEGATIVE for weakness, dizziness or paresthesias  ENDOCRINE: NEGATIVE for temperature intolerance, skin/hair changes  HEME/ALLERGY/IMMUNE: NEGATIVE for bleeding problems  PSYCHIATRIC: POSITIVE marital problems and is  is doing well , in counseling  Likes living by herself.  She and  still see each other.     OBJECTIVE:   /66   Pulse 60   Resp 14   Ht 1.562 m (5' 1.5\")   Wt 47.3 kg (104 lb 4 oz)   LMP 09/06/2020 (Approximate)   Breastfeeding No   BMI 19.38 kg/m    EXAM:  GENERAL: healthy, alert and no distress  EYES: Eyes grossly normal to inspection, PERRL and conjunctivae and sclerae normal  HENT: ear canals and TM's normal, nose and mouth without ulcers or lesions, teeth in good repair   NECK: no adenopathy, no asymmetry, masses, or scars and thyroid normal to palpation  RESP: lungs clear to auscultation - no rales, rhonchi or wheezes  BREAST: normal without masses, tenderness or nipple discharge and no palpable axillary masses or adenopathy  CV: regular rate and rhythm, normal S1 S2, no S3 or S4, no murmur, click or rub, no peripheral edema and peripheral pulses strong  ABDOMEN: soft, nontender, no hepatosplenomegaly, no masses and bowel sounds normal   (female): normal female external genitalia, normal urethral meatus, vaginal " mucosa pink, moist, well rugated, and normal cervix/adnexa/uterus without masses or discharge  MS: no gross musculoskeletal defects noted, no edema  SKIN: no suspicious lesions or rashes  NEURO: Normal strength and tone, mentation intact and speech normal  PSYCH: mentation appears normal, affect normal/bright   is doing well with the separation    LYMPH: no cervical, supraclavicular, axillary, or inguinal adenopathy    Diagnostic Test Results:  Labs reviewed in Epic  Pending     ASSESSMENT/PLAN:     ASSESSMENT/PLAN:      ICD-10-CM    1. Routine general medical examination at a health care facility  Z00.00 Comprehensive metabolic panel   2. Screening for cervical cancer  Z12.4 Pap imaged thin layer screen with HPV - recommended age 30 - 65     HPV High Risk Types DNA Cervical   3. Screening for human papillomavirus  Z11.51 HPV High Risk Types DNA Cervical   4. Screen for STD (sexually transmitted disease)  Z11.3 NEISSERIA GONORRHOEA PCR     CHLAMYDIA TRACHOMATIS PCR   5. Encounter for immunization  Z23 INFLUENZA VACCINE IM > 6 MONTHS VALENT IIV4 [75119]   6. Lipid screening  Z13.220 Lipid panel reflex to direct LDL Fasting     Comprehensive metabolic panel       Patient Instructions     Preventive Health Recommendations  Female Ages 40 to 49    Yearly exam:     See your health care provider every year in order to  1. Review health changes.   2. Discuss preventive care.    3. Review your medicines if your doctor prescribed any.      Get a Pap test every three years (unless you have an abnormal result and your provider advises testing more often).      If you get Pap tests with HPV test, you only need to test every 5 years, unless you have an abnormal result. You do not need a Pap test if your uterus was removed (hysterectomy) and you have not had cancer.      You should be tested each year for STDs (sexually transmitted diseases), if you're at risk.     Ask your doctor if you should have a mammogram.      Have a  "colonoscopy (test for colon cancer) if someone in your family has had colon cancer or polyps before age 50.       Have a cholesterol test every 5 years.       Have a diabetes test (fasting glucose) after age 45. If you are at risk for diabetes, you should have this test every 3 years.    Shots: Get a flu shot each year. Get a tetanus shot every 10 years.     Nutrition:     Eat at least 5 servings of fruits and vegetables each day.    Eat whole-grain bread, whole-wheat pasta and brown rice instead of white grains and rice.    Get adequate Calcium and Vitamin D.      Lifestyle    Exercise at least 150 minutes a week (an average of 30 minutes a day, 5 days a week). This will help you control your weight and prevent disease.    Limit alcohol to one drink per day.    No smoking.     Wear sunscreen to prevent skin cancer.    See your dentist every six months for an exam and cleaning.    Good luck with everything , continue with counseling     I will send the results     You did get the flu shot.               Patient has been advised of split billing requirements and indicates understanding: Yes  COUNSELING:   Reviewed preventive health counseling, as reflected in patient instructions       Regular exercise       Healthy diet/nutrition       Vision screening       Immunizations    Vaccinated for: Influenza    Is already in marital counseling .  Is doing well.          Safe sex practices/STD prevention    Estimated body mass index is 19.38 kg/m  as calculated from the following:    Height as of this encounter: 1.562 m (5' 1.5\").    Weight as of this encounter: 47.3 kg (104 lb 4 oz).        She reports that she has never smoked. She has never used smokeless tobacco.      Counseling Resources:  ATP IV Guidelines  Pooled Cohorts Equation Calculator  Breast Cancer Risk Calculator  BRCA-Related Cancer Risk Assessment: FHS-7 Tool  FRAX Risk Assessment  ICSI Preventive Guidelines  Dietary Guidelines for Americans, 2010  USDA's " MyPlate  ASA Prophylaxis  Lung CA Screening    CINDY MARTINEZ NP, APRN CNP  [facility]

## 2020-09-21 NOTE — PATIENT INSTRUCTIONS
Preventive Health Recommendations  Female Ages 40 to 49    Yearly exam:     See your health care provider every year in order to  1. Review health changes.   2. Discuss preventive care.    3. Review your medicines if your doctor prescribed any.      Get a Pap test every three years (unless you have an abnormal result and your provider advises testing more often).      If you get Pap tests with HPV test, you only need to test every 5 years, unless you have an abnormal result. You do not need a Pap test if your uterus was removed (hysterectomy) and you have not had cancer.      You should be tested each year for STDs (sexually transmitted diseases), if you're at risk.     Ask your doctor if you should have a mammogram.      Have a colonoscopy (test for colon cancer) if someone in your family has had colon cancer or polyps before age 50.       Have a cholesterol test every 5 years.       Have a diabetes test (fasting glucose) after age 45. If you are at risk for diabetes, you should have this test every 3 years.    Shots: Get a flu shot each year. Get a tetanus shot every 10 years.     Nutrition:     Eat at least 5 servings of fruits and vegetables each day.    Eat whole-grain bread, whole-wheat pasta and brown rice instead of white grains and rice.    Get adequate Calcium and Vitamin D.      Lifestyle    Exercise at least 150 minutes a week (an average of 30 minutes a day, 5 days a week). This will help you control your weight and prevent disease.    Limit alcohol to one drink per day.    No smoking.     Wear sunscreen to prevent skin cancer.    See your dentist every six months for an exam and cleaning.    Good luck with everything     I will send the results     You did get the flu shot.

## 2020-09-22 LAB
C TRACH DNA SPEC QL NAA+PROBE: NEGATIVE
N GONORRHOEA DNA SPEC QL NAA+PROBE: NEGATIVE
SPECIMEN SOURCE: NORMAL
SPECIMEN SOURCE: NORMAL

## 2020-09-25 ENCOUNTER — MYC MEDICAL ADVICE (OUTPATIENT)
Dept: FAMILY MEDICINE | Facility: CLINIC | Age: 45
End: 2020-09-25

## 2020-09-26 LAB
COPATH REPORT: NORMAL
PAP: NORMAL

## 2020-09-29 ENCOUNTER — PATIENT OUTREACH (OUTPATIENT)
Dept: FAMILY MEDICINE | Facility: CLINIC | Age: 45
End: 2020-09-29

## 2020-09-29 DIAGNOSIS — R87.810 ASCUS WITH POSITIVE HIGH RISK HPV CERVICAL: ICD-10-CM

## 2020-09-29 DIAGNOSIS — R87.610 ASCUS WITH POSITIVE HIGH RISK HPV CERVICAL: ICD-10-CM

## 2020-09-29 NOTE — TELEPHONE ENCOUNTER
3/4/16: Pap - ASCUS, +HR HPV. Plan colp  3/22/16 Jachin: ECC normal. Plan: cotest in 12 mo. Tracking.   3/9/17:Pap--LSIL, neg HPV. Plan colp  05/17/17: Jachin Bx normal. Plan cotest in 1 year per provider.  5/11/18 LSIL Pap, + HR HPV (Neg 16/18). Plan colp due by 8/11/18.   6/15/18 Jachin bx and ECC: negative. Plan: cotest in 1 yr.  7/22/19 ASCUS Pap, + HR HPV (Neg 16/18). Plan colp  09/12/19: Jachin Bx - cannot exclude low grade dysplasia. Plan cotest in 1 year.   9/21/20 NIL Pap, + HR HPV (neg 16/18). Plan cotest in 1 year.   9/29/20 MyChart result note sent.

## 2020-10-19 ENCOUNTER — MYC MEDICAL ADVICE (OUTPATIENT)
Dept: FAMILY MEDICINE | Facility: CLINIC | Age: 45
End: 2020-10-19

## 2020-10-19 DIAGNOSIS — B37.31 YEAST INFECTION OF THE VAGINA: Primary | ICD-10-CM

## 2020-10-21 RX ORDER — FLUCONAZOLE 150 MG/1
150 TABLET ORAL DAILY
Qty: 3 TABLET | Refills: 6 | Status: SHIPPED | OUTPATIENT
Start: 2020-10-21 | End: 2020-10-24

## 2021-01-08 ENCOUNTER — HOSPITAL ENCOUNTER (OUTPATIENT)
Dept: MAMMOGRAPHY | Facility: CLINIC | Age: 46
Discharge: HOME | End: 2021-01-08
Attending: NURSE PRACTITIONER | Admitting: NURSE PRACTITIONER
Payer: COMMERCIAL

## 2021-01-08 DIAGNOSIS — Z12.31 VISIT FOR SCREENING MAMMOGRAM: ICD-10-CM

## 2021-01-08 PROCEDURE — 77063 BREAST TOMOSYNTHESIS BI: CPT

## 2021-08-24 ENCOUNTER — VIRTUAL VISIT (OUTPATIENT)
Dept: FAMILY MEDICINE | Facility: CLINIC | Age: 46
End: 2021-08-24
Payer: COMMERCIAL

## 2021-08-24 DIAGNOSIS — F41.0 PANIC ATTACK: Primary | ICD-10-CM

## 2021-08-24 DIAGNOSIS — F41.0 ANXIETY ATTACK: ICD-10-CM

## 2021-08-24 PROCEDURE — 99213 OFFICE O/P EST LOW 20 MIN: CPT | Mod: 95 | Performed by: INTERNAL MEDICINE

## 2021-08-24 RX ORDER — LORAZEPAM 0.5 MG/1
TABLET ORAL
Qty: 12 TABLET | Refills: 0 | Status: SHIPPED | OUTPATIENT
Start: 2021-08-24 | End: 2022-01-18

## 2021-08-24 NOTE — PROGRESS NOTES
"Jessie is a 45 year old who is being evaluated via a billable telephone visit.      What phone number would you like to be contacted at? [phone]  How would you like to obtain your AVS? [facility]    Assessment & Plan   Problem List Items Addressed This Visit     None      Visit Diagnoses     Panic attack    -  Primary    Relevant Medications    LORazepam (ATIVAN) 0.5 MG tablet    Anxiety attack        Relevant Medications    LORazepam (ATIVAN) 0.5 MG tablet         Advised to use 1 to 2 tablets of lorazepam 0.5 mg dose as needed during flight trips, patient reports having anxiety and panic attacks during flying.  Avoid intake with alcohol when driving or using fine machinery.  Discussed side effects of medication.  Patient in agreement with plan.         See Patient Instructions    No follow-ups on file.    Meme House MD  [facility]    Subjective   Jessie is a 45 year old who presents for the following health issues     HPI     USED TO SEE CINDY MARTINEZ, has no regular doctor,     PRETTY SPECIFIC REQUEST,     Having anxiety, related to pandemic, \"not only one\"    Specific for me, when flying, have always been nervous flier, nervous with turbulence,     Couple month ago we are fully vaccinated, went on a trip, had \"full blown panic attack\", despite normal turbulence, \"freaked out unlike anything she has experienced, \"    Lived through pandemic, \" symptoms, was so bad, was supposed to go to [facility], wedding postponed couldn't do it,\"  Could not go over ocean, , and step daughter went to [facility].    Supposed to go to [place], meeting a friend, an old college firend, anxiety flying over mountains,   Friend of her gave her one ativan, she gave her one dose,     Flew to [place],  Took one pill and \"worked beautiful, no anxiety, circled [place], with thunderstorms , diverted to [place], plane was running out of fuel.\"  No symptoms or panic attacks during the flight          Review of Systems " "  Constitutional, HEENT, cardiovascular, pulmonary, gi and gu systems are negative, except as otherwise noted.      Objective    Vitals - Patient Reported  Weight (Patient Reported): 46.3 kg (102 lb)  Height (Patient Reported): 156.2 cm (5' 1.5\")  BMI (Based on Pt Reported Ht/Wt): 18.96      Vitals:  No vitals were obtained today due to virtual visit.    Physical Exam   healthy, alert and no distress  PSYCH: Alert and oriented times 3; coherent speech, normal   rate and volume, able to articulate logical thoughts, able   to abstract reason, no tangential thoughts, no hallucinations   or delusions  Her affect is normal  RESP: No cough, no audible wheezing, able to talk in full sentences  Remainder of exam unable to be completed due to telephone visits    Office Visit on 09/21/2020   Component Date Value Ref Range Status     PAP 09/21/2020 NIL   Final     Copath Report 09/21/2020    Final                    Value:  Patient Name: CHASE HONG  MR#: [id number]  Specimen #: [id number]  Collected: 9/21/2020  Received: 9/24/2020  Reported: 9/26/2020 11:52  Ordering Phy(s): CINDY MARTINEZ    For improved result formatting, select 'View Enhanced Report Format' under   Linked Documents section.    SPECIMEN/STAIN PROCESS:  Pap imaged thin layer prep screening (Surepath, FocalPoint with guided   screening)       Pap-Cyto x 1, HPV ordered x 1    SOURCE: Cervical, endocervical  ----------------------------------------------------------------   Pap imaged thin layer prep screening (Surepath, FocalPoint with guided   screening)  SPECIMEN ADEQUACY:  Satisfactory for evaluation.  -Transformation zone component present.    CYTOLOGIC INTERPRETATION:    Negative for intraepithelial lesion or malignancy    Electronically signed out by:  JAVAN Yen  (ASCP)    CLINICAL HISTORY:  LMP: 8/26/20  Previous ASC-US  Date of Last Pap: 7/22/19,    Papanicolaou Test Limitations:  Cervical cytology is a sc                         "  reening test with   limited sensitivity; regular  screening is critical for cancer prevention; Pap tests are primarily   effective for the diagnosis/prevention of  squamous cell carcinoma, not adenocarcinomas or other cancers.    COLLECTION SITE:  Client:  [facility]  Location: [facility] ([facility])    The technical component of this testing was completed at the [facility], with the professional component performed   at the [facility], [address] [place] [address],   [place] [address] ([phone])         HPV Source 09/21/2020 SurePath   Final     HPV 16 DNA 09/21/2020 Negative  NEG^Negative Final     HPV 18 DNA 09/21/2020 Negative  NEG^Negative Final     Other HR HPV 09/21/2020 Positive* NEG^Negative Final     Final Diagnosis 09/21/2020    Final                    Value:This patient's sample is positive for other HR HPV DNA (types 31, 33, 35, 39, 45, 51, 52,   56, 58, 59, 66 or 68), not HPV 16 or HPV 18 DNA. This result requires clinical correlation   with concurrent cytology findings.      Comment: This test was developed and its performance characteristics determined by the   [facility], [facility]. It   has not been cleared or approved by the FDA. The laboratory is regulated under   CLIA as qualified to perform high-complexity testing. This test is used for   clinical purposes. It should not be regarded as investigational or for   research.  (Note)  METHODOLOGY:  The Roche darin 4800 system uses automated extraction,   simultaneous amplification of HPV (L1 region) and beta-globin,    followed by  real time detection of fluorescent labeled HPV and beta   globin using specific oligonucleotide probes . The test specifically   identifies types HPV 16 DNA and HPV 18 DNA while concurrently   detecting the rest of the high risk  types (31, 33, 35, 39, 45, 51,   52, 56, 58, 59, 66 or 68).  COMMENTS:  This test is not intended for use as a screening device   for women under age 30 with normal cervical cytology.  Results should   be correl                           ated with cytologic and histologic findings. Close clinical   followup is recommended.       Specimen Description 09/21/2020 Cervical Cells   Final     Specimen Descrip 09/21/2020 Vagina   Final     N Gonorrhea PCR 09/21/2020 Negative  NEG^Negative Final    Comment: Negative for N. gonorrhoeae rRNA by transcription mediated amplification.  A negative result by transcription mediated amplification does not preclude   the presence of N. gonorrhoeae infection because results are dependent on   proper and adequate collection, absence of inhibitors, and sufficient rRNA to   be detected.       Specimen Description 09/21/2020 Vagina   Final     Chlamydia Trachomatis PCR 09/21/2020 Negative  NEG^Negative Final    Comment: Negative for C. trachomatis rRNA by transcription mediated amplification.  A negative result by transcription mediated amplification does not preclude   the presence of C. trachomatis infection because results are dependent on   proper and adequate collection, absence of inhibitors, and sufficient rRNA to   be detected.       Cholesterol 09/21/2020 206* <200 mg/dL Final    Desirable:       <200 mg/dl     Triglycerides 09/21/2020 58  <150 mg/dL Final    Fasting specimen     HDL Cholesterol 09/21/2020 84  >49 mg/dL Final     LDL Cholesterol Calculated 09/21/2020 110* <100 mg/dL Final    Comment: Above desirable:  100-129 mg/dl  Borderline High:  130-159 mg/dL  High:             160-189 mg/dL  Very high:       >189 mg/dl       Non HDL Cholesterol 09/21/2020 122  <130 mg/dL Final     Sodium 09/21/2020 138  133 - 144 mmol/L Final     Potassium 09/21/2020 3.8  3.4 - 5.3 mmol/L Final     Chloride 09/21/2020 105  94 - 109 mmol/L Final     Carbon Dioxide 09/21/2020 30  20 - 32  mmol/L Final     Anion Gap 09/21/2020 3  3 - 14 mmol/L Final     Glucose 09/21/2020 93  70 - 99 mg/dL Final    Fasting specimen     Urea Nitrogen 09/21/2020 14  7 - 30 mg/dL Final     Creatinine 09/21/2020 0.56  0.52 - 1.04 mg/dL Final     GFR Estimate 09/21/2020 >90  >60 mL/min/[1.73_m2] Final    Comment: Non  GFR Calc  Starting 12/18/2018, serum creatinine based estimated GFR (eGFR) will be   calculated using the Chronic Kidney Disease Epidemiology Collaboration   (CKD-EPI) equation.       GFR Estimate If Black 09/21/2020 >90  >60 mL/min/[1.73_m2] Final    Comment:  GFR Calc  Starting 12/18/2018, serum creatinine based estimated GFR (eGFR) will be   calculated using the Chronic Kidney Disease Epidemiology Collaboration   (CKD-EPI) equation.       Calcium 09/21/2020 9.4  8.5 - 10.1 mg/dL Final     Bilirubin Total 09/21/2020 0.6  0.2 - 1.3 mg/dL Final     Albumin 09/21/2020 4.2  3.4 - 5.0 g/dL Final     Protein Total 09/21/2020 7.5  6.8 - 8.8 g/dL Final     Alkaline Phosphatase 09/21/2020 44  40 - 150 U/L Final     ALT 09/21/2020 34  0 - 50 U/L Final     AST 09/21/2020 14  0 - 45 U/L Final               Phone call duration: 11 minutes

## 2021-09-05 ENCOUNTER — HEALTH MAINTENANCE LETTER (OUTPATIENT)
Age: 46
End: 2021-09-05

## 2021-09-22 NOTE — PROGRESS NOTES
Jessie is a 45 year old  female who presents for annual exam.     Besides routine health maintenance, she would like to discuss recurring yeast infections around the time of her menses.    HPI:  The patient's PCP is none.  patient here today for her annual GYN exam and Pap smear.  She had her mammogram in January.  She accepts full STD testing but has no current symptoms or known exposure.  She her main concern is recurrent yeast infections.  She has a family history of diabetes in both of her parents but does not recall being tested in the past.    She is perimenopausal and is starting to have irregular cycles.      GYNECOLOGIC HISTORY:    Patient's last menstrual period was 09/10/2021.    Regular menses? no  Menses every 28-40 days.  Length of menses: 4-10 days    Her current contraception method is: none.  She  reports that she has never smoked. She has never used smokeless tobacco.    Patient is sexually active.  STD testing offered?  Accepted     Last PHQ-9 score on record =   PHQ-9 SCORE 2021   PHQ-9 Total Score 0     Last GAD7 score on record =   NICOLASA-7 SCORE 2021   Total Score 0     Alcohol Score = 3    HEALTH MAINTENANCE:  Cholesterol:   Recent Labs   Lab Test 20  0910 19  1030 03/13/15  0825   CHOL 206* 178 144   HDL 84 80 66   * 73 54   TRIG 58 125 119   CHOLHDLRATIO  --   --  2.2    < > = values in this interval not displayed.     TSH   Date Value Ref Range Status   2011 1.25 0.4 - 5.0 mU/L Final     Last Mammo: 21, Result: Normal, Next Mammo: 2022   Pap:   Lab Results   Component Value Date    PAP NIL POS-HPV 2020    PAP ASC-US 2019    PAP LSIL 2018      Colonoscopy: N/A, Next Colonoscopy: Due at age 46.  Dexa:  N/A    Health maintenance updated:  yes    HISTORY:  OB History    Para Term  AB Living   0 0 0 0 0 0   SAB TAB Ectopic Multiple Live Births   0 0 0 0 0       Patient Active Problem List   Diagnosis     Other acne      Encounter for other general counseling or advice on contraception     Allergic     CARDIOVASCULAR SCREENING; LDL GOAL LESS THAN 160     Environmental allergies     Health Care Home     Tree nut allergy     Encounter for surveillance of contraceptive pills     ASCUS with positive high risk HPV cervical     Cervical high risk HPV (human papillomavirus) test positive     Past Surgical History:   Procedure Laterality Date     BIOPSY  April 2013    moles removed by [facility] and biopsied; benign     ZZC NONSPECIFIC PROCEDURE      wisdom teeth removed      Social History     Tobacco Use     Smoking status: Never Smoker     Smokeless tobacco: Never Used   Substance Use Topics     Alcohol use: Yes     Comment: occasionaly      Problem (# of Occurrences) Relation (Name,Age of Onset)    Breast Cancer (1) Mother (April): Diagnosed Aug 2012; double mast + chem; remiss.    C.A.D. (1) Maternal Grandmother    Cerebrovascular Disease (1) Maternal Grandmother    Diabetes (3) Father (Minesh): Type II, Mother (April): Type II, Maternal Uncle    Heart Failure (1) Mother (April)       Negative family history of: Hypertension, Prostate Cancer, Cancer - colorectal            Current Outpatient Medications   Medication Sig     Ascorbic Acid (VITAMIN C PO) Take by mouth daily     Cholecalciferol (VITAMIN D PO) Take 1,000 Units by mouth daily     CRANBERRY PO Take by mouth daily     EPINEPHrine (EPIPEN/ADRENACLICK/OR ANY BX GENERIC EQUIV) 0.3 MG/0.3ML injection 2-pack Inject 0.3 mLs (0.3 mg) into the muscle as needed for anaphylaxis     ferrous sulfate (FEROSUL) 325 (65 Fe) MG tablet Take 325 mg by mouth daily (with breakfast)     fluconazole (DIFLUCAN) 150 MG tablet Take 1 tablet (150 mg) by mouth every 3 days     lactobacillus rhamnosus, GG, (CULTURELL) capsule Take 1 capsule by mouth 2 times daily     LORazepam (ATIVAN) 0.5 MG tablet 1-2 tablets once daily as needed for anxiety     ZYRTEC 10 MG OR TABS 1 TABLET DAILY     No current  "facility-administered medications for this visit.     Allergies   Allergen Reactions     Latex      Quinolones      Tree Nuts [Nuts]        Past medical, surgical, social and family histories were reviewed and updated in EPIC.    ROS:   12 point review of systems negative other than symptoms noted below or in the HPI.  Genitourinary: Vaginal Discharge and Vaginal Itching  No urinary frequency or dysuria, bladder or kidney problems, POSITIVE for:, irregular menses, vaginal itching or burning    EXAM:  /70   Ht 1.562 m (5' 1.5\")   Wt 47.2 kg (104 lb)   LMP 09/10/2021   BMI 19.33 kg/m     BMI: Body mass index is 19.33 kg/m .    PHYSICAL EXAM:  Constitutional:   Appearance: Well nourished, well developed, alert, in no acute distress  Neck:  Lymph Nodes:  No lymphadenopathy present    Thyroid:  Gland size normal, nontender, no nodules or masses present  on palpation  Chest:  Respiratory Effort:  Breathing unlabored  Cardiovascular:    Heart: Auscultation:  Regular rate, normal rhythm, no murmurs present  Breasts: Inspection of Breasts:  No lymphadenopathy present., Palpation of Breasts and Axillae:  No masses present on palpation, no breast tenderness., Axillary Lymph Nodes:  No lymphadenopathy present. and No nodularity, asymmetry or nipple discharge bilaterally.  Gastrointestinal:   Abdominal Examination:  Abdomen nontender to palpation, tone normal without rigidity or guarding, no masses present, umbilicus without lesions   Liver and Spleen:  No hepatomegaly present, liver nontender to palpation    Hernias:  No hernias present  Lymphatic: Lymph Nodes:  No other lymphadenopathy present  Skin:  General Inspection:  No rashes present, no lesions present, no areas of  discoloration  Neurologic:    Mental Status:  Oriented X3.  Normal strength and tone, sensory exam                grossly normal, mentation intact and speech normal.    Psychiatric:   Mentation appears normal and affect " normal/bright.         Pelvic Exam:  External Genitalia:     Normal appearance for age, no discharge present, no tenderness present, no inflammatory lesions present, color normal  Vagina:     Normal vaginal vault without central or paravaginal defects, no discharge present, no inflammatory lesions present, no masses present  Bladder:     Nontender to palpation  Urethra:   Urethral Body:  Urethra palpation normal, urethra structural support normal   Urethral Meatus:  No erythema or lesions present  Cervix:     Appearance healthy, no lesions present, nontender to palpation, no bleeding present  Uterus:     Uterus: firm, normal sized and nontender, anteverted in position.   Adnexa:     No adnexal tenderness present, no adnexal masses present  Perineum:     Perineum within normal limits, no evidence of trauma, no rashes or skin lesions present  Anus:     Anus within normal limits, no hemorrhoids present  Inguinal Lymph Nodes:     No lymphadenopathy present  Pubic Hair:     Normal pubic hair distribution for age  Genitalia and Groin:     No rashes present, no lesions present, no areas of discoloration, no masses present      COUNSELING:   Special attention given to:        Regular exercise       Healthy diet/nutrition       (Ladonna)menopause management    BMI: Body mass index is 19.33 kg/m .      ASSESSMENT:  45 year old female with satisfactory annual exam.    ICD-10-CM    1. Encounter for gynecological examination without abnormal finding  Z01.419    2. Cervical high risk HPV (human papillomavirus) test positive  R87.810 Pap screen with HPV - recommended age 30 - 65 years   3. Screen for STD (sexually transmitted disease)  Z11.3 NEISSERIA GONORRHOEA PCR     CHLAMYDIA TRACHOMATIS PCR     Treponema Abs w Reflex to RPR and Titer     Herpes Simplex Virus 1 and 2 IgG     HIV Antigen Antibody Combo     Hepatitis C Antibody     Treponema Abs w Reflex to RPR and Titer     Herpes Simplex Virus 1 and 2 IgG     HIV Antigen Antibody  Combo     Hepatitis C Antibody   4. Screening for diabetes mellitus  Z13.1 Hemoglobin A1c     Hemoglobin A1c   5. Vaginal irritation  N89.8 Bacterial Vaginosis Smear     Yeast culture     Group B strep PCR     fluconazole (DIFLUCAN) 150 MG tablet       PLAN:  45-year-old female with a normal GYN exam today.  Vaginal cultures will be sent and we will screen for STD and diabetes.  Her Pap smear was updated and if it is normal I like her to repeat in 1 year.  We had extensive discussion about yeast infection prevention.    GEORGES Patel CNP

## 2021-09-23 ENCOUNTER — OFFICE VISIT (OUTPATIENT)
Dept: OBGYN | Facility: CLINIC | Age: 46
End: 2021-09-23
Payer: COMMERCIAL

## 2021-09-23 VITALS
DIASTOLIC BLOOD PRESSURE: 70 MMHG | BODY MASS INDEX: 19.14 KG/M2 | SYSTOLIC BLOOD PRESSURE: 106 MMHG | WEIGHT: 104 LBS | HEIGHT: 62 IN

## 2021-09-23 DIAGNOSIS — Z01.419 ENCOUNTER FOR GYNECOLOGICAL EXAMINATION WITHOUT ABNORMAL FINDING: Primary | ICD-10-CM

## 2021-09-23 DIAGNOSIS — Z11.3 SCREEN FOR STD (SEXUALLY TRANSMITTED DISEASE): ICD-10-CM

## 2021-09-23 DIAGNOSIS — Z23 NEED FOR PROPHYLACTIC VACCINATION AND INOCULATION AGAINST INFLUENZA: ICD-10-CM

## 2021-09-23 DIAGNOSIS — R87.810 CERVICAL HIGH RISK HPV (HUMAN PAPILLOMAVIRUS) TEST POSITIVE: ICD-10-CM

## 2021-09-23 DIAGNOSIS — Z13.1 SCREENING FOR DIABETES MELLITUS: ICD-10-CM

## 2021-09-23 DIAGNOSIS — N89.8 VAGINAL IRRITATION: ICD-10-CM

## 2021-09-23 LAB — HBA1C MFR BLD: 5.3 % (ref 0–5.6)

## 2021-09-23 PROCEDURE — 86803 HEPATITIS C AB TEST: CPT | Performed by: NURSE PRACTITIONER

## 2021-09-23 PROCEDURE — 83036 HEMOGLOBIN GLYCOSYLATED A1C: CPT | Performed by: NURSE PRACTITIONER

## 2021-09-23 PROCEDURE — G0145 SCR C/V CYTO,THINLAYER,RESCR: HCPCS | Performed by: NURSE PRACTITIONER

## 2021-09-23 PROCEDURE — 87491 CHLMYD TRACH DNA AMP PROBE: CPT | Performed by: NURSE PRACTITIONER

## 2021-09-23 PROCEDURE — 86780 TREPONEMA PALLIDUM: CPT | Performed by: NURSE PRACTITIONER

## 2021-09-23 PROCEDURE — 90686 IIV4 VACC NO PRSV 0.5 ML IM: CPT | Performed by: NURSE PRACTITIONER

## 2021-09-23 PROCEDURE — 87591 N.GONORRHOEAE DNA AMP PROB: CPT | Performed by: NURSE PRACTITIONER

## 2021-09-23 PROCEDURE — 99213 OFFICE O/P EST LOW 20 MIN: CPT | Mod: 25 | Performed by: NURSE PRACTITIONER

## 2021-09-23 PROCEDURE — 90471 IMMUNIZATION ADMIN: CPT | Performed by: NURSE PRACTITIONER

## 2021-09-23 PROCEDURE — 36415 COLL VENOUS BLD VENIPUNCTURE: CPT | Performed by: NURSE PRACTITIONER

## 2021-09-23 PROCEDURE — 87205 SMEAR GRAM STAIN: CPT | Performed by: NURSE PRACTITIONER

## 2021-09-23 PROCEDURE — 87624 HPV HI-RISK TYP POOLED RSLT: CPT | Performed by: NURSE PRACTITIONER

## 2021-09-23 PROCEDURE — 87653 STREP B DNA AMP PROBE: CPT | Performed by: NURSE PRACTITIONER

## 2021-09-23 PROCEDURE — 87106 FUNGI IDENTIFICATION YEAST: CPT | Performed by: NURSE PRACTITIONER

## 2021-09-23 PROCEDURE — 86695 HERPES SIMPLEX TYPE 1 TEST: CPT | Performed by: NURSE PRACTITIONER

## 2021-09-23 PROCEDURE — 87389 HIV-1 AG W/HIV-1&-2 AB AG IA: CPT | Performed by: NURSE PRACTITIONER

## 2021-09-23 PROCEDURE — 99396 PREV VISIT EST AGE 40-64: CPT | Mod: 25 | Performed by: NURSE PRACTITIONER

## 2021-09-23 PROCEDURE — 87102 FUNGUS ISOLATION CULTURE: CPT | Performed by: NURSE PRACTITIONER

## 2021-09-23 PROCEDURE — 86696 HERPES SIMPLEX TYPE 2 TEST: CPT | Performed by: NURSE PRACTITIONER

## 2021-09-23 RX ORDER — FLUCONAZOLE 150 MG/1
150 TABLET ORAL
Qty: 4 TABLET | Refills: 0 | Status: SHIPPED | OUTPATIENT
Start: 2021-09-23 | End: 2022-01-18

## 2021-09-23 ASSESSMENT — PATIENT HEALTH QUESTIONNAIRE - PHQ9
5. POOR APPETITE OR OVEREATING: NOT AT ALL
SUM OF ALL RESPONSES TO PHQ QUESTIONS 1-9: 0

## 2021-09-23 ASSESSMENT — MIFFLIN-ST. JEOR: SCORE: 1067.05

## 2021-09-23 ASSESSMENT — ANXIETY QUESTIONNAIRES
6. BECOMING EASILY ANNOYED OR IRRITABLE: NOT AT ALL
5. BEING SO RESTLESS THAT IT IS HARD TO SIT STILL: NOT AT ALL
GAD7 TOTAL SCORE: 0
3. WORRYING TOO MUCH ABOUT DIFFERENT THINGS: NOT AT ALL
7. FEELING AFRAID AS IF SOMETHING AWFUL MIGHT HAPPEN: NOT AT ALL
2. NOT BEING ABLE TO STOP OR CONTROL WORRYING: NOT AT ALL
1. FEELING NERVOUS, ANXIOUS, OR ON EDGE: NOT AT ALL

## 2021-09-24 LAB
BACTERIAL VAGINOSIS SMEAR: NORMAL
HCV AB SERPL QL IA: NONREACTIVE
HIV 1+2 AB+HIV1 P24 AG SERPL QL IA: NONREACTIVE
HSV1 IGG SERPL QL IA: 16.2 INDEX
HSV1 IGG SERPL QL IA: ABNORMAL
HSV2 IGG SERPL QL IA: 0.12 INDEX
HSV2 IGG SERPL QL IA: ABNORMAL
NUGENT SCORE: 1
T PALLIDUM AB SER QL: NONREACTIVE
WHITE BLOOD CELLS: NORMAL

## 2021-09-24 ASSESSMENT — ANXIETY QUESTIONNAIRES: GAD7 TOTAL SCORE: 0

## 2021-09-25 LAB
GP B STREP DNA SPEC QL NAA+PROBE: NEGATIVE
PATIENT PENICILLIN, AMOXICILLIN, CEPHALOSPORINS ALLERGY: NORMAL

## 2021-09-26 LAB
BACTERIA SPEC CULT: ABNORMAL
C TRACH DNA SPEC QL NAA+PROBE: NEGATIVE
N GONORRHOEA DNA SPEC QL NAA+PROBE: NEGATIVE

## 2021-09-28 LAB
BKR LAB AP GYN ADEQUACY: NORMAL
BKR LAB AP GYN INTERPRETATION: NORMAL
BKR LAB AP HPV REFLEX: NORMAL
BKR LAB AP PREVIOUS ABNL DX: NORMAL
BKR LAB AP PREVIOUS ABNORMAL: NORMAL
PATH REPORT.COMMENTS IMP SPEC: NORMAL
PATH REPORT.RELEVANT HX SPEC: NORMAL

## 2021-09-30 ENCOUNTER — PATIENT OUTREACH (OUTPATIENT)
Dept: OBGYN | Facility: CLINIC | Age: 46
End: 2021-09-30

## 2021-09-30 LAB
HUMAN PAPILLOMA VIRUS 16 DNA: NEGATIVE
HUMAN PAPILLOMA VIRUS 18 DNA: NEGATIVE
HUMAN PAPILLOMA VIRUS FINAL DIAGNOSIS: NORMAL
HUMAN PAPILLOMA VIRUS OTHER HR: NEGATIVE

## 2022-01-14 ENCOUNTER — HOSPITAL ENCOUNTER (OUTPATIENT)
Dept: MAMMOGRAPHY | Facility: CLINIC | Age: 47
Discharge: HOME | End: 2022-01-14
Attending: NURSE PRACTITIONER | Admitting: NURSE PRACTITIONER
Payer: COMMERCIAL

## 2022-01-14 DIAGNOSIS — Z12.31 VISIT FOR SCREENING MAMMOGRAM: ICD-10-CM

## 2022-01-14 PROCEDURE — 77067 SCR MAMMO BI INCL CAD: CPT

## 2022-01-18 ENCOUNTER — OFFICE VISIT (OUTPATIENT)
Dept: FAMILY MEDICINE | Facility: CLINIC | Age: 47
End: 2022-01-18
Payer: COMMERCIAL

## 2022-01-18 VITALS
DIASTOLIC BLOOD PRESSURE: 65 MMHG | HEIGHT: 62 IN | HEART RATE: 76 BPM | BODY MASS INDEX: 19.32 KG/M2 | TEMPERATURE: 97.8 F | RESPIRATION RATE: 18 BRPM | SYSTOLIC BLOOD PRESSURE: 104 MMHG | WEIGHT: 105 LBS | OXYGEN SATURATION: 98 %

## 2022-01-18 DIAGNOSIS — L71.0 PERIORAL DERMATITIS: ICD-10-CM

## 2022-01-18 DIAGNOSIS — Z12.11 COLON CANCER SCREENING: ICD-10-CM

## 2022-01-18 DIAGNOSIS — F41.0 PANIC ATTACK: ICD-10-CM

## 2022-01-18 DIAGNOSIS — Z91.018 TREE NUT ALLERGY: Primary | ICD-10-CM

## 2022-01-18 DIAGNOSIS — N89.8 VAGINAL IRRITATION: ICD-10-CM

## 2022-01-18 PROCEDURE — 99214 OFFICE O/P EST MOD 30 MIN: CPT | Performed by: PHYSICIAN ASSISTANT

## 2022-01-18 RX ORDER — FLUCONAZOLE 150 MG/1
150 TABLET ORAL ONCE
Qty: 4 TABLET | Refills: 1 | Status: SHIPPED | OUTPATIENT
Start: 2022-01-18 | End: 2022-11-09

## 2022-01-18 RX ORDER — DOXYCYCLINE HYCLATE 20 MG
20 TABLET ORAL 2 TIMES DAILY
Qty: 60 TABLET | Refills: 1 | Status: SHIPPED | OUTPATIENT
Start: 2022-01-18 | End: 2022-02-15

## 2022-01-18 RX ORDER — EPINEPHRINE 0.3 MG/.3ML
0.3 INJECTION SUBCUTANEOUS ONCE
Qty: 0.6 ML | Refills: 1 | Status: SHIPPED | OUTPATIENT
Start: 2022-01-18 | End: 2022-01-18

## 2022-01-18 RX ORDER — LORAZEPAM 0.5 MG/1
TABLET ORAL
Qty: 12 TABLET | Refills: 0 | Status: SHIPPED | OUTPATIENT
Start: 2022-01-18 | End: 2022-08-08

## 2022-01-18 ASSESSMENT — MIFFLIN-ST. JEOR: SCORE: 1066.59

## 2022-01-18 ASSESSMENT — PAIN SCALES - GENERAL: PAINLEVEL: NO PAIN (0)

## 2022-01-18 NOTE — PROGRESS NOTES
HPI: Jessie is a 47 yo female here to get established.  She needs medication refills.    She has a tree nut allergy and due for new epi pen  Pt has anxiety while flying and would like lorazepam refilled.     but lives separately the past 2 years  Has step dtr  Works marketing remotely    She is having perimenopausal sxs the past few years  She has some acne (few papules on chin)  She stopped her birth control pills May 2020.  Using condoms or natural family planning for birth control    She feels like she has a chronic intermittent yeast infections  They resolve with her period, then can recur  If she avoids sugar these are better  She is using a boric acid suppository which helps.  She has had inflammation of her gums which she attributes to yeast  She does get relief with Diflucan that she takes a few times per year.    Requests colonoscopy  No FH of colon polyps or colon ca    Past Medical History:   Diagnosis Date     Abnormal Pap smear of cervix 2016, 2017, 2018, 2019    see problem list     Allergic 08/20/2008     Anal fissure      Cervical high risk HPV (human papillomavirus) test positive 2016, 2018, 2019    See Problem List     H/O colposcopy with cervical biopsy 05/17/2017 05/17/17: [place] [facility] normal.      Personal history of other genital system and obstetric disorders(V13.29)      Past Surgical History:   Procedure Laterality Date     BIOPSY  April 2013    moles removed by [facility] and biopsied; benign     ZC NONSPECIFIC PROCEDURE      wisdom teeth removed     Social History     Tobacco Use     Smoking status: Never Smoker     Smokeless tobacco: Never Used   Substance Use Topics     Alcohol use: Yes     Comment: occasionaly     Current Outpatient Medications   Medication Sig Dispense Refill     Ascorbic Acid (VITAMIN C PO) Take by mouth daily       Cholecalciferol (VITAMIN D PO) Take 1,000 Units by mouth daily       CRANBERRY PO Take by mouth daily       doxycycline hyclate (PERIOSTAT) 20 MG tablet  "Take 1 tablet (20 mg) by mouth 2 times daily 60 tablet 1     ferrous sulfate (FEROSUL) 325 (65 Fe) MG tablet Take 325 mg by mouth daily (with breakfast)       lactobacillus rhamnosus, GG, (CULTURELL) capsule Take 1 capsule by mouth 2 times daily       LORazepam (ATIVAN) 0.5 MG tablet 1-2 tablets once daily as needed for anxiety 12 tablet 0     ZYRTEC 10 MG OR TABS 1 TABLET DAILY 30 0     Allergies   Allergen Reactions     Latex      Quinolones      Tree Nuts [Nuts]      FAMILY HISTORY NOTED AND REVIEWED      PHYSICAL EXAM:    /65 (BP Location: Left arm, Patient Position: Chair, Cuff Size: Adult Regular)   Pulse 76   Temp 97.8  F (36.6  C) (Temporal)   Resp 18   Ht 1.562 m (5' 1.5\")   Wt 47.6 kg (105 lb)   LMP 01/08/2021   SpO2 98%   BMI 19.52 kg/m      Patient appears non toxic  Psych: approp affect and mood    Assessment and Plan:     (Z91.018) Tree nut allergy  (primary encounter diagnosis)  Comment:   Plan: EPINEPHrine (ANY BX GENERIC EQUIV) 0.3 MG/0.3ML        injection 2-pack            (N89.8) Vaginal irritation  Comment:   Plan: fluconazole (DIFLUCAN) 150 MG tablet        Refilled. She takes a few times per year.    (F41.0) Panic attack  Comment: warned this can be habit forming. She uses sparingly mostly for travel.  Plan: LORazepam (ATIVAN) 0.5 MG tablet            (Z12.11) Colon cancer screening  Comment: ordered screening colonoscopy but she should check her insurance first to make sure covered at her age  Plan: Adult Gastro Ref - Procedure Only            (L71.0) Perioral dermatitis  Comment:   Plan: doxycycline hyclate (PERIOSTAT) 20 MG tablet        bid      Caron Armijo PA-C        "

## 2022-02-11 DIAGNOSIS — L71.0 PERIORAL DERMATITIS: ICD-10-CM

## 2022-02-15 RX ORDER — DOXYCYCLINE HYCLATE 20 MG
TABLET ORAL
Qty: 180 TABLET | Refills: 1 | Status: SHIPPED | OUTPATIENT
Start: 2022-02-15 | End: 2022-08-08

## 2022-03-07 ENCOUNTER — TELEPHONE (OUTPATIENT)
Dept: GASTROENTEROLOGY | Facility: CLINIC | Age: 47
End: 2022-03-07

## 2022-03-07 NOTE — TELEPHONE ENCOUNTER
Screening Questions  BlueKIND OF PREP RedLOCATION [review exclusion criteria] GreenSEDATION TYPE    1. Have you had a positive covid test in the last 90 days? N     2. Are you active on mychart? Y    3. What insurance is in the chart? [facility]     3.  Ordering/Referring Provider: Caron Armijo PA-C in  FAMILY PRAC/IM    4. BMI 19.8 [BMI OVER 40-EXTENDED PREP]  If greater than 40 review exclusion criteria [PAC APPT IF @ [facility]]    5.  Respiratory Screening :  [If yes to any of the following HOSPITAL setting only]     Do you use daily home oxygen? N    Do you have mod to severe Obstructive Sleep Apnea? N  [OKAY @ [facility] SH PH RI]   Do you have Pulmonary Hypertension? N     Do you have UNCONTROLLED asthma? N      6. Have you had a heart or lung transplant? N      7. Are you currently on dialysis? N [ If yes, G-PREP & HOSPITAL setting only]     8. Do you have chronic kidney disease? N [ If yes, G-PREP ]    9. Have you had a stroke or Transient ischemic attack (TIA) within 6 months? N (If yes, do not schedule at [facility])    10. In the past 6 months, have you had any heart related issues including cardiomyopathy or heart attack? N        If yes, did it require cardiac stenting or other implantable device? N      11. Do you have any implantable devices in your body (pacemaker, defib, LVAD)? N (If yes, schedule at [facility])    12. Do you take nitroglycerin? N   If yes, how often?    (if yes, HOSPITAL setting ONLY)    13. Are you currently taking any blood thinners? N   [IF YES, INFORM PATIENT TO FOLLOW UP W/ ORDERING PROVIDER FOR BRIDGING INSTRUCTIONS]     14. Are you a diabetic? N   [ If yes, G-PREP ]    15. [FEMALES] Are you currently pregnant?     If yes, how many weeks?     16. Are you taking any prescription pain medications on a routine schedule?  N  [ If yes, EXTENDED PREP.] [If yes, MAC]    17. Do you have any chemical dependencies such as alcohol, street drugs, or methadone?  N [If yes, MAC]    18. Do  you have any history of post-traumatic stress syndrome, severe anxiety or history of psychosis?  N  [If yes, MAC]    19. Do you transfer independently?  Y    20.  Do you have any issues with constipation?  N  [ If yes, EXTENDED PREP.]    21. Preferred LOCAL Pharmacy for Pre Prescription  [id number] [place] [address] AT ACROSS FROM [address]  Scheduling Details      Caller : alexandria  (Please ask for phone number if not scheduled by patient)    Type of Procedure Scheduled: Colonoscopy  Which Colonoscopy Prep was Sent?: Miralax  [facility] CF PATIENTS & [facility]S PATIENTS NEEDS EXTENDED PREP  Surgeon: Madi per pt request  Date of Procedure: 4/18  Location:       Sedation Type: CS  Conscious Sedation- Needs  for 6 hours after the procedure  MAC/General-Needs  for 24 hours after procedure    Pre-op Required at [facility] [place], [place] and OR for MAC sedation: n  (advise patient they will need a pre-op prior to procedure -)      Informed patient they will need an adult  Y  Cannot take any type of public or medical transportation alone    Pre-Procedure Covid test to be completed [facility] or Externally: [place] 4/15    Confirmed Nurse will call to complete assessment Y    Additional comments: none

## 2022-03-08 DIAGNOSIS — Z11.59 ENCOUNTER FOR SCREENING FOR OTHER VIRAL DISEASES: Primary | ICD-10-CM

## 2022-04-06 ENCOUNTER — TELEPHONE (OUTPATIENT)
Dept: GASTROENTEROLOGY | Facility: CLINIC | Age: 47
End: 2022-04-06

## 2022-04-06 NOTE — TELEPHONE ENCOUNTER
Caller: Jessie Karimi      Procedure: colonosocpy     Date, Location, and Surgeon of Procedure Cancelled: 04/18/2022, [facility]     Ordering Provider: Caron Armijo     Reason for cancel (please be detailed, any staff messages or encounters to note?):     Per pt -- did not want to PREP on Easter Sunday.         Rescheduled: yes      If rescheduled:    Date: 05/09/2022   Location:  [facility]    Note any change or update to original order/sedation: n/a

## 2022-04-27 ENCOUNTER — TELEPHONE (OUTPATIENT)
Dept: GASTROENTEROLOGY | Facility: CLINIC | Age: 47
End: 2022-04-27

## 2022-04-27 NOTE — TELEPHONE ENCOUNTER
Caller: Jessie    Procedure: Colon    Date, Location, and Surgeon of Procedure Cancelled: 5/9/22  [facility]    Ordering Provider:Zofia    Reason for cancel (please be detailed, any staff messages or encounters to note?): Patient        Rescheduled: Yes     If rescheduled:    Date: 6/13/22   Location:    Prep Resent: No changes(changes to prep?)   Covid Test Rescheduled: Yes    Note any change or update to original order/sedation: None

## 2022-06-09 ENCOUNTER — LAB (OUTPATIENT)
Dept: LAB | Facility: CLINIC | Age: 47
End: 2022-06-09
Attending: COLON & RECTAL SURGERY
Payer: COMMERCIAL

## 2022-06-09 DIAGNOSIS — Z11.59 ENCOUNTER FOR SCREENING FOR OTHER VIRAL DISEASES: ICD-10-CM

## 2022-06-09 PROCEDURE — U0005 INFEC AGEN DETEC AMPLI PROBE: HCPCS

## 2022-06-09 PROCEDURE — U0003 INFECTIOUS AGENT DETECTION BY NUCLEIC ACID (DNA OR RNA); SEVERE ACUTE RESPIRATORY SYNDROME CORONAVIRUS 2 (SARS-COV-2) (CORONAVIRUS DISEASE [COVID-19]), AMPLIFIED PROBE TECHNIQUE, MAKING USE OF HIGH THROUGHPUT TECHNOLOGIES AS DESCRIBED BY CMS-2020-01-R: HCPCS

## 2022-06-11 LAB — SARS-COV-2 RNA RESP QL NAA+PROBE: NEGATIVE

## 2022-06-13 ENCOUNTER — HOSPITAL ENCOUNTER (OUTPATIENT)
Facility: CLINIC | Age: 47
Discharge: HOME | End: 2022-06-13
Attending: COLON & RECTAL SURGERY | Admitting: COLON & RECTAL SURGERY
Payer: COMMERCIAL

## 2022-06-13 VITALS
BODY MASS INDEX: 18.88 KG/M2 | DIASTOLIC BLOOD PRESSURE: 81 MMHG | OXYGEN SATURATION: 100 % | RESPIRATION RATE: 12 BRPM | HEART RATE: 75 BPM | HEIGHT: 61 IN | SYSTOLIC BLOOD PRESSURE: 100 MMHG | WEIGHT: 100 LBS

## 2022-06-13 LAB — COLONOSCOPY: NORMAL

## 2022-06-13 PROCEDURE — 45380 COLONOSCOPY AND BIOPSY: CPT | Mod: PT | Performed by: COLON & RECTAL SURGERY

## 2022-06-13 PROCEDURE — 88305 TISSUE EXAM BY PATHOLOGIST: CPT | Mod: TC | Performed by: COLON & RECTAL SURGERY

## 2022-06-13 PROCEDURE — G0500 MOD SEDAT ENDO SERVICE >5YRS: HCPCS | Performed by: COLON & RECTAL SURGERY

## 2022-06-13 PROCEDURE — 250N000011 HC RX IP 250 OP 636: Performed by: COLON & RECTAL SURGERY

## 2022-06-13 RX ORDER — LIDOCAINE 40 MG/G
CREAM TOPICAL
Status: DISCONTINUED | OUTPATIENT
Start: 2022-06-13 | End: 2022-06-13 | Stop reason: HOSPADM

## 2022-06-13 RX ORDER — FENTANYL CITRATE 50 UG/ML
INJECTION, SOLUTION INTRAMUSCULAR; INTRAVENOUS PRN
Status: COMPLETED | OUTPATIENT
Start: 2022-06-13 | End: 2022-06-13

## 2022-06-13 RX ORDER — ONDANSETRON 2 MG/ML
4 INJECTION INTRAMUSCULAR; INTRAVENOUS
Status: DISCONTINUED | OUTPATIENT
Start: 2022-06-13 | End: 2022-06-13 | Stop reason: HOSPADM

## 2022-06-13 RX ADMIN — MIDAZOLAM 2 MG: 1 INJECTION INTRAMUSCULAR; INTRAVENOUS at 10:17

## 2022-06-13 RX ADMIN — FENTANYL CITRATE 100 MCG: 50 INJECTION, SOLUTION INTRAMUSCULAR; INTRAVENOUS at 10:15

## 2022-06-13 NOTE — H&P
Colon & Rectal Surgery History and Physical  Pre-Endoscopy Procedure Note    History of Present Illness   I have been asked by Caron Armijo to evaluate this 46 year old female for colorectal cancer screening. She currently denies any abdominal pain, weight loss, bleeding per rectum, or recent change in bowel habits.    Past Medical History  Diagnosis Date     Abnormal Pap smear of cervix 2016, 2017, 2018, 2019     Allergic 08/20/2008     Anal fissure      Cervical high risk HPV (human papillomavirus) test positive 2016, 2018, 2019       Past Surgical History     Past Surgical History:   Procedure Laterality Date     BIOPSY SKIN LESION  April 2013     WISDOM TEETH EXTRACTION       Colposcopy with cervical biopsy 05/17/2017         Medications  Medication Sig     ZYRTEC 10 MG OR TABS 1 TABLET DAILY     Ascorbic Acid (VITAMIN C PO) Take by mouth daily     Cholecalciferol (VITAMIN D PO) Take 1,000 Units by mouth daily     CRANBERRY PO Take by mouth daily     doxycycline hyclate (PERIOSTAT) 20 MG tablet TAKE 1 TABLET BY MOUTH TWICE A DAY     ferrous sulfate (FEROSUL) 325 (65 Fe) MG tablet Take 325 mg by mouth daily (with breakfast)     lactobacillus rhamnosus, GG, (CULTURELL) capsule Take 1 capsule by mouth 2 times daily     LORazepam (ATIVAN) 0.5 MG tablet 1-2 tablets once daily as needed for anxiety       Allergies  Allergen Reactions     Tree Nuts [Nuts] Anaphylaxis     Latex      Quinolones         Family History   Family history includes Breast Cancer in her mother; C.A.D. in her maternal grandmother; Cerebrovascular Disease in her maternal grandmother; Diabetes in her father, maternal uncle, and mother; Heart Failure in her mother; Prostate Cancer in her father.     Social History   She reports that she has never smoked. She has never used smokeless tobacco. She reports current alcohol use. She reports that she does not use drugs.    Review of Systems   Constitutional:  No fever, weight change or fatigue.    Eyes:  "    No dry eyes or vision changes.   Ears/Nose/Throat/Neck:  No oral ulcers, sore throat or voice change.    Cardiovascular:   No palpitations, syncope, angina or edema.   Respiratory:    No chest pain, excessive sleepiness, shortness of breath or hemoptysis.    Gastrointestinal:   No abdominal pain, nausea, vomiting, diarrhea or heartburn.    Genitourinary:   No dysuria, hematuria, urinary retention or urinary frequency.   Musculoskeletal:  No joint swelling or arthralgias.    Dermatologic:  No skin rash or other skin changes.   Neurologic:    No focal weakness or numbness. No neuropathy.   Psychiatric:    No depression, anxiety, suicidal ideation, or paranoid ideation.   Endocrine:   No cold or heat intolerance, polydipsia, hirsutism, change in libido, or flushing.   Hematology/Lymphatic:  No bleeding or lymphadenopathy.    Allergy/Immunology:  No rhinitis or hives.     Physical Exam   Vitals:  /83, HR 97, RR 16, height 1.549 m (5' 1\"), weight 45.4 kg (100 lb), SpO2 100 %, not currently breastfeeding.    General:  Alert and oriented to person, place and time   Airway: Normal oropharyngeal airway and neck mobility   Lungs:  Clear bilaterally   Heart:  Regular rate and rhythm   Abdomen: Soft, NT, ND, no masses   Extremities: Warm, good capillary refill    ASA Grade: II (mild systemic disease)    Impression: Cleared for use of conscious sedation for colorectal cancer screening    Plan: Proceed with colonoscopy     Joie Barraza MD  [facility]  [phone]  "

## 2022-06-15 LAB
PATH REPORT.COMMENTS IMP SPEC: NORMAL
PATH REPORT.COMMENTS IMP SPEC: NORMAL
PATH REPORT.FINAL DX SPEC: NORMAL
PATH REPORT.GROSS SPEC: NORMAL
PATH REPORT.MICROSCOPIC SPEC OTHER STN: NORMAL
PATH REPORT.RELEVANT HX SPEC: NORMAL
PHOTO IMAGE: NORMAL

## 2022-06-15 PROCEDURE — 88305 TISSUE EXAM BY PATHOLOGIST: CPT | Mod: 26

## 2022-07-22 ENCOUNTER — E-VISIT (OUTPATIENT)
Dept: FAMILY MEDICINE | Facility: CLINIC | Age: 47
End: 2022-07-22
Payer: COMMERCIAL

## 2022-07-22 DIAGNOSIS — B37.31 YEAST INFECTION OF THE VAGINA: Primary | ICD-10-CM

## 2022-07-22 PROCEDURE — 99423 OL DIG E/M SVC 21+ MIN: CPT | Performed by: INTERNAL MEDICINE

## 2022-07-22 RX ORDER — FLUCONAZOLE 150 MG/1
150 TABLET ORAL
Qty: 3 TABLET | Refills: 1 | Status: SHIPPED | OUTPATIENT
Start: 2022-07-22 | End: 2022-07-29

## 2022-07-22 NOTE — TELEPHONE ENCOUNTER
Provider E-Visit time total (minutes): 25 minutes    Chief Complaint:     E-visit for yeast infection    HPI:   Patient Jessie Karimi is a very pleasant 46 year old female today for E-visit for yeast infection.       Current Medications:     Current Outpatient Medications   Medication Sig Dispense Refill     fluconazole (DIFLUCAN) 150 MG tablet Take 1 tablet (150 mg) by mouth every 3 days for 3 doses 3 tablet 1     Ascorbic Acid (VITAMIN C PO) Take by mouth daily       Cholecalciferol (VITAMIN D PO) Take 1,000 Units by mouth daily       CRANBERRY PO Take by mouth daily       doxycycline hyclate (PERIOSTAT) 20 MG tablet TAKE 1 TABLET BY MOUTH TWICE A  tablet 1     ferrous sulfate (FEROSUL) 325 (65 Fe) MG tablet Take 325 mg by mouth daily (with breakfast)       lactobacillus rhamnosus, GG, (CULTURELL) capsule Take 1 capsule by mouth 2 times daily       LORazepam (ATIVAN) 0.5 MG tablet 1-2 tablets once daily as needed for anxiety 12 tablet 0     ZYRTEC 10 MG OR TABS 1 TABLET DAILY 30 0         Allergies:      Allergies   Allergen Reactions     Tree Nuts [Nuts] Anaphylaxis     Latex      Quinolones             Past Medical History:     Past Medical History:   Diagnosis Date     Abnormal Pap smear of cervix 2016, 2017, 2018, 2019    see problem list     Allergic 08/20/2008     Anal fissure      Cervical high risk HPV (human papillomavirus) test positive 2016, 2018, 2019    See Problem List     H/O colposcopy with cervical biopsy 05/17/2017 05/17/17: [place] Bx normal.      Personal history of other genital system and obstetric disorders(V13.29)          Past Surgical History:     Past Surgical History:   Procedure Laterality Date     BIOPSY  April 2013    moles removed by Naz and biopsied; benign     COLONOSCOPY N/A 6/13/2022    Procedure: COLONOSCOPY, WITH POLYPECTOMY AND BIOPSY;  Surgeon: Joie Barraza MD;  Location: [facility] NONSPECIFIC PROCEDURE      wisdom teeth removed         Family  Medical History:     Family History   Problem Relation Age of Onset     Diabetes Mother         Type II     Breast Cancer Mother         Diagnosed Aug 2012; double mast + chem; remiss.     Heart Failure Mother      Diabetes Father         Type II     Prostate Cancer Father      C.A.D. Maternal Grandmother      Cerebrovascular Disease Maternal Grandmother      Diabetes Maternal Uncle      Hypertension No family hx of      Cancer - colorectal No family hx of          Social History:     Social History     Socioeconomic History     Marital status:      Spouse name: Jose     Number of children: 0     Years of education: Not on file     Highest education level: Not on file   Occupational History     Employer: [employer].   Tobacco Use     Smoking status: Never Smoker     Smokeless tobacco: Never Used   Substance and Sexual Activity     Alcohol use: Yes     Comment: occasionaly     Drug use: No     Sexual activity: Yes     Partners: Male   Other Topics Concern      Service No     Blood Transfusions No     Caffeine Concern No     Occupational Exposure No     Hobby Hazards No     Sleep Concern No     Stress Concern Yes     Weight Concern No     Special Diet No     Back Care No     Exercise Yes     Comment: runs     Bike Helmet Yes     Seat Belt Yes     Self-Exams No     Parent/sibling w/ CABG, MI or angioplasty before 65F 55M? No   Social History Narrative        Dairy/d 3 servings/d.     Caffeine 1 servings/d    Exercise 4 x week    Sunscreen used - Yes    Seatbelts used - Yes    Working smoke/CO detectors in the home - Yes    Guns stored in the home - No    Self Breast Exams - No    Self Testicular Exam - NA    Eye Exam up to date - Yes    Dental Exam up to date - Yes    Pap Smear up to date - No    Mammogram up to date - NA    PSA up to date - NA    Dexa Scan up to date - NA    Flex Sig / Colonoscopy up to date - NA    Immunizations up to date - Yes    Abuse: Current or Past(Physical, Sexual or  Emotional)- No    Do you feel safe in your environment - Yes        Cornel Ovalle CMA                 Social Determinants of Health     Financial Resource Strain: Not on file   Food Insecurity: Not on file   Transportation Needs: Not on file   Physical Activity: Not on file   Stress: Not on file   Social Connections: Not on file   Intimate Partner Violence: Not on file   Housing Stability: Not on file           Review of System:     Constitutional: Negative for fever or chills  Skin: Negative for rashes  Ears/Nose/Throat: Negative for nasal congestion, sore throat  Respiratory: No shortness of breath, dyspnea on exertion, cough, or hemoptysis  Cardiovascular: Negative for chest pain  Gastrointestinal: Negative for nausea, vomiting  Genitourinary: positive for yeast infection  Musculoskeletal: Negative for myalgias  Neurologic: Negative for headaches  Psychiatric: Negative for depression, anxiety  Hematologic/Lymphatic/Immunologic: Negative  Endocrine: Negative  Behavioral: Negative for tobacco use       Physical Exam:   There were no vitals taken for this visit.        Diagnostic Test Results:     Diagnostic Test Results:  Labs reviewed in Epic    ASSESSMENT/PLAN:       Jessie was seen today for vaginal discharge.    Diagnoses and all orders for this visit:    Yeast infection of the vagina  -     fluconazole (DIFLUCAN) 150 MG tablet; Take 1 tablet (150 mg) by mouth every 3 days for 3 doses            Follow Up Plan:     Patient is instructed to return to Internal Medicine clinic for follow-up visit in 1 week.        No Weaver MD  Internal Medicine  [facility]

## 2022-08-01 ASSESSMENT — ENCOUNTER SYMPTOMS
HEMATOCHEZIA: 0
HEMATURIA: 0
NERVOUS/ANXIOUS: 0
BREAST MASS: 0
DYSURIA: 1
MYALGIAS: 0
COUGH: 0
CONSTIPATION: 0
NAUSEA: 0
JOINT SWELLING: 0
HEADACHES: 0
WEAKNESS: 0
FREQUENCY: 0
SORE THROAT: 0
ABDOMINAL PAIN: 0
DIARRHEA: 0
EYE PAIN: 0
CHILLS: 0
ARTHRALGIAS: 0
FEVER: 0
DIZZINESS: 0
HEARTBURN: 0
PARESTHESIAS: 0
PALPITATIONS: 0
SHORTNESS OF BREATH: 0

## 2022-08-08 ENCOUNTER — OFFICE VISIT (OUTPATIENT)
Dept: FAMILY MEDICINE | Facility: CLINIC | Age: 47
End: 2022-08-08
Payer: COMMERCIAL

## 2022-08-08 VITALS
WEIGHT: 101 LBS | SYSTOLIC BLOOD PRESSURE: 120 MMHG | BODY MASS INDEX: 19.07 KG/M2 | HEIGHT: 61 IN | RESPIRATION RATE: 16 BRPM | HEART RATE: 80 BPM | OXYGEN SATURATION: 99 % | DIASTOLIC BLOOD PRESSURE: 75 MMHG

## 2022-08-08 DIAGNOSIS — Z11.3 SCREENING FOR STD (SEXUALLY TRANSMITTED DISEASE): ICD-10-CM

## 2022-08-08 DIAGNOSIS — Z00.00 ANNUAL PHYSICAL EXAM: Primary | ICD-10-CM

## 2022-08-08 DIAGNOSIS — L71.0 PERIORAL DERMATITIS: ICD-10-CM

## 2022-08-08 DIAGNOSIS — N39.0 RECURRENT UTI: ICD-10-CM

## 2022-08-08 DIAGNOSIS — F41.9 ANXIETY: ICD-10-CM

## 2022-08-08 DIAGNOSIS — Z12.4 CERVICAL CANCER SCREENING: ICD-10-CM

## 2022-08-08 LAB
ALBUMIN UR-MCNC: NEGATIVE MG/DL
APPEARANCE UR: CLEAR
BILIRUB UR QL STRIP: NEGATIVE
CLUE CELLS: NORMAL
COLOR UR AUTO: YELLOW
ERYTHROCYTE [DISTWIDTH] IN BLOOD BY AUTOMATED COUNT: 13.3 % (ref 10–15)
GLUCOSE UR STRIP-MCNC: NEGATIVE MG/DL
HCT VFR BLD AUTO: 42.6 % (ref 35–47)
HGB BLD-MCNC: 14 G/DL (ref 11.7–15.7)
HGB UR QL STRIP: NEGATIVE
KETONES UR STRIP-MCNC: NEGATIVE MG/DL
LEUKOCYTE ESTERASE UR QL STRIP: NEGATIVE
MCH RBC QN AUTO: 30.6 PG (ref 26.5–33)
MCHC RBC AUTO-ENTMCNC: 32.9 G/DL (ref 31.5–36.5)
MCV RBC AUTO: 93 FL (ref 78–100)
NITRATE UR QL: NEGATIVE
PH UR STRIP: 6 [PH] (ref 5–7)
PLATELET # BLD AUTO: 213 10E3/UL (ref 150–450)
RBC # BLD AUTO: 4.57 10E6/UL (ref 3.8–5.2)
SP GR UR STRIP: 1.01 (ref 1–1.03)
TRICHOMONAS, WET PREP: NORMAL
UROBILINOGEN UR STRIP-ACNC: 0.2 E.U./DL
WBC # BLD AUTO: 7 10E3/UL (ref 4–11)
WBC'S/HIGH POWER FIELD, WET PREP: NORMAL
YEAST, WET PREP: NORMAL

## 2022-08-08 PROCEDURE — 85027 COMPLETE CBC AUTOMATED: CPT | Performed by: INTERNAL MEDICINE

## 2022-08-08 PROCEDURE — 80061 LIPID PANEL: CPT | Performed by: INTERNAL MEDICINE

## 2022-08-08 PROCEDURE — 99396 PREV VISIT EST AGE 40-64: CPT | Performed by: INTERNAL MEDICINE

## 2022-08-08 PROCEDURE — 80053 COMPREHEN METABOLIC PANEL: CPT | Performed by: INTERNAL MEDICINE

## 2022-08-08 PROCEDURE — G0145 SCR C/V CYTO,THINLAYER,RESCR: HCPCS | Performed by: INTERNAL MEDICINE

## 2022-08-08 PROCEDURE — 87491 CHLMYD TRACH DNA AMP PROBE: CPT | Performed by: INTERNAL MEDICINE

## 2022-08-08 PROCEDURE — 87210 SMEAR WET MOUNT SALINE/INK: CPT | Performed by: INTERNAL MEDICINE

## 2022-08-08 PROCEDURE — 36415 COLL VENOUS BLD VENIPUNCTURE: CPT | Performed by: INTERNAL MEDICINE

## 2022-08-08 PROCEDURE — 81003 URINALYSIS AUTO W/O SCOPE: CPT | Performed by: INTERNAL MEDICINE

## 2022-08-08 PROCEDURE — 87591 N.GONORRHOEAE DNA AMP PROB: CPT | Performed by: INTERNAL MEDICINE

## 2022-08-08 PROCEDURE — 99214 OFFICE O/P EST MOD 30 MIN: CPT | Mod: 25 | Performed by: INTERNAL MEDICINE

## 2022-08-08 PROCEDURE — 87624 HPV HI-RISK TYP POOLED RSLT: CPT | Performed by: INTERNAL MEDICINE

## 2022-08-08 RX ORDER — DOXYCYCLINE HYCLATE 20 MG
TABLET ORAL
Qty: 180 TABLET | Refills: 1 | Status: SHIPPED | OUTPATIENT
Start: 2022-08-08 | End: 2023-05-19

## 2022-08-08 RX ORDER — LORAZEPAM 0.5 MG/1
TABLET ORAL
Qty: 12 TABLET | Refills: 0 | Status: SHIPPED | OUTPATIENT
Start: 2022-08-08 | End: 2024-03-21

## 2022-08-08 ASSESSMENT — ENCOUNTER SYMPTOMS
PARESTHESIAS: 0
ARTHRALGIAS: 0
DYSURIA: 1
HEARTBURN: 0
HEMATOCHEZIA: 0
CONSTIPATION: 0
SORE THROAT: 0
FEVER: 0
HEADACHES: 0
CHILLS: 0
DIARRHEA: 0
DIZZINESS: 0
JOINT SWELLING: 0
HEMATURIA: 0
PALPITATIONS: 0
WEAKNESS: 0
NERVOUS/ANXIOUS: 0
SHORTNESS OF BREATH: 0
NAUSEA: 0
MYALGIAS: 0
COUGH: 0
EYE PAIN: 0
BREAST MASS: 0
FREQUENCY: 0
ABDOMINAL PAIN: 0

## 2022-08-08 NOTE — PROGRESS NOTES
SUBJECTIVE:   CC: Jessie Karimi is an 46 year old woman who presents for preventive health visit.      Patient has been advised of split billing requirements and indicates understanding: Yes  Healthy Habits:     Getting at least 3 servings of Calcium per day:  Yes    Bi-annual eye exam:  Yes    Dental care twice a year:  Yes    Sleep apnea or symptoms of sleep apnea:  None    Diet:  Regular (no restrictions)    Frequency of exercise:  6-7 days/week    Duration of exercise:  Greater than 60 minutes    Taking medications regularly:  Yes    Medication side effects:  None    PHQ-2 Total Score: 2    Additional concerns today:  Yes    Jessie is a very pleasant 46 year old who presented to the clinic for annual physical exam.  She was recently  and starting seeing someone. Although she is not worried about STD, she started having more frequent UTI's.  She usually gets more frequent UTI with sexual intercourse.   She has hx of several abnormal pap smear  Last colposcopy 2 years ago and came out normal. She gets pap every year.   She is interested in IUD for birth control. She does get periods but irregular and heavy. She is kayden-menopausal.    Today's PHQ-2 Score:   PHQ-2 ( 1999 Pfizer) 8/1/2022   Q1: Little interest or pleasure in doing things 1   Q2: Feeling down, depressed or hopeless 1   PHQ-2 Score 2   PHQ-2 Total Score (12-17 Years)- Positive if 3 or more points; Administer PHQ-A if positive -   Q1: Little interest or pleasure in doing things Several days   Q2: Feeling down, depressed or hopeless Several days   PHQ-2 Score 2     Abuse: Current or Past (Physical, Sexual or Emotional) - No  Do you feel safe in your environment? Yes    Have you ever done Advance Care Planning? (For example, a Health Directive, POLST, or a discussion with a medical provider or your loved ones about your wishes): No, advance care planning information given to patient to review.  Patient declined advance care planning  discussion at this time.    Social History     Tobacco Use     Smoking status: Never Smoker     Smokeless tobacco: Never Used   Substance Use Topics     Alcohol use: Yes     Comment: occasionaly     If you drink alcohol do you typically have >3 drinks per day or >7 drinks per week? No    Alcohol Use 8/1/2022   Prescreen: >3 drinks/day or >7 drinks/week? No   Prescreen: >3 drinks/day or >7 drinks/week? -       Reviewed orders with patient.  Reviewed health maintenance and updated orders accordingly - Yes  Lab work is in process    Breast Cancer Screening:    FHS-7:   Breast CA Risk Assessment (FHS-7) 1/14/2022 8/8/2022   Did any of your first-degree relatives have breast or ovarian cancer? Yes Yes   Did any of your relatives have bilateral breast cancer? No No   Did any man in your family have breast cancer? No -   Did any woman in your family have breast and ovarian cancer? No -   Did any woman in your family have breast cancer before age 50 y? No -   Do you have 2 or more relatives with breast and/or ovarian cancer? No -   Do you have 2 or more relatives with breast and/or bowel cancer? No -     Mammogram Screening: Recommended annual mammography  Pertinent mammograms are reviewed under the imaging tab.    History of abnormal Pap smear: YES - other categories - see link Cervical Cytology Screening Guidelines  PAP / HPV Latest Ref Rng & Units 9/23/2021 9/21/2020 7/22/2019   PAP   Negative for Intraepithelial Lesion or Malignancy (NILM) - -   PAP (Historical) - - NIL ASC-US(A)   HPV16 Negative Negative Negative Negative   HPV18 Negative Negative Negative Negative   HRHPV Negative Negative Positive(A) Positive(A)     Reviewed and updated as needed this visit by clinical staff                  Reviewed and updated as needed this visit by Provider                   Review of Systems   Constitutional: Negative for chills and fever.   HENT: Negative for congestion, ear pain, hearing loss and sore throat.    Eyes: Negative  for pain and visual disturbance.   Respiratory: Negative for cough and shortness of breath.    Cardiovascular: Negative for chest pain, palpitations and peripheral edema.   Gastrointestinal: Negative for abdominal pain, constipation, diarrhea, heartburn, hematochezia and nausea.   Breasts:  Negative for tenderness, breast mass and discharge.   Genitourinary: Positive for dysuria, pelvic pain and urgency. Negative for frequency, genital sores, hematuria, vaginal bleeding and vaginal discharge.   Musculoskeletal: Negative for arthralgias, joint swelling and myalgias.   Skin: Negative for rash.   Neurological: Negative for dizziness, weakness, headaches and paresthesias.   Psychiatric/Behavioral: Negative for mood changes. The patient is not nervous/anxious.      OBJECTIVE:   There were no vitals taken for this visit.  Physical Exam  Vitals reviewed.   Constitutional:       Appearance: Normal appearance.   HENT:      Right Ear: Tympanic membrane normal. There is no impacted cerumen.      Left Ear: Tympanic membrane normal. There is no impacted cerumen.      Mouth/Throat:      Mouth: Mucous membranes are moist.      Pharynx: Oropharynx is clear. No oropharyngeal exudate or posterior oropharyngeal erythema.   Cardiovascular:      Rate and Rhythm: Normal rate and regular rhythm.      Heart sounds: Normal heart sounds. No murmur heard.    No gallop.   Pulmonary:      Effort: Pulmonary effort is normal. No respiratory distress.      Breath sounds: Normal breath sounds. No stridor. No wheezing, rhonchi or rales.   Abdominal:      General: Abdomen is flat. There is no distension.      Palpations: Abdomen is soft. There is no mass.      Tenderness: There is no abdominal tenderness. There is no guarding.      Hernia: No hernia is present.   Genitourinary:     Vagina: Normal.      Cervix: Normal.   Musculoskeletal:         General: Normal range of motion.      Cervical back: Normal range of motion and neck supple. No rigidity.  "     Right lower leg: No edema.      Left lower leg: No edema.   Lymphadenopathy:      Cervical: No cervical adenopathy.   Skin:     General: Skin is warm and dry.   Neurological:      General: No focal deficit present.      Mental Status: She is alert and oriented to person, place, and time.   Psychiatric:         Mood and Affect: Mood normal.       ASSESSMENT/PLAN:   Jessie was seen today for physical.    Diagnoses and all orders for this visit:    Annual physical exam  -     COMPREHENSIVE METABOLIC PANEL; Future  -     CBC with Platelets (Today); Future  -     Lipid Profile; Future    Cervical cancer screening  -     Pap Screen with HPV - recommended age 30 - 65 years    Recurrent UTI  Will discuss about prophylaxis with antibiotic if another UTI,  -     UA Macro with Reflex to Micro and Culture - lab collect; Future  -     Wet prep - Clinic Collect    Screening for STD (sexually transmitted disease)  -     Chlamydia trachomatis PCR; Future  -     Neisseria gonorrhoeae PCR; Future    Perioral dermatitis  -     doxycycline hyclate (PERIOSTAT) 20 MG tablet; TAKE 1 TABLET BY MOUTH TWICE A DAY    Anxiety  Anxiety related to flight/traveling.  -     LORazepam (ATIVAN) 0.5 MG tablet; 1-2 tablets once daily as needed for anxiety    Other orders  -     REVIEW OF HEALTH MAINTENANCE PROTOCOL ORDERS    Patient has been advised of split billing requirements and indicates understanding: Yes    COUNSELING:  Reviewed preventive health counseling, as reflected in patient instructions       Healthy diet/nutrition       Safe sex practices/STD prevention    Estimated body mass index is 18.89 kg/m  as calculated from the following:    Height as of 6/13/22: 1.549 m (5' 1\").    Weight as of 6/13/22: 45.4 kg (100 lb).    She reports that she has never smoked. She has never used smokeless tobacco.    KIRSTEN STANLEY MD  [facility]"

## 2022-08-09 LAB
ALBUMIN SERPL-MCNC: 4.6 G/DL (ref 3.4–5)
ALP SERPL-CCNC: 35 U/L (ref 40–150)
ALT SERPL W P-5'-P-CCNC: 22 U/L (ref 0–50)
ANION GAP SERPL CALCULATED.3IONS-SCNC: 7 MMOL/L (ref 3–14)
AST SERPL W P-5'-P-CCNC: 23 U/L (ref 0–45)
BILIRUB SERPL-MCNC: 0.6 MG/DL (ref 0.2–1.3)
BUN SERPL-MCNC: 12 MG/DL (ref 7–30)
C TRACH DNA SPEC QL NAA+PROBE: NEGATIVE
CALCIUM SERPL-MCNC: 9.6 MG/DL (ref 8.5–10.1)
CHLORIDE BLD-SCNC: 105 MMOL/L (ref 94–109)
CHOLEST SERPL-MCNC: 224 MG/DL
CO2 SERPL-SCNC: 26 MMOL/L (ref 20–32)
CREAT SERPL-MCNC: 0.56 MG/DL (ref 0.52–1.04)
FASTING STATUS PATIENT QL REPORTED: NO
GFR SERPL CREATININE-BSD FRML MDRD: >90 ML/MIN/1.73M2
GLUCOSE BLD-MCNC: 99 MG/DL (ref 70–99)
HDLC SERPL-MCNC: 68 MG/DL
LDLC SERPL CALC-MCNC: 129 MG/DL
N GONORRHOEA DNA SPEC QL NAA+PROBE: NEGATIVE
NONHDLC SERPL-MCNC: 156 MG/DL
POTASSIUM BLD-SCNC: 3.9 MMOL/L (ref 3.4–5.3)
PROT SERPL-MCNC: 7.8 G/DL (ref 6.8–8.8)
SODIUM SERPL-SCNC: 138 MMOL/L (ref 133–144)
TRIGL SERPL-MCNC: 137 MG/DL

## 2022-08-10 LAB
BKR LAB AP GYN ADEQUACY: NORMAL
BKR LAB AP GYN INTERPRETATION: NORMAL
BKR LAB AP HPV REFLEX: NORMAL
BKR LAB AP PREVIOUS ABNL DX: NORMAL
BKR LAB AP PREVIOUS ABNORMAL: NORMAL
PATH REPORT.COMMENTS IMP SPEC: NORMAL
PATH REPORT.COMMENTS IMP SPEC: NORMAL
PATH REPORT.RELEVANT HX SPEC: NORMAL

## 2022-08-17 NOTE — TELEPHONE ENCOUNTER
3/4/16: Pap - ASCUS, +HR HPV. Plan colp  3/22/16 [place]: ECC normal. Plan: cotest in 12 mo. Tracking.   3/9/17:Pap--LSIL, neg HPV. Plan colp  05/17/17: [place] Bx normal. Plan cotest in 1 year per provider.  5/11/18 LSIL Pap, + HR HPV (Neg 16/18). Plan colp due by 8/11/18.   6/15/18 [place] bx and ECC: negative. Plan: cotest in 1 yr.  7/22/19 ASCUS Pap, + HR HPV (Neg 16/18). Plan colp  09/12/19: [place] Bx - cannot exclude low grade dysplasia. Plan cotest in 1 year.   9/21/20 NIL Pap, + HR HPV (neg 16/18). Plan cotest in 1 year.   9/23/21 NIL pap, neg HPV. Plan: cotest in 1 year per provider  8/8/22 NIL Pap, Neg HPV. Plan cotest in 3 years.

## 2022-08-31 DIAGNOSIS — Z01.812 PRE-PROCEDURE LAB EXAM: Primary | ICD-10-CM

## 2022-10-23 ENCOUNTER — HEALTH MAINTENANCE LETTER (OUTPATIENT)
Age: 47
End: 2022-10-23

## 2022-11-07 ENCOUNTER — MYC MEDICAL ADVICE (OUTPATIENT)
Dept: FAMILY MEDICINE | Facility: CLINIC | Age: 47
End: 2022-11-07

## 2022-11-07 DIAGNOSIS — N89.8 VAGINAL IRRITATION: ICD-10-CM

## 2022-11-09 RX ORDER — FLUCONAZOLE 150 MG/1
150 TABLET ORAL ONCE
Qty: 3 TABLET | Refills: 0 | Status: SHIPPED | OUTPATIENT
Start: 2022-11-09 | End: 2022-11-09

## 2022-11-09 NOTE — TELEPHONE ENCOUNTER
Please review my chart and advice if Fluconazole refill is appropriate or e-visit is needed.     Routing refill request to provider for review/approval because:  Drug not active on patient's medication list  Medication is reported/historical    Please respond back to patient or send to triage to follow up. If patient needs to be scheduled, please route to team coordinators.

## 2022-12-07 ENCOUNTER — ANCILLARY PROCEDURE (OUTPATIENT)
Dept: MAMMOGRAPHY | Facility: CLINIC | Age: 47
End: 2022-12-07
Attending: NURSE PRACTITIONER
Payer: COMMERCIAL

## 2022-12-07 DIAGNOSIS — Z12.31 VISIT FOR SCREENING MAMMOGRAM: ICD-10-CM

## 2022-12-07 PROCEDURE — 77067 SCR MAMMO BI INCL CAD: CPT | Mod: TC | Performed by: RADIOLOGY

## 2022-12-07 PROCEDURE — 77063 BREAST TOMOSYNTHESIS BI: CPT | Mod: TC | Performed by: RADIOLOGY

## 2023-03-02 ENCOUNTER — OFFICE VISIT (OUTPATIENT)
Dept: FAMILY MEDICINE | Facility: CLINIC | Age: 48
End: 2023-03-02
Payer: COMMERCIAL

## 2023-03-02 VITALS
HEIGHT: 61 IN | OXYGEN SATURATION: 100 % | HEART RATE: 81 BPM | TEMPERATURE: 97 F | WEIGHT: 100.5 LBS | DIASTOLIC BLOOD PRESSURE: 68 MMHG | SYSTOLIC BLOOD PRESSURE: 106 MMHG | BODY MASS INDEX: 18.98 KG/M2 | RESPIRATION RATE: 16 BRPM

## 2023-03-02 DIAGNOSIS — B37.31 YEAST INFECTION OF THE VAGINA: ICD-10-CM

## 2023-03-02 DIAGNOSIS — N39.0 RECURRENT UTI: Primary | ICD-10-CM

## 2023-03-02 LAB
ALBUMIN UR-MCNC: NEGATIVE MG/DL
APPEARANCE UR: CLEAR
BILIRUB UR QL STRIP: NEGATIVE
COLOR UR AUTO: YELLOW
GLUCOSE UR STRIP-MCNC: NEGATIVE MG/DL
HGB UR QL STRIP: NEGATIVE
KETONES UR STRIP-MCNC: NEGATIVE MG/DL
LEUKOCYTE ESTERASE UR QL STRIP: NEGATIVE
NITRATE UR QL: NEGATIVE
PH UR STRIP: 6 [PH] (ref 5–7)
SP GR UR STRIP: 1.02 (ref 1–1.03)
UROBILINOGEN UR STRIP-ACNC: 0.2 E.U./DL

## 2023-03-02 PROCEDURE — 87086 URINE CULTURE/COLONY COUNT: CPT | Performed by: INTERNAL MEDICINE

## 2023-03-02 PROCEDURE — 99214 OFFICE O/P EST MOD 30 MIN: CPT | Performed by: INTERNAL MEDICINE

## 2023-03-02 PROCEDURE — 81003 URINALYSIS AUTO W/O SCOPE: CPT | Performed by: INTERNAL MEDICINE

## 2023-03-02 RX ORDER — FLUCONAZOLE 150 MG/1
150 TABLET ORAL ONCE
Qty: 1 TABLET | Refills: 3 | Status: SHIPPED | OUTPATIENT
Start: 2023-03-02 | End: 2023-03-02

## 2023-03-02 ASSESSMENT — PAIN SCALES - GENERAL: PAINLEVEL: NO PAIN (0)

## 2023-03-02 NOTE — PROGRESS NOTES
"  Assessment & Plan     Recurrent UTI  Repeat UA in am.   Urine culture  Post coital abx - most likely macrobid.  She takes D-mannose - no strong evidence it works but she can continue it. ?placebo effect  - UA macro with reflex to Microscopic and Culture - Clinc Collect  - Urine Culture Aerobic Bacterial - lab collect; Future    Yeast infection of the vagina  - fluconazole (DIFLUCAN) 150 MG tablet; Take 1 tablet (150 mg) by mouth once for 1 dose     See Patient Instructions    No follow-ups on file.    KIRSTEN STANLEY MD  [facility]    Gregorio Roman is a 47 year old, presenting for the following health issues:  Urinary Problem    Jessie is here for acute concerns.  Post coital symptoms of dysuria, most pronounced in the morning.   UA is clean today.  Premenopausal, no vaginal dryness.  Consider post coital abx   She wants to check urine test early morning when she has symptoms.    History of Present Illness       Reason for visit:  Recurring UTIs    She eats 2-3 servings of fruits and vegetables daily.She consumes 0 sweetened beverage(s) daily.She exercises with enough effort to increase her heart rate 60 or more minutes per day.  She exercises with enough effort to increase her heart rate 7 days per week.   She is taking medications regularly.     Review of Systems       Objective    /68 (BP Location: Left arm, Patient Position: Sitting, Cuff Size: Adult Regular)   Pulse 81   Temp 97  F (36.1  C)   Resp 16   Ht 1.549 m (5' 1\")   Wt 45.6 kg (100 lb 8 oz)   SpO2 100%   BMI 18.99 kg/m    Body mass index is 18.99 kg/m .  Physical Exam           "

## 2023-03-03 ENCOUNTER — LAB (OUTPATIENT)
Dept: LAB | Facility: CLINIC | Age: 48
End: 2023-03-03
Payer: COMMERCIAL

## 2023-03-03 ENCOUNTER — MYC MEDICAL ADVICE (OUTPATIENT)
Dept: FAMILY MEDICINE | Facility: CLINIC | Age: 48
End: 2023-03-03

## 2023-03-03 ENCOUNTER — TELEPHONE (OUTPATIENT)
Dept: FAMILY MEDICINE | Facility: CLINIC | Age: 48
End: 2023-03-03

## 2023-03-03 ENCOUNTER — DOCUMENTATION ONLY (OUTPATIENT)
Dept: LAB | Facility: CLINIC | Age: 48
End: 2023-03-03

## 2023-03-03 DIAGNOSIS — Z11.3 SCREENING FOR STD (SEXUALLY TRANSMITTED DISEASE): Primary | ICD-10-CM

## 2023-03-03 DIAGNOSIS — N39.0 RECURRENT UTI: ICD-10-CM

## 2023-03-03 DIAGNOSIS — Z20.2 ENCOUNTER FOR ASSESSMENT OF STD EXPOSURE: Primary | ICD-10-CM

## 2023-03-03 DIAGNOSIS — Z11.3 SCREENING FOR STD (SEXUALLY TRANSMITTED DISEASE): ICD-10-CM

## 2023-03-03 DIAGNOSIS — N39.0 URINARY TRACT INFECTION WITHOUT HEMATURIA, SITE UNSPECIFIED: Primary | ICD-10-CM

## 2023-03-03 LAB
ALBUMIN UR-MCNC: NEGATIVE MG/DL
APPEARANCE UR: CLEAR
BACTERIA #/AREA URNS HPF: ABNORMAL /HPF
BILIRUB UR QL STRIP: NEGATIVE
C TRACH DNA SPEC QL NAA+PROBE: NEGATIVE
COLOR UR AUTO: YELLOW
GLUCOSE UR STRIP-MCNC: NEGATIVE MG/DL
HGB UR QL STRIP: ABNORMAL
HOLD SPECIMEN: NORMAL
KETONES UR STRIP-MCNC: NEGATIVE MG/DL
LEUKOCYTE ESTERASE UR QL STRIP: ABNORMAL
N GONORRHOEA DNA SPEC QL NAA+PROBE: NEGATIVE
NITRATE UR QL: NEGATIVE
PH UR STRIP: 7 [PH] (ref 5–7)
RBC #/AREA URNS AUTO: ABNORMAL /HPF
SP GR UR STRIP: 1.02 (ref 1–1.03)
SQUAMOUS #/AREA URNS AUTO: ABNORMAL /LPF
UROBILINOGEN UR STRIP-ACNC: 0.2 E.U./DL
WBC #/AREA URNS AUTO: ABNORMAL /HPF

## 2023-03-03 PROCEDURE — 87591 N.GONORRHOEAE DNA AMP PROB: CPT | Performed by: INTERNAL MEDICINE

## 2023-03-03 PROCEDURE — 86780 TREPONEMA PALLIDUM: CPT

## 2023-03-03 PROCEDURE — 36415 COLL VENOUS BLD VENIPUNCTURE: CPT

## 2023-03-03 PROCEDURE — 87186 SC STD MICRODIL/AGAR DIL: CPT

## 2023-03-03 PROCEDURE — 81001 URINALYSIS AUTO W/SCOPE: CPT

## 2023-03-03 PROCEDURE — 87086 URINE CULTURE/COLONY COUNT: CPT

## 2023-03-03 PROCEDURE — 87088 URINE BACTERIA CULTURE: CPT

## 2023-03-03 PROCEDURE — 87389 HIV-1 AG W/HIV-1&-2 AB AG IA: CPT

## 2023-03-03 PROCEDURE — 87491 CHLMYD TRACH DNA AMP PROBE: CPT | Performed by: INTERNAL MEDICINE

## 2023-03-03 RX ORDER — NITROFURANTOIN 25; 75 MG/1; MG/1
100 CAPSULE ORAL 2 TIMES DAILY
Qty: 10 CAPSULE | Refills: 0 | Status: SHIPPED | OUTPATIENT
Start: 2023-03-03 | End: 2023-03-07

## 2023-03-03 NOTE — PROGRESS NOTES
Pt came in for her repeat urine sample, she said she was supposed to get STI testing. Please add on any test to the extra samples drawn today. Thank you

## 2023-03-04 LAB
BACTERIA UR CULT: NO GROWTH
T PALLIDUM AB SER QL: NONREACTIVE

## 2023-03-05 LAB — BACTERIA UR CULT: ABNORMAL

## 2023-03-06 ENCOUNTER — MYC MEDICAL ADVICE (OUTPATIENT)
Dept: FAMILY MEDICINE | Facility: CLINIC | Age: 48
End: 2023-03-06
Payer: COMMERCIAL

## 2023-03-06 DIAGNOSIS — N39.0 RECURRENT UTI: Primary | ICD-10-CM

## 2023-03-06 LAB — HIV 1+2 AB+HIV1 P24 AG SERPL QL IA: NONREACTIVE

## 2023-03-07 ENCOUNTER — MYC MEDICAL ADVICE (OUTPATIENT)
Dept: FAMILY MEDICINE | Facility: CLINIC | Age: 48
End: 2023-03-07
Payer: COMMERCIAL

## 2023-03-07 DIAGNOSIS — N39.0 RECURRENT UTI: Primary | ICD-10-CM

## 2023-03-07 RX ORDER — NITROFURANTOIN 25; 75 MG/1; MG/1
100 CAPSULE ORAL DAILY PRN
Qty: 30 CAPSULE | Refills: 3 | Status: SHIPPED | OUTPATIENT
Start: 2023-03-07 | End: 2023-06-01

## 2023-03-07 NOTE — TELEPHONE ENCOUNTER
To PCP    See [employer] message. Please advise if you want to change rx.    Pharmacy pended.    Abbie Ramirez RN on 3/7/2023 at 3:01 PM

## 2023-03-31 NOTE — TELEPHONE ENCOUNTER
DIAGNOSIS: R hip pain   APPOINTMENT DATE: 4.20.23   NOTES STATUS DETAILS   MEDICATION LIST Internal

## 2023-04-07 ENCOUNTER — OFFICE VISIT (OUTPATIENT)
Dept: UROLOGY | Facility: CLINIC | Age: 48
End: 2023-04-07
Payer: COMMERCIAL

## 2023-04-07 VITALS
DIASTOLIC BLOOD PRESSURE: 78 MMHG | HEIGHT: 61 IN | BODY MASS INDEX: 18.88 KG/M2 | OXYGEN SATURATION: 98 % | HEART RATE: 72 BPM | WEIGHT: 100 LBS | SYSTOLIC BLOOD PRESSURE: 114 MMHG

## 2023-04-07 DIAGNOSIS — N39.0 RECURRENT UTI: Primary | ICD-10-CM

## 2023-04-07 DIAGNOSIS — R35.0 URINARY FREQUENCY: ICD-10-CM

## 2023-04-07 DIAGNOSIS — R39.15 URINARY URGENCY: ICD-10-CM

## 2023-04-07 LAB
ALBUMIN UR-MCNC: NEGATIVE MG/DL
APPEARANCE UR: CLEAR
BILIRUB UR QL STRIP: NEGATIVE
COLOR UR AUTO: YELLOW
GLUCOSE UR STRIP-MCNC: NEGATIVE MG/DL
HGB UR QL STRIP: NEGATIVE
KETONES UR STRIP-MCNC: NEGATIVE MG/DL
LEUKOCYTE ESTERASE UR QL STRIP: NEGATIVE
NITRATE UR QL: NEGATIVE
PH UR STRIP: 6 [PH] (ref 5–7)
RESIDUAL VOLUME (RV) (EXTERNAL): 31
SP GR UR STRIP: 1.02 (ref 1–1.03)
UROBILINOGEN UR STRIP-ACNC: 0.2 E.U./DL

## 2023-04-07 PROCEDURE — 99204 OFFICE O/P NEW MOD 45 MIN: CPT | Mod: 25 | Performed by: PHYSICIAN ASSISTANT

## 2023-04-07 PROCEDURE — 51798 US URINE CAPACITY MEASURE: CPT | Performed by: PHYSICIAN ASSISTANT

## 2023-04-07 PROCEDURE — 81003 URINALYSIS AUTO W/O SCOPE: CPT | Mod: QW | Performed by: PHYSICIAN ASSISTANT

## 2023-04-07 ASSESSMENT — PAIN SCALES - GENERAL: PAINLEVEL: NO PAIN (1)

## 2023-04-07 NOTE — LETTER
4/7/2023       RE: Jsesie Karimi  [address]  [facility] [address]     Dear Colleague,    Thank you for referring your patient, Jessie Karimi, to the [facility]OLOG[facility] [place] at [facility]. Please see a copy of my visit note below.    Urology Clinic    Name: Jessie Karimi    MRN: [id number]   YOB: 1975  Accompanied at today's visit by:self                Assessment and Plan:   47 year old female with urinary frequency/urgecy, Aleyda    - UA negative  - PVR WNL  - Educated patient about UTI prevention.   - Discussed OTC medications/supplements for UTI prevention; handout given.   - Can try AZO prn and push fluids for UTI symptoms; advised to contact clinic if symptoms worsen or don't improve after 24-48 hours.   - Educated and encourage good vulvar hygiene at length.  - Patient to contact clinic if develops UTI symptoms in the future.  - Discussed risks/benifits of antibiotic for UTI prophylaxis and also risk of resistance. Recommend taking Macrobid prior to intercourse and second dose 12 hours after first dose.   - Consider switching to methenamine with vitamin C if has breakthrough UTIs.  - Have patient follow-up for CT urogram and cystoscopy with Dr. Barajas. Advised patient to have CT done before cysto.  - Patient to send [employer] message of UC results from [facility] and urgent cares as do not have these results in EMR  - Avoid bladder irritants  - Offered oxybutynin for urinary frequency, patient declined.  - Offered PFPT; patient declined.      Orders Placed This Encounter   Procedures    MEASURE POST-VOID RESIDUAL URINE/BLADDER CAPACITY, US NON-IMAGING (09806)    CT Urogram wo & w Contrast    UA without Microscopic [[id number]]       After discussing the assessment and plan with patient, patient verbalized understanding and agreed to the above plan. All questions answered.     45 minutes were spent today on the date of  the encounter in reviewing the EMR, direct patient care, coordination of care and documentation in addition to exam, ordering CT reviewing abd US from , reviewing labs.    Maria A Rock PA-C  2023    Patient Care Team:  Kirsten Garcia MD as PCP - General (Internal Medicine)  Kirsten Garcia MD as Assigned PCP  Maria A Rock PA-C as Physician Assistant  KIRSTEN GARCIA          Chief Complaint:   Aleyda          History of Present Illness:   2023    HISTORY: Jessie Karimi is a 47 year old  female as a new consultation for concerns of Aleyda. Typical UTI symptoms include burning with urination, urinary urgency/frequncy. Reports one episode of gross hematuria in the past associated with a UTI. States she has struggled with UTIs since she was a child. Reports having a procedure done where they filled her bladder and took radiographs while voiding as a child and everything was normal. Denies any other urologic procedures/sugeries in the past. States she has had 7 UTIs over the past 9 months since starting a new relationship. Voids after intercourse. Wears underwear to bed. Takes probiotic. Was taking D-mannos but thought it was masking her UTIs because UC would come back negative, but the would stop the D-mannos and UC would show UTI. [facility] or [facility]. States the urgent cares would culture her urine; do not have these results. Per EMR, had UTI on 3/3/23 and her other 2 UCs over the past 12 months showed no growth.     When not having UTI voiding every every hour during the day and 1-2x/night.  Drinks carbonated beverates and coffee throughout the day. Denies urinary incontinence. Denies gross hematuria except for one episode when had a UTI years ago. Thinks UTIs are associated with intercourse. On macrobid once prior to intercourse.  Denies history of kidney stones. Sexually active with new partner. Denies pain with intercourse. Denies concern for STIs and  has always been negative in the past.  Bowels are regular. [facility] procedure on bladder with x-rays. Hx of abnormal paps and colposcopies in the past. Hx of HPV. Of note, most recent pap smear in 8/2022 and NILM and negative HPV. No other  surgeries per patient. Has regular menses. Patient voices no other concerns at this time.          Past Medical History:     Past Medical History:   Diagnosis Date    Abnormal Pap smear of cervix 2016, 2017, 2018, 2019    see problem list    Allergic 08/20/2008    Anal fissure     Cervical high risk HPV (human papillomavirus) test positive 2016, 2018, 2019    See Problem List    H/O colposcopy with cervical biopsy 05/17/2017 05/17/17: [place] [facility] normal.     Personal history of other genital system and obstetric disorders(V13.29)     Spider veins             Past Surgical History:     Past Surgical History:   Procedure Laterality Date    BIOPSY  04/2013    moles removed by [facility] and biopsied; benign    COLONOSCOPY N/A 06/13/2022    Procedure: COLONOSCOPY, WITH POLYPECTOMY AND BIOPSY;  Surgeon: Joie Barraza MD;  Location: [facility] NONSPECIFIC PROCEDURE      wisdom teeth removed            Social History:     Social History     Tobacco Use    Smoking status: Never    Smokeless tobacco: Never   Vaping Use    Vaping status: Not on file   Substance Use Topics    Alcohol use: Yes     Comment: occasionaly            Family History:     Family History   Problem Relation Age of Onset    Diabetes Mother         Type II    Breast Cancer Mother         Diagnosed Aug 2012; double mast + chem; remiss.    Heart Failure Mother     Diabetes Father         Type II    Prostate Cancer Father     C.A.D. Maternal Grandmother     Cerebrovascular Disease Maternal Grandmother     Diabetes Maternal Uncle     Hypertension No family hx of     Cancer - colorectal No family hx of               Allergies:     Allergies   Allergen Reactions    Tree Nuts [Nuts] Anaphylaxis    Latex     Quinolones          "    Medications:     Current Outpatient Medications   Medication Sig    Ascorbic Acid (VITAMIN C PO) Take by mouth daily    Cholecalciferol (VITAMIN D PO) Take 1,000 Units by mouth daily    CRANBERRY PO Take by mouth daily    doxycycline hyclate (PERIOSTAT) 20 MG tablet TAKE 1 TABLET BY MOUTH TWICE A DAY    ferrous sulfate (FEROSUL) 325 (65 Fe) MG tablet Take 325 mg by mouth daily (with breakfast)    lactobacillus rhamnosus, GG, (CULTURELL) capsule Take 1 capsule by mouth 2 times daily    LORazepam (ATIVAN) 0.5 MG tablet 1-2 tablets once daily as needed for anxiety    nitroFURantoin macrocrystal-monohydrate (MACROBID) 100 MG capsule Take 1 capsule (100 mg) by mouth daily as needed (post coital antibiotic for UTI prevention)    ZYRTEC 10 MG OR TABS 1 TABLET DAILY     No current facility-administered medications for this visit.             Review of Systems:    ROS: 14 point ROS neg other than the symptoms noted above in the HPI.          Physical Exam:   Blood pressure 114/78, pulse 72, height 1.549 m (5' 1\"), weight 45.4 kg (100 lb), SpO2 98 %, not currently breastfeeding.  5' 1\", Body mass index is 18.89 kg/m ., 100 lbs 0 oz  Gen appearance: Age-appropriate appearing female in NAD.   HEENT:  EOMI, conjunctiva clear/white. Normal ROM of neck for age.   Psych:  alert , In no acute distress.  Neuro:  A&Ox3  Skin:  Clear of obvious rashes, ecchymoses.  Resp:  Normal respiratory effort; not in acute respiratory distress.   Vasc:  Regular rate.  lymph:  No obvious LE edema bilaterally.     exam:  Vulva is normal in appearance. Urethra normal.. Negative for ABDIEL with reduction of speculum when instructed to cough. No pain to palpation along internal or external exam. No prolapse appreciated. Strength 4/5. No obvious masses, lesions, ulcers, bleeding noted on internal or external exam.          Data:    PVR  31mL    Labs:  UA RESULTS:  Recent Labs   Lab Test 04/07/23  1131 03/03/23  0814   COLOR Yellow Yellow   APPEARANCE " Clear Clear   URINEGLC Negative Negative   URINEBILI Negative Negative   URINEKETONE Negative Negative   SG 1.020 1.020   UBLD Negative Trace*   URINEPH 6.0 7.0   PROTEIN Negative Negative   UROBILINOGEN 0.2 0.2   NITRITE Negative Negative   LEUKEST Negative Trace*   RBCU  --  2-5*   WBCU  --  10-25*         UC  3/3/23 50,000-100,000 Klebsiella pneumoniae with resistances  3/2/23 no growth  7/29/22 No growth     Imaging:  Abdominal US 7/2020  FINDINGS:  Ultrasound imaging in the area of patient's umbilical  region is performed. No evidence of umbilical hernia or other  abnormality to account for patient's symptoms. No fluid collection,  cyst or mass is appreciated. Sagittal imaging demonstrates the area of  pain overlying the anterior inferior margin of the left hepatic lobe.  No obvious abnormality on limited imaging through this area.                                                           IMPRESSION: No ultrasound abnormality or evidence of hernia in the  region of patient's periumbilical pain.

## 2023-04-07 NOTE — NURSING NOTE
Pt states her urethra hurts a lot.  Pt has RuitS often and since she was younger.  PVR by scanner=31mL  Pt father had prostate and bladder cancer.    CODY Zepeda CMA

## 2023-04-07 NOTE — PROGRESS NOTES
Urology Clinic    Name: Jessie Karimi    MRN: [id number]   YOB: 1975  Accompanied at today's visit by:self                Assessment and Plan:   47 year old female with urinary frequency/urgecy, Aleyda    - UA negative  - PVR WNL  - Educated patient about UTI prevention.   - Discussed OTC medications/supplements for UTI prevention; handout given.   - Can try AZO prn and push fluids for UTI symptoms; advised to contact clinic if symptoms worsen or don't improve after 24-48 hours.   - Educated and encourage good vulvar hygiene at length.  - Patient to contact clinic if develops UTI symptoms in the future.  - Discussed risks/benifits of antibiotic for UTI prophylaxis and also risk of resistance. Recommend taking Macrobid prior to intercourse and second dose 12 hours after first dose.   - Consider switching to methenamine with vitamin C if has breakthrough UTIs.  - Have patient follow-up for CT urogram and cystoscopy with Dr. Barajas. Advised patient to have CT done before cysto.  - Patient to send [employer] message of UC results from [facility] clinics and urgent cares as do not have these results in EMR  - Avoid bladder irritants  - Offered oxybutynin for urinary frequency, patient declined.  - Offered PFPT; patient declined.      Orders Placed This Encounter   Procedures     MEASURE POST-VOID RESIDUAL URINE/BLADDER CAPACITY, US NON-IMAGING (98661)     CT Urogram wo & w Contrast     UA without Microscopic [[id number]]       After discussing the assessment and plan with patient, patient verbalized understanding and agreed to the above plan. All questions answered.     45 minutes were spent today on the date of the encounter in reviewing the EMR, direct patient care, coordination of care and documentation in addition to exam, ordering CT reviewing abd US from 2020, reviewing labs.    Maria A Rock PA-C  April 7, 2023    Patient Care Team:  Surkeha Garcia MD as PCP - General (Internal Medicine)  Surekha Garcia MD  as Assigned PCP  Maria A Rock PA-C as Physician Assistant  KIRSTEN STANLEY          Chief Complaint:   Aleyda          History of Present Illness:   2023    HISTORY: Jessie Karimi is a 47 year old  female as a new consultation for concerns of Aleyda. Typical UTI symptoms include burning with urination, urinary urgency/frequncy. Reports one episode of gross hematuria in the past associated with a UTI. States she has struggled with UTIs since she was a child. Reports having a procedure done where they filled her bladder and took radiographs while voiding as a child and everything was normal. Denies any other urologic procedures/sugeries in the past. States she has had 7 UTIs over the past 9 months since starting a new relationship. Voids after intercourse. Wears underwear to bed. Takes probiotic. Was taking D-mannos but thought it was masking her UTIs because UC would come back negative, but the would stop the D-mannos and UC would show UTI. [facility] or [facility]. States the urgent cares would culture her urine; do not have these results. Per EMR, had UTI on 3/3/23 and her other 2 UCs over the past 12 months showed no growth.     When not having UTI voiding every every hour during the day and 1-2x/night.  Drinks carbonated beverates and coffee throughout the day. Denies urinary incontinence. Denies gross hematuria except for one episode when had a UTI years ago. Thinks UTIs are associated with intercourse. On macrobid once prior to intercourse.  Denies history of kidney stones. Sexually active with new partner. Denies pain with intercourse. Denies concern for STIs and has always been negative in the past.  Bowels are regular. [facility] procedure on bladder with x-rays. Hx of abnormal paps and colposcopies in the past. Hx of HPV. Of note, most recent pap smear in 2022 and NILM and negative HPV. No other  surgeries per patient. Has regular menses. Patient voices no other  concerns at this time.          Past Medical History:     Past Medical History:   Diagnosis Date     Abnormal Pap smear of cervix 2016, 2017, 2018, 2019    see problem list     Allergic 08/20/2008     Anal fissure      Cervical high risk HPV (human papillomavirus) test positive 2016, 2018, 2019    See Problem List     H/O colposcopy with cervical biopsy 05/17/2017 05/17/17: [place] [facility] normal.      Personal history of other genital system and obstetric disorders(V13.29)      Spider veins             Past Surgical History:     Past Surgical History:   Procedure Laterality Date     BIOPSY  04/2013    moles removed by [facility] and biopsied; benign     COLONOSCOPY N/A 06/13/2022    Procedure: COLONOSCOPY, WITH POLYPECTOMY AND BIOPSY;  Surgeon: Joie Barraza MD;  Location: [facility] NONSPECIFIC PROCEDURE      wisdom teeth removed            Social History:     Social History     Tobacco Use     Smoking status: Never     Smokeless tobacco: Never   Vaping Use     Vaping status: Not on file   Substance Use Topics     Alcohol use: Yes     Comment: occasionaly            Family History:     Family History   Problem Relation Age of Onset     Diabetes Mother         Type II     Breast Cancer Mother         Diagnosed Aug 2012; double mast + chem; remiss.     Heart Failure Mother      Diabetes Father         Type II     Prostate Cancer Father      C.A.D. Maternal Grandmother      Cerebrovascular Disease Maternal Grandmother      Diabetes Maternal Uncle      Hypertension No family hx of      Cancer - colorectal No family hx of               Allergies:     Allergies   Allergen Reactions     Tree Nuts [Nuts] Anaphylaxis     Latex      Quinolones             Medications:     Current Outpatient Medications   Medication Sig     Ascorbic Acid (VITAMIN C PO) Take by mouth daily     Cholecalciferol (VITAMIN D PO) Take 1,000 Units by mouth daily     CRANBERRY PO Take by mouth daily     doxycycline hyclate (PERIOSTAT) 20 MG tablet  "TAKE 1 TABLET BY MOUTH TWICE A DAY     ferrous sulfate (FEROSUL) 325 (65 Fe) MG tablet Take 325 mg by mouth daily (with breakfast)     lactobacillus rhamnosus, GG, (CULTURELL) capsule Take 1 capsule by mouth 2 times daily     LORazepam (ATIVAN) 0.5 MG tablet 1-2 tablets once daily as needed for anxiety     nitroFURantoin macrocrystal-monohydrate (MACROBID) 100 MG capsule Take 1 capsule (100 mg) by mouth daily as needed (post coital antibiotic for UTI prevention)     ZYRTEC 10 MG OR TABS 1 TABLET DAILY     No current facility-administered medications for this visit.             Review of Systems:    ROS: 14 point ROS neg other than the symptoms noted above in the HPI.          Physical Exam:   Blood pressure 114/78, pulse 72, height 1.549 m (5' 1\"), weight 45.4 kg (100 lb), SpO2 98 %, not currently breastfeeding.  5' 1\", Body mass index is 18.89 kg/m ., 100 lbs 0 oz  Gen appearance: Age-appropriate appearing female in NAD.   HEENT:  EOMI, conjunctiva clear/white. Normal ROM of neck for age.   Psych:  alert , In no acute distress.  Neuro:  A&Ox3  Skin:  Clear of obvious rashes, ecchymoses.  Resp:  Normal respiratory effort; not in acute respiratory distress.   Vasc:  Regular rate.  lymph:  No obvious LE edema bilaterally.     exam:  Vulva is normal in appearance. Urethra normal.. Negative for ABDIEL with reduction of speculum when instructed to cough. No pain to palpation along internal or external exam. No prolapse appreciated. Strength 4/5. No obvious masses, lesions, ulcers, bleeding noted on internal or external exam.          Data:    PVR  31mL    Labs:  UA RESULTS:  Recent Labs   Lab Test 04/07/23  1131 03/03/23  0814   COLOR Yellow Yellow   APPEARANCE Clear Clear   URINEGLC Negative Negative   URINEBILI Negative Negative   URINEKETONE Negative Negative   SG 1.020 1.020   UBLD Negative Trace*   URINEPH 6.0 7.0   PROTEIN Negative Negative   UROBILINOGEN 0.2 0.2   NITRITE Negative Negative   LEUKEST Negative Trace* "   RBCU  --  2-5*   WBCU  --  10-25*         UC  3/3/23 50,000-100,000 Klebsiella pneumoniae with resistances  3/2/23 no growth  7/29/22 No growth     Imaging:  Abdominal US 7/2020  FINDINGS:  Ultrasound imaging in the area of patient's umbilical  region is performed. No evidence of umbilical hernia or other  abnormality to account for patient's symptoms. No fluid collection,  cyst or mass is appreciated. Sagittal imaging demonstrates the area of  pain overlying the anterior inferior margin of the left hepatic lobe.  No obvious abnormality on limited imaging through this area.                                                           IMPRESSION: No ultrasound abnormality or evidence of hernia in the  region of patient's periumbilical pain.

## 2023-04-07 NOTE — PATIENT INSTRUCTIONS
- Follow-up for cystoscopy  - - CT scan: Please call [phone] to schedule this at the [facility] ([place] or [facility] ([place]).      CYSTOSCOPY    What is a Cystoscopy?  This is a procedure done to check for problems inside the bladder.  Problems may include polyps (growths), tumors, inflammation (swelling and redness) and other concerns.    The doctor inserts a thin tube (called a cystoscope) into the bladder.  The tube is about the size of a pencil.  We will give you numbing medicine to reduce the pain or discomfort you may feel.    The tube allows the doctor to:  The doctor will be able to see inside the bladder by filling the bladder with water.  The water makes it easier to see any problems that may be present.    If needed, the doctor may use the tube to:  The doctor is able to take tissue samples (biopsies).  Samples are sent to the lab for testing.  The doctor can also burn off any small growths or tumors that are found.  This is call fulguration.    What happens after the exam?  You may go back to your normal diet and activity as you feel ready, unless your doctor tells you not to.    For the next two days, you may notice:  Some blood in your urine.  Some burning when you urinate (use the toilet).  An urge to urinate more often.  Bladder spasms.    These are normal after the procedure. They should go away on their own after a day or two.      You can help to relieve the above listed symptoms by:  Drinking 6 to 8 large glasses of water each day (includes drinks at meals).  This will help clear the urine.  Take warm baths to relieve pain and bladder spasms.  Do not add anything to the bath water.  Your doctor may prescribe pain medicine.  You may also take Tylenol (acetaminophen) for pain.    When should I call my doctor?  A fever over 100.0 F (38 C) for more than a day.  (Before you call the doctor, check your temperature under your  tongue.)  Chills.  Failure to urinate: No urine comes out when you try to use the toilet.  (Try soaking in a bathtub full of warm water.  If still no urine, call your doctor.)  A lot of blood in the urine or blood clots larger than a nickel.  Pain in the back or abdomen (belly / stomach area).  Pain or spasms that are not relieved by warm tub baths and pain medicine.  Severe pain, burning or other problems while passing urine.  Pain that gets worse after two days.

## 2023-04-10 ENCOUNTER — TELEPHONE (OUTPATIENT)
Dept: UROLOGY | Facility: CLINIC | Age: 48
End: 2023-04-10
Payer: COMMERCIAL

## 2023-04-10 NOTE — TELEPHONE ENCOUNTER
Called patient. She sent Mychart of her UTIs from other locations:    7/16/22 E. coli   7/23/22 E. coli; resistant to cephalosporin  7/29/22 No growth  9/1/22 Klebsiella pneumoniae and given fosfomycin      Discussed with patient to keep CT urogram and cystoscopy as planned. Discussed considering starting methenamine with vitamin C and stopping macrobid. Patient will plan on continuing macrdobid around time of coitus until seen for cysto. Discussed with patient consider switching to methenamine if UTIs persist or if becomes resistant to macrobid. Discussed risks of antibiotic resistance. Patient verbalized understanding. All questions answered.

## 2023-04-19 NOTE — PROGRESS NOTES
SUBJECTIVE:                                                   Jessie Karimi is a 47 year old female who presents to clinic today for the following health issue(s):  Patient presents with:  Consult: Recently found out partner has molluscum in genital area, patient has not noticed any bumps on herself but would like to discuss this.    HPI:  Patient here today to discuss molluscum contagiosum.  Her boyfriend was recently diagnosed with this and had a cryotherapy done to his lesions.  She states that he had lesions on his mons and the base of the penis.  She has not noticed any physical lesions.    Patient's last menstrual period was 2023.    Patient is sexually active, .  Using none for contraception.    reports that she has never smoked. She has never used smokeless tobacco.    STD testing offered?  Accepted    Health maintenance updated:  yes    Today's PHQ-2 Score:       3/1/2023    11:57 AM   PHQ-2 (  [employer])   Q1: Little interest or pleasure in doing things 0   Q2: Feeling down, depressed or hopeless 0   PHQ-2 Score 0   Q1: Little interest or pleasure in doing things Not at all   Q2: Feeling down, depressed or hopeless Not at all   PHQ-2 Score 0     Today's PHQ-9 Score:       2021     3:26 PM   PHQ-9 SCORE   PHQ-9 Total Score 0     Today's NICOLASA-7 Score:       2021     3:26 PM   NICOLASA-7 SCORE   Total Score 0       Problem list and histories reviewed & adjusted, as indicated.  Additional history: as documented.    Patient Active Problem List   Diagnosis     Other acne     Encounter for other general counseling or advice on contraception     Allergic     CARDIOVASCULAR SCREENING; LDL GOAL LESS THAN 160     Anxiety     Environmental allergies     Health Care Home     Tree nut allergy     Encounter for surveillance of contraceptive pills     ASCUS with positive high risk HPV cervical     Cervical high risk HPV (human papillomavirus) test positive     Annual physical exam     Cervical  cancer screening     Recurrent UTI     Screening for STD (sexually transmitted disease)     Perioral dermatitis     Past Surgical History:   Procedure Laterality Date     BIOPSY  04/2013    moles removed by [facility] and biopsied; benign     COLONOSCOPY N/A 06/13/2022    Procedure: COLONOSCOPY, WITH POLYPECTOMY AND BIOPSY;  Surgeon: Joie Barraza MD;  Location: [facility]     ZC NONSPECIFIC PROCEDURE      wisdom teeth removed      Social History     Tobacco Use     Smoking status: Never     Smokeless tobacco: Never   Vaping Use     Vaping status: Not on file   Substance Use Topics     Alcohol use: Yes     Comment: occasionaly      Problem (# of Occurrences) Relation (Name,Age of Onset)    Heart Failure (1) Mother (April)    Diabetes (3) Mother (April): Type II, Father (Minesh): Type II, Maternal Uncle    Cerebrovascular Disease (1) Maternal Grandmother    Breast Cancer (1) Mother (April): Diagnosed Aug 2012; double mast + chem; remiss.    Prostate Cancer (1) Father (Minesh)    C.A.D. (1) Maternal Grandmother       Negative family history of: Hypertension, Cancer - colorectal            Current Outpatient Medications   Medication Sig     Ascorbic Acid (VITAMIN C PO) Take by mouth daily     Cholecalciferol (VITAMIN D PO) Take 1,000 Units by mouth daily     CRANBERRY PO Take by mouth daily     doxycycline hyclate (PERIOSTAT) 20 MG tablet TAKE 1 TABLET BY MOUTH TWICE A DAY     ferrous sulfate (FEROSUL) 325 (65 Fe) MG tablet Take 325 mg by mouth daily (with breakfast)     lactobacillus rhamnosus, GG, (CULTURELL) capsule Take 1 capsule by mouth 2 times daily     LORazepam (ATIVAN) 0.5 MG tablet 1-2 tablets once daily as needed for anxiety     nitroFURantoin macrocrystal-monohydrate (MACROBID) 100 MG capsule Take 1 capsule (100 mg) by mouth daily as needed (post coital antibiotic for UTI prevention)     ZYRTEC 10 MG OR TABS 1 TABLET DAILY     No current facility-administered medications for this visit.     Allergies  "  Allergen Reactions     Tree Nuts [Nuts] Anaphylaxis     Latex      Quinolones        ROS:  12 point review of systems negative other than symptoms noted below or in the HPI.  No urinary frequency or dysuria, bladder or kidney problems      OBJECTIVE:     BP 98/60   Ht 1.549 m (5' 1\")   Wt 45.8 kg (101 lb)   LMP 04/16/2023   BMI 19.08 kg/m    Body mass index is 19.08 kg/m .    Exam:  Constitutional:  Appearance: Well nourished, well developed alert, in no acute distress  Psychiatric:  Mentation appears normal and affect normal/bright.  Pelvic Exam:  External Genitalia:     Normal appearance for age, no discharge present, no tenderness present, no inflammatory lesions present, color normal  Vagina:     Normal vaginal vault without central or paravaginal defects, no discharge present, no inflammatory lesions present, no masses present  Urethra:   Urethral Meatus:  No erythema or lesions present  Perineum:     Perineum within normal limits, no evidence of trauma, no rashes or skin lesions present  Anus:     Anus within normal limits, no hemorrhoids present  Inguinal Lymph Nodes:     No lymphadenopathy present  Pubic Hair:     Normal pubic hair distribution for age  Genitalia and Groin:     No rashes present, no lesions present, no areas of discoloration, no masses present       In-Clinic Test Results:  No results found for this or any previous visit (from the past 24 hour(s)).    ASSESSMENT/PLAN:                                                        ICD-10-CM    1. Worried well  Z71.1           There are no Patient Instructions on file for this visit.    47-year-old female with no evidence of molluscum contagiosum.  We did discuss the transmission of 2 to 6 weeks.  She will continue to monitor and call if she has any lesions.  We did discuss TCA treatment if needed.    GEORGES Patel CNP  [facility] [place]  "

## 2023-04-20 ENCOUNTER — OFFICE VISIT (OUTPATIENT)
Dept: OBGYN | Facility: CLINIC | Age: 48
End: 2023-04-20
Payer: COMMERCIAL

## 2023-04-20 ENCOUNTER — PRE VISIT (OUTPATIENT)
Dept: ORTHOPEDICS | Facility: CLINIC | Age: 48
End: 2023-04-20

## 2023-04-20 VITALS
BODY MASS INDEX: 19.07 KG/M2 | SYSTOLIC BLOOD PRESSURE: 98 MMHG | HEIGHT: 61 IN | WEIGHT: 101 LBS | DIASTOLIC BLOOD PRESSURE: 60 MMHG

## 2023-04-20 DIAGNOSIS — Z71.1 WORRIED WELL: Primary | ICD-10-CM

## 2023-04-20 PROCEDURE — 99212 OFFICE O/P EST SF 10 MIN: CPT | Performed by: NURSE PRACTITIONER

## 2023-04-25 ENCOUNTER — OFFICE VISIT (OUTPATIENT)
Dept: URGENT CARE | Facility: URGENT CARE | Age: 48
End: 2023-04-25
Payer: COMMERCIAL

## 2023-04-25 VITALS
OXYGEN SATURATION: 100 % | RESPIRATION RATE: 16 BRPM | DIASTOLIC BLOOD PRESSURE: 87 MMHG | BODY MASS INDEX: 19.07 KG/M2 | SYSTOLIC BLOOD PRESSURE: 125 MMHG | TEMPERATURE: 97.8 F | HEART RATE: 77 BPM | HEIGHT: 61 IN | WEIGHT: 101 LBS

## 2023-04-25 DIAGNOSIS — U07.1 COVID-19 VIRUS INFECTION: Primary | ICD-10-CM

## 2023-04-25 LAB — DEPRECATED S PYO AG THROAT QL EIA: NEGATIVE

## 2023-04-25 PROCEDURE — 99213 OFFICE O/P EST LOW 20 MIN: CPT | Performed by: EMERGENCY MEDICINE

## 2023-04-25 PROCEDURE — 87651 STREP A DNA AMP PROBE: CPT | Performed by: EMERGENCY MEDICINE

## 2023-04-25 NOTE — PROGRESS NOTES
"Assessment & Plan     Diagnosis:      Medical Decision Making:  Jessie Karimi is a 47 year old female who presents for evaluation of     This is consistent with an upper respiratory tract infection.  There is no signs at this point of serious bacterial infection such as OM, RPA, epiglottitis, PTA, strep pharyngitis, pneumonia***, meningitis, bacteremia, serious bacterial infection.      ***Given clear lungs, no fever fever curve***, no hypoxia and no respiratory distress I do not feel the patient needs a CXR at this point as the probability of bacterial pneumonia is very unlikely.       OR    ***Given *** , I did a CXR to eval for pneumonia. This shows ***. There are no signs of complications of pneumonia at this point such as septic shock, bacteremia, empyema, hypoxia, respiratory failure or compromise.       There are *** gastrointestinal symptoms at this point and no signs of dehydration.  Close followup with PCP is indicated.  Go to ED for fever > 102 F, protracted vomiting, worsening shortness of breath, chest pain*** or other concerns.  Patient's *** verbalized understanding and agreement with the plan was discharged in stable condition..     Patient voices understanding and agreement with the plan including reasons to go to the ER immediately as well as to be seen by a more consistent care-giver, such as their PCP, if the symptoms persist more than *** days.       {2021 E&M time (Optional):945800}      Solo Ambriz PA-C  [facility] URGENT CARE    Subjective     Jessie Karimi is a 47 year old female who presents to clinic today for the following health issues:  Chief Complaint   Patient presents with     Urgent Care     Covid Concern     Tested positive today for Covid, exposed last week, also exposed to strep. Headache started Sat, Sore throat on Sunday.        HPI  {UC Conditions (Optional):597345}    Patient describes the symptoms as \"***\"     Patient denies any ***.     Review of " "Systems    See HPI    Objective      Vitals: Pulse 77   Temp 97.8  F (36.6  C) (Temporal)   Resp 16   Ht 1.549 m (5' 1\")   Wt 45.8 kg (101 lb)   LMP 04/16/2023   SpO2 100%   BMI 19.08 kg/m    Resp: ***    Patient Vitals for the past 24 hrs:   Temp Temp src Pulse Resp SpO2 Height Weight   04/25/23 1618 97.8  F (36.6  C) Temporal 77 16 100 % 1.549 m (5' 1\") 45.8 kg (101 lb)       Vital signs reviewed by: Solo Ambriz PA-C    Physical Exam   Constitutional: Patient is alert and cooperative. No acute distress***.  Ears: Right TM is ***. Left TM is ***. External ear canals are normal.  Mouth: Mucous membranes are moist. Normal tongue and tonsil. Posterior oropharynx is clear.  Eyes: Conjunctivae, EOMI and lids are normal. PERRL.  Cardiovascular: Regular rate and rhythm***  Pulmonary/Chest: Lungs are clear to auscultation throughout. Effort normal. No respiratory distress. No wheezes, rales or rhonchi.  GI: Abdomen is soft and non-tender throughout.*** No CVA tenderness bilaterally***.  Neurological: Alert and oriented x3. CN 3-7 and 9-12 intact. Strength and sensation are intact and symmetric in the bilateral upper and lower extremities.   Skin: No rash noted on visualized skin.  Psychiatric:The patient has a normal mood and affect.     Labs/Imaging:  Results for orders placed or performed in visit on 04/25/23   Streptococcus A Rapid Screen w/Reflex to PCR - Clinic Collect     Status: Normal    Specimen: Throat; Swab   Result Value Ref Range    Group A Strep antigen Negative Negative         Solo Ambriz PA-C, April 25, 2023      "

## 2023-04-25 NOTE — PROGRESS NOTES
Assessment & Plan     Diagnosis:  (U07.1) COVID-19 virus infection  (primary encounter diagnosis)  Plan: Streptococcus A Rapid Screen w/Reflex to PCR -         Clinic Collect, Group A Streptococcus PCR         Throat Swab      Medical Decision Making:  Jessie Karimi is a 47 year old female who presents for evaluation of cough, sore throat, rhinorrhea. Patient tested positive for COVID this morning, also has a strep throat exposure.  Rapid strep was negative with PCR pending at this time.  Patient overall is very well-appearing here with no concerning respiratory symptoms.     Given clear lungs, no fever, no hypoxia and no respiratory distress I do not feel the patient needs a CXR at this point as the probability of bacterial pneumonia is very unlikely.  I see no evidence of bacterial pneumonia, myocarditis or acute CHF.  Doubt pulmonary embolism or ACS. Doubt serious bacterial infection and I would not do basic labs nor blood cultures.    Discussed antiviral treatment with the patient after shared decision making she declines which I believe is reasonable given her well immunized status, well appearance and lack of risk factors and the fact that she is improving and symptoms are nearly resolved.    There are no gastrointestinal symptoms at this point and no signs of dehydration.  Close followup with PCP is indicated.  Go to ED for fever > 102 F, protracted vomiting, worsening shortness of breath, chest pain or other concerns.  Patient verbalized understanding and agreement with the plan was discharged in stable condition.    Solo Ambriz PA-C  [facility] URGENT CARE    Subjective     Jessie Karimi is a 47 year old female who presents to clinic today for the following health issues:  Chief Complaint   Patient presents with     Urgent Care     Covid Concern     Tested positive today for Covid, exposed last week, also exposed to strep. Headache started Sat, Sore throat on Sunday.        HPI  Patient  "states that 3 days ago she started experiencing a sore throat, headache, slight cough and fatigue.  She was exposed to strep and COVID last week, tested positive for COVID this morning.  He notes her symptoms have improved greatly, she not having any shortness of breath, leg swelling or calf pain, difficulty swallowing or breathing, chest pain, worsening sore throat, productive cough, fevers or other concerns.    Review of Systems    See HPI    Objective      Vitals: /87   Pulse 77   Temp 97.8  F (36.6  C) (Temporal)   Resp 16   Ht 1.549 m (5' 1\")   Wt 45.8 kg (101 lb)   LMP 04/16/2023   SpO2 100%   BMI 19.08 kg/m        Patient Vitals for the past 24 hrs:   BP Temp Temp src Pulse Resp SpO2 Height Weight   04/25/23 1618 125/87 97.8  F (36.6  C) Temporal 77 16 100 % 1.549 m (5' 1\") 45.8 kg (101 lb)       Vital signs reviewed by: Solo Ambriz PA-C    Physical Exam   Constitutional: Patient is alert and cooperative. No acute distress.  Ears: Right TM is normal. Left TM is normal. External ear canals are normal.  Mouth: Mucous membranes are moist. Normal tongue and tonsil. Posterior oropharynx is slightly erythematous.  No exudates.  Uvula midline.  Cardiovascular: Regular rate and rhythm  Pulmonary/Chest: Lungs are clear to auscultation throughout. Effort normal. No respiratory distress. No wheezes, rales or rhonchi.  GI: Abdomen is soft and non-tender throughout.  Neurological: Alert and oriented x3.   Skin: No rash noted on visualized skin.  Psychiatric:The patient has a normal mood and affect.     Labs/Imaging:  Results for orders placed or performed in visit on 04/25/23   Streptococcus A Rapid Screen w/Reflex to PCR - Clinic Collect     Status: Normal    Specimen: Throat; Swab   Result Value Ref Range    Group A Strep antigen Negative Negative         Solo Ambriz PA-C, April 25, 2023      "

## 2023-04-26 LAB — GROUP A STREP BY PCR: NOT DETECTED

## 2023-05-01 DIAGNOSIS — M25.551 RIGHT HIP PAIN: Primary | ICD-10-CM

## 2023-05-04 ENCOUNTER — ANCILLARY PROCEDURE (OUTPATIENT)
Dept: GENERAL RADIOLOGY | Facility: CLINIC | Age: 48
End: 2023-05-04
Attending: STUDENT IN AN ORGANIZED HEALTH CARE EDUCATION/TRAINING PROGRAM
Payer: COMMERCIAL

## 2023-05-04 ENCOUNTER — OFFICE VISIT (OUTPATIENT)
Dept: ORTHOPEDICS | Facility: CLINIC | Age: 48
End: 2023-05-04
Payer: COMMERCIAL

## 2023-05-04 VITALS — HEIGHT: 61 IN | WEIGHT: 101 LBS | BODY MASS INDEX: 19.07 KG/M2

## 2023-05-04 DIAGNOSIS — M25.551 RIGHT HIP PAIN: ICD-10-CM

## 2023-05-04 DIAGNOSIS — M76.01 GLUTEAL TENDINITIS OF RIGHT BUTTOCK: ICD-10-CM

## 2023-05-04 DIAGNOSIS — M79.18 PIRIFORMIS MUSCLE PAIN: ICD-10-CM

## 2023-05-04 DIAGNOSIS — M25.551 RIGHT HIP PAIN: Primary | ICD-10-CM

## 2023-05-04 PROCEDURE — 73502 X-RAY EXAM HIP UNI 2-3 VIEWS: CPT | Mod: RT | Performed by: RADIOLOGY

## 2023-05-04 PROCEDURE — 99203 OFFICE O/P NEW LOW 30 MIN: CPT | Mod: GC | Performed by: STUDENT IN AN ORGANIZED HEALTH CARE EDUCATION/TRAINING PROGRAM

## 2023-05-04 NOTE — PROGRESS NOTES
[facility]  Sports Medicine Clinic  [facility]         SUBJECTIVE       Jessie Karimi is a 47 year old female presenting to clinic today with:    Right hip pain- she points to the lateral aspect of her hip. She describes her pain as a pinching sensation but will not radiate into her right lower leg.     She attends [employer] classes everyday. She will only experience increased pain when she has a flare up. She does enjoy going for walks outside. Exercise has not aggravated her symptoms.     She reports not history of low back pain or injury. She denies tingling, numbness or burning sensations.     Background:   Date of injury: None   Duration of symptoms: 6-8 months  Mechanism of Injury: unknown   Intensity: 8/10 at worst. She gives an example that when she was on vacation in [place] and had to sleep on a hotel mattress, she had a flare up after 3 days.   Aggravating factors: sitting for an extended period of time, sitting at an incline such as at her desk, laying on her right side at night time.   Relieving Factors: focus on proper posture, not crossing her legs, ibuprofen or Aleve prn, and rest.   Prior Evaluation: None.     - when not in a flare has no issues  - the only thing that flares it is sleeping on that side on a poor mattress      PMH, Medications and Allergies were reviewed and updated as needed.    ROS:  As noted above otherwise negative.    Patient Active Problem List   Diagnosis     Other acne     Encounter for other general counseling or advice on contraception     Allergic     CARDIOVASCULAR SCREENING; LDL GOAL LESS THAN 160     Anxiety     Environmental allergies     Health Care Home     Tree nut allergy     Encounter for surveillance of contraceptive pills     ASCUS with positive high risk HPV cervical     Cervical high risk HPV (human papillomavirus) test positive     Annual physical exam     Cervical cancer screening     Recurrent UTI     Screening for STD  "(sexually transmitted disease)     Perioral dermatitis       Current Outpatient Medications   Medication Sig Dispense Refill     Ascorbic Acid (VITAMIN C PO) Take by mouth daily       Cholecalciferol (VITAMIN D PO) Take 1,000 Units by mouth daily       CRANBERRY PO Take by mouth daily       doxycycline hyclate (PERIOSTAT) 20 MG tablet TAKE 1 TABLET BY MOUTH TWICE A  tablet 1     ferrous sulfate (FEROSUL) 325 (65 Fe) MG tablet Take 325 mg by mouth daily (with breakfast)       lactobacillus rhamnosus, GG, (CULTURELL) capsule Take 1 capsule by mouth 2 times daily       LORazepam (ATIVAN) 0.5 MG tablet 1-2 tablets once daily as needed for anxiety 12 tablet 0     nitroFURantoin macrocrystal-monohydrate (MACROBID) 100 MG capsule Take 1 capsule (100 mg) by mouth daily as needed (post coital antibiotic for UTI prevention) 30 capsule 3     ZYRTEC 10 MG OR TABS 1 TABLET DAILY 30 0            OBJECTIVE:       Vitals:   Vitals:    05/04/23 1540   Weight: 45.8 kg (101 lb)   Height: 1.549 m (5' 1\")     BMI: Body mass index is 19.08 kg/m .    Gen:  Well nourished and in no acute distress  HEENT: Extraocular movement intact  Neck: Supple  Pulm:  Breathing Comfortably. No increased respiratory effort.  Psych: Euthymic. Appropriately answers questions      Musculoskeletal - RIGHT hip  - stance: no obvious leg length discrepancy, normal heel and toe walk  - inspection: no swelling or effusion, normal bone and joint alignment, no obvious deformity  - palpation: ttp gluteal muscles, piriformis. no tenderness at the ASIS, anterior hip flexors, greater trochanter, ITB, hamstring/ischial tuberosity  - ROM: Full flexion, extension, internal, external rotation, abduction, adduction in passive and active without pain but with some discomfort at the lateral outer hip on resisted ER and deep flexion  - strength: 5/5 in all planes above  - special tests: negative Trendelenberg, MIRIAN, FADIR, Scour, Stichfield, Calvin, Jared  - pain with " "resisted right clamshell and resisted piriformis ROM  Neuro  - no sensory or motor deficit, grossly normal coordination, normal muscle tone  Skin  - no ecchymosis, erythema, warmth, or induration, no obvious rash      XRAY Right Hip (5/4/23):    IMAGING: Final results and radiologist's interpretation available in the [facility] health record. Images were reviewed with the patient/family members in the office today.     My personal interpretation of the performed imaging is: unremarkable, no loss of joint space         ASSESSMENT and PLAN:     Jessie was seen today for consult.    Diagnoses and all orders for this visit:    Right hip pain  -     Physical Therapy Referral; Future    Gluteal tendinitis of right buttock  -     Physical Therapy Referral; Future    Piriformis muscle pain  -     Physical Therapy Referral; Future      47 yoF with intermittent waxing and waning right hip pain over past 6-8 months that has singularly been flared by sleeping on right side on \"bad mattresses\". Exam c/w mild piriformis/glute tendinosis (patient reports significant improvement from most recent flare). Shared decision making to see PT for strenghening/proprioceptive training and will touch base during next flare. Encouraged NSAIDs/Tyl, Heat/ice prn.     Options for treatment and/or follow-up care were reviewed with the patient was actively involved in the decision making process. Patient verbalized understanding and was in agreement with the plan.    The patient was seen by and discussed with attending physician Dr. Adonay Faith, , CAQ who agrees with the plan unless otherwise stated.     Siobhan Conroy DO  Primary Care Sports Medicine Fellow  [facility]    "

## 2023-05-04 NOTE — LETTER
5/4/2023      RE: Jessie Karimi  [address]  [facility] [address]     Dear Colleague,    Thank you for referring your patient, Jessie Karimi, to the [facility] SPORTS MEDICINE CLINIC [place]. Please see a copy of my visit note below.      [facility]  Sports Medicine Clinic  Clinics and Surgery Center         SUBJECTIVE       Jessie Karimi is a 47 year old female presenting to clinic today with:    Right hip pain- she points to the lateral aspect of her hip. She describes her pain as a pinching sensation but will not radiate into her right lower leg.     She attends [employer] classes everyday. She will only experience increased pain when she has a flare up. She does enjoy going for walks outside. Exercise has not aggravated her symptoms.     She reports not history of low back pain or injury. She denies tingling, numbness or burning sensations.     Background:   Date of injury: None   Duration of symptoms: 6-8 months  Mechanism of Injury: unknown   Intensity: 8/10 at worst. She gives an example that when she was on vacation in [place] and had to sleep on a hotel mattress, she had a flare up after 3 days.   Aggravating factors: sitting for an extended period of time, sitting at an incline such as at her desk, laying on her right side at night time.   Relieving Factors: focus on proper posture, not crossing her legs, ibuprofen or Aleve prn, and rest.   Prior Evaluation: None.     - when not in a flare has no issues  - the only thing that flares it is sleeping on that side on a poor mattress      PMH, Medications and Allergies were reviewed and updated as needed.    ROS:  As noted above otherwise negative.    Patient Active Problem List   Diagnosis    Other acne    Encounter for other general counseling or advice on contraception    Allergic    CARDIOVASCULAR SCREENING; LDL GOAL LESS THAN 160    Anxiety    Environmental allergies    Health Care Home    Tree nut allergy     "Encounter for surveillance of contraceptive pills    ASCUS with positive high risk HPV cervical    Cervical high risk HPV (human papillomavirus) test positive    Annual physical exam    Cervical cancer screening    Recurrent UTI    Screening for STD (sexually transmitted disease)    Perioral dermatitis       Current Outpatient Medications   Medication Sig Dispense Refill    Ascorbic Acid (VITAMIN C PO) Take by mouth daily      Cholecalciferol (VITAMIN D PO) Take 1,000 Units by mouth daily      CRANBERRY PO Take by mouth daily      doxycycline hyclate (PERIOSTAT) 20 MG tablet TAKE 1 TABLET BY MOUTH TWICE A  tablet 1    ferrous sulfate (FEROSUL) 325 (65 Fe) MG tablet Take 325 mg by mouth daily (with breakfast)      lactobacillus rhamnosus, GG, (CULTURELL) capsule Take 1 capsule by mouth 2 times daily      LORazepam (ATIVAN) 0.5 MG tablet 1-2 tablets once daily as needed for anxiety 12 tablet 0    nitroFURantoin macrocrystal-monohydrate (MACROBID) 100 MG capsule Take 1 capsule (100 mg) by mouth daily as needed (post coital antibiotic for UTI prevention) 30 capsule 3    ZYRTEC 10 MG OR TABS 1 TABLET DAILY 30 0            OBJECTIVE:       Vitals:   Vitals:    05/04/23 1540   Weight: 45.8 kg (101 lb)   Height: 1.549 m (5' 1\")     BMI: Body mass index is 19.08 kg/m .    Gen:  Well nourished and in no acute distress  HEENT: Extraocular movement intact  Neck: Supple  Pulm:  Breathing Comfortably. No increased respiratory effort.  Psych: Euthymic. Appropriately answers questions      Musculoskeletal - RIGHT hip  - stance: no obvious leg length discrepancy, normal heel and toe walk  - inspection: no swelling or effusion, normal bone and joint alignment, no obvious deformity  - palpation: ttp gluteal muscles, piriformis. no tenderness at the ASIS, anterior hip flexors, greater trochanter, ITB, hamstring/ischial tuberosity  - ROM: Full flexion, extension, internal, external rotation, abduction, adduction in passive and " "active without pain but with some discomfort at the lateral outer hip on resisted ER and deep flexion  - strength: 5/5 in all planes above  - special tests: negative Trendelenberg, MIRIAN, FADIR, Scour, Stichfield, Calvin, Jared  - pain with resisted right clamshell and resisted piriformis ROM  Neuro  - no sensory or motor deficit, grossly normal coordination, normal muscle tone  Skin  - no ecchymosis, erythema, warmth, or induration, no obvious rash      XRAY Right Hip (5/4/23):    IMAGING: Final results and radiologist's interpretation available in the [facility] health record. Images were reviewed with the patient/family members in the office today.     My personal interpretation of the performed imaging is: unremarkable, no loss of joint space         ASSESSMENT and PLAN:     Jessie was seen today for consult.    Diagnoses and all orders for this visit:    Right hip pain  -     Physical Therapy Referral; Future    Gluteal tendinitis of right buttock  -     Physical Therapy Referral; Future    Piriformis muscle pain  -     Physical Therapy Referral; Future      47 yoF with intermittent waxing and waning right hip pain over past 6-8 months that has singularly been flared by sleeping on right side on \"bad mattresses\". Exam c/w mild piriformis/glute tendinosis (patient reports significant improvement from most recent flare). Shared decision making to see PT for strenghening/proprioceptive training and will touch base during next flare. Encouraged NSAIDs/Tyl, Heat/ice prn.     Options for treatment and/or follow-up care were reviewed with the patient was actively involved in the decision making process. Patient verbalized understanding and was in agreement with the plan.    The patient was seen by and discussed with attending physician Dr. Adonay Faith, , CAQ who agrees with the plan unless otherwise stated.     Siobhan Conroy DO  Primary Care Sports Medicine Fellow  [facility]      Preceptor Attestation:    I " discussed the patient with the fellow and evaluated the patient in person. I have verified the content of the note, which accurately reflects my assessment of the patient and the plan of care.   Supervising Physician:  Kamran Faith DO.        Again, thank you for allowing me to participate in the care of your patient.      Sincerely,    Siobhan Conroy MD

## 2023-05-05 NOTE — PROGRESS NOTES
Preceptor Attestation:    I discussed the patient with the fellow and evaluated the patient in person. I have verified the content of the note, which accurately reflects my assessment of the patient and the plan of care.   Supervising Physician:  Kamran Faith DO.

## 2023-05-08 ENCOUNTER — ALLIED HEALTH/NURSE VISIT (OUTPATIENT)
Dept: UROLOGY | Facility: CLINIC | Age: 48
End: 2023-05-08
Payer: COMMERCIAL

## 2023-05-08 DIAGNOSIS — N39.0 RECURRENT UTI: Primary | ICD-10-CM

## 2023-05-08 LAB
ALBUMIN UR-MCNC: NEGATIVE MG/DL
APPEARANCE UR: CLEAR
BILIRUB UR QL STRIP: NEGATIVE
COLOR UR AUTO: YELLOW
GLUCOSE UR STRIP-MCNC: NEGATIVE MG/DL
HGB UR QL STRIP: NEGATIVE
KETONES UR STRIP-MCNC: NEGATIVE MG/DL
LEUKOCYTE ESTERASE UR QL STRIP: NEGATIVE
NITRATE UR QL: NEGATIVE
PH UR STRIP: 6 [PH] (ref 5–7)
SP GR UR STRIP: 1.01 (ref 1–1.03)
UROBILINOGEN UR STRIP-ACNC: 0.2 E.U./DL

## 2023-05-08 PROCEDURE — 99207 PR NO CHARGE NURSE ONLY: CPT

## 2023-05-08 PROCEDURE — 81003 URINALYSIS AUTO W/O SCOPE: CPT | Mod: QW

## 2023-05-08 PROCEDURE — 87086 URINE CULTURE/COLONY COUNT: CPT

## 2023-05-08 NOTE — PROGRESS NOTES
Jessie Karimi comes into clinic today at the request of Maria A Rock PAC Ordering Provider for [facility].  This service provided today was under the supervising provider of the day Leda Lopez PAC, who was available if needed.  Cath UA/UC collected,sent to lab.  Naida Mcdermott LPN

## 2023-05-10 LAB — BACTERIA UR CULT: NO GROWTH

## 2023-05-11 ENCOUNTER — HOSPITAL ENCOUNTER (OUTPATIENT)
Dept: CT IMAGING | Facility: CLINIC | Age: 48
Discharge: HOME | End: 2023-05-11
Attending: PHYSICIAN ASSISTANT | Admitting: PHYSICIAN ASSISTANT
Payer: COMMERCIAL

## 2023-05-11 DIAGNOSIS — N39.0 RECURRENT UTI: ICD-10-CM

## 2023-05-11 PROCEDURE — 74178 CT ABD&PLV WO CNTR FLWD CNTR: CPT

## 2023-05-11 PROCEDURE — 250N000009 HC RX 250: Performed by: PHYSICIAN ASSISTANT

## 2023-05-11 PROCEDURE — 250N000011 HC RX IP 250 OP 636: Performed by: PHYSICIAN ASSISTANT

## 2023-05-11 RX ORDER — IOPAMIDOL 755 MG/ML
100 INJECTION, SOLUTION INTRAVASCULAR ONCE
Status: COMPLETED | OUTPATIENT
Start: 2023-05-11 | End: 2023-05-11

## 2023-05-11 RX ADMIN — SODIUM CHLORIDE 100 ML: 9 INJECTION, SOLUTION INTRAVENOUS at 14:27

## 2023-05-11 RX ADMIN — IOPAMIDOL 100 ML: 755 INJECTION, SOLUTION INTRAVENOUS at 14:26

## 2023-05-12 ENCOUNTER — OFFICE VISIT (OUTPATIENT)
Dept: OBGYN | Facility: CLINIC | Age: 48
End: 2023-05-12
Payer: COMMERCIAL

## 2023-05-12 DIAGNOSIS — Z01.812 PRE-PROCEDURE LAB EXAM: ICD-10-CM

## 2023-05-12 DIAGNOSIS — Z11.3 SCREEN FOR STD (SEXUALLY TRANSMITTED DISEASE): Primary | ICD-10-CM

## 2023-05-12 PROCEDURE — 36415 COLL VENOUS BLD VENIPUNCTURE: CPT | Performed by: NURSE PRACTITIONER

## 2023-05-12 PROCEDURE — 86696 HERPES SIMPLEX TYPE 2 TEST: CPT | Performed by: NURSE PRACTITIONER

## 2023-05-12 PROCEDURE — 86695 HERPES SIMPLEX TYPE 1 TEST: CPT | Performed by: NURSE PRACTITIONER

## 2023-05-12 PROCEDURE — 99213 OFFICE O/P EST LOW 20 MIN: CPT | Performed by: NURSE PRACTITIONER

## 2023-05-12 NOTE — PROGRESS NOTES
SUBJECTIVE:                                                   Jessie Karimi is a 47 year old female who presents to clinic today for the following health issue(s):  Patient presents with:  screen std      HPI:  Patient here today requesting repeat HSV serum testing.  She was positive without symptoms or outbreaks.    Patient's last menstrual period was 2023..     Patient is sexually active, .  Using vasectomy for contraception.    reports that she has never smoked. She has never used smokeless tobacco.    STD testing offered?  Accepted    Health maintenance updated:  no    Today's PHQ-2 Score:       3/1/2023    11:57 AM   PHQ-2 (  [employer])   Q1: Little interest or pleasure in doing things 0   Q2: Feeling down, depressed or hopeless 0   PHQ-2 Score 0   Q1: Little interest or pleasure in doing things Not at all   Q2: Feeling down, depressed or hopeless Not at all   PHQ-2 Score 0     Today's PHQ-9 Score:       2021     3:26 PM   PHQ-9 SCORE   PHQ-9 Total Score 0     Today's NICOLASA-7 Score:       2021     3:26 PM   NICOLASA-7 SCORE   Total Score 0       Problem list and histories reviewed & adjusted, as indicated.  Additional history: as documented.    Patient Active Problem List   Diagnosis     Other acne     Encounter for other general counseling or advice on contraception     Allergic     CARDIOVASCULAR SCREENING; LDL GOAL LESS THAN 160     Anxiety     Environmental allergies     Health Care Home     Tree nut allergy     Encounter for surveillance of contraceptive pills     ASCUS with positive high risk HPV cervical     Cervical high risk HPV (human papillomavirus) test positive     Annual physical exam     Cervical cancer screening     Recurrent UTI     Screening for STD (sexually transmitted disease)     Perioral dermatitis     Past Surgical History:   Procedure Laterality Date     BIOPSY  2013    moles removed by Naz and biopsied; benign     COLONOSCOPY N/A 2022    Procedure:  COLONOSCOPY, WITH POLYPECTOMY AND BIOPSY;  Surgeon: Joie Barraza MD;  Location: [facility] NONSPECIFIC PROCEDURE      wisdom teeth removed      Social History     Tobacco Use     Smoking status: Never     Smokeless tobacco: Never   Vaping Use     Vaping status: Not on file   Substance Use Topics     Alcohol use: Yes     Comment: occasionaly      Problem (# of Occurrences) Relation (Name,Age of Onset)    Heart Failure (1) Mother (April)    Diabetes (3) Mother (April): Type II, Father (Minesh): Type II, Maternal Uncle    Cerebrovascular Disease (1) Maternal Grandmother    Breast Cancer (1) Mother (April): Diagnosed Aug 2012; double mast + chem; remiss.    Prostate Cancer (1) Father (Minesh)    C.A.D. (1) Maternal Grandmother       Negative family history of: Hypertension, Cancer - colorectal            Current Outpatient Medications   Medication Sig     Ascorbic Acid (VITAMIN C PO) Take by mouth daily     Cholecalciferol (VITAMIN D PO) Take 1,000 Units by mouth daily     CRANBERRY PO Take by mouth daily     doxycycline hyclate (PERIOSTAT) 20 MG tablet TAKE 1 TABLET BY MOUTH TWICE A DAY     ferrous sulfate (FEROSUL) 325 (65 Fe) MG tablet Take 325 mg by mouth daily (with breakfast)     lactobacillus rhamnosus, GG, (CULTURELL) capsule Take 1 capsule by mouth 2 times daily     LORazepam (ATIVAN) 0.5 MG tablet 1-2 tablets once daily as needed for anxiety     ZYRTEC 10 MG OR TABS 1 TABLET DAILY     nitroFURantoin macrocrystal-monohydrate (MACROBID) 100 MG capsule Take 1 capsule (100 mg) by mouth daily as needed (post coital antibiotic for UTI prevention) (Patient not taking: Reported on 5/12/2023)     No current facility-administered medications for this visit.     Allergies   Allergen Reactions     Tree Nuts [Nuts] Anaphylaxis     Latex      Quinolones        ROS:  12 point review of systems negative other than symptoms noted below or in the HPI.  No urinary frequency or dysuria, bladder or kidney  problems      OBJECTIVE:     LMP 04/16/2023   There is no height or weight on file to calculate BMI.    Exam:  Constitutional:  Appearance: Well nourished, well developed alert, in no acute distress  Psychiatric:  Mentation appears normal and affect normal/bright.     In-Clinic Test Results:  Results for orders placed or performed during the hospital encounter of 05/11/23 (from the past 24 hour(s))   CT Urogram wo & w Contrast    Narrative    CT ABDOMEN AND PELVIS WITHOUT AND WITH CONTRAST 5/11/2023 2:43 PM     HISTORY: Recurrent urinary tract infection.    COMPARISON: None.    TECHNIQUE: Volumetric helical acquisition of CT images from the lung  bases through the symphysis pubis before and after the uneventful  administration of 100mL Isovue-370 intravenous contrast. Radiation  dose for this scan was reduced using automated exposure control,  adjustment of the mA and/or kV according to patient size, or iterative  reconstruction technique.    FINDINGS: There are no stones seen within either kidney, either  ureter, or the bladder. There is no hydroureter or hydronephrosis.  There is no perinephric fat stranding. Kidneys are normal in size and  configuration. No suspicious filling defects seen in the opacified  intrarenal collecting systems, ureters, or bladder to suggest a  urothelial malignancy. The liver, spleen, adrenal glands, and pancreas  demonstrate no worrisome focal lesion. No free fluid. No free air in  the abdomen. There are no abdominal or pelvic lymph nodes that are  abnormal by size criteria. There are no dilated loops of small  intestine or large bowel to suggest ileus or obstruction.The  visualized lung bases are unremarkable. Bone windows reveal no  destructive lesions.       Impression    IMPRESSION:   1. No evidence for renal, ureteral, or bladder calculi.  2. No masses or suspicious filling defects within the opacified  urinary collecting system.    LORA YUNG MD         SYSTEM ID:  [id number]        ASSESSMENT/PLAN:                                                        ICD-10-CM    1. Screen for STD (sexually transmitted disease)  Z11.3 Herpes Simplex Virus 1 and 2 IgG     Herpes Simplex Virus 1 and 2 IgG      2. Pre-procedure lab exam  Z01.812 CANCELED: HCG qualitative urine          There are no Patient Instructions on file for this visit.    47-year-old female with a positive HSV serum blood test in 2021.  Repeat testing was done today.    GEORGES Patel CNP  [facility] [place]

## 2023-05-15 LAB
HSV1 IGG SERPL QL IA: 14.4 INDEX
HSV1 IGG SERPL QL IA: ABNORMAL
HSV2 IGG SERPL QL IA: 0.15 INDEX
HSV2 IGG SERPL QL IA: ABNORMAL

## 2023-05-17 ENCOUNTER — OFFICE VISIT (OUTPATIENT)
Dept: UROLOGY | Facility: CLINIC | Age: 48
End: 2023-05-17
Payer: COMMERCIAL

## 2023-05-17 VITALS
HEIGHT: 62 IN | BODY MASS INDEX: 18.4 KG/M2 | DIASTOLIC BLOOD PRESSURE: 74 MMHG | HEART RATE: 81 BPM | SYSTOLIC BLOOD PRESSURE: 108 MMHG | OXYGEN SATURATION: 100 % | WEIGHT: 100 LBS

## 2023-05-17 DIAGNOSIS — M62.89 HIGH-TONE PELVIC FLOOR DYSFUNCTION: ICD-10-CM

## 2023-05-17 DIAGNOSIS — R39.15 URINARY URGENCY: ICD-10-CM

## 2023-05-17 DIAGNOSIS — N39.0 RECURRENT UTI: Primary | ICD-10-CM

## 2023-05-17 DIAGNOSIS — R35.0 URINARY FREQUENCY: ICD-10-CM

## 2023-05-17 LAB
ALBUMIN UR-MCNC: NEGATIVE MG/DL
APPEARANCE UR: CLEAR
BILIRUB UR QL STRIP: NEGATIVE
COLOR UR AUTO: YELLOW
GLUCOSE UR STRIP-MCNC: NEGATIVE MG/DL
HGB UR QL STRIP: ABNORMAL
KETONES UR STRIP-MCNC: NEGATIVE MG/DL
LEUKOCYTE ESTERASE UR QL STRIP: NEGATIVE
NITRATE UR QL: NEGATIVE
PH UR STRIP: 6.5 [PH] (ref 5–7)
SP GR UR STRIP: 1.01 (ref 1–1.03)
UROBILINOGEN UR STRIP-ACNC: 0.2 E.U./DL

## 2023-05-17 PROCEDURE — 81003 URINALYSIS AUTO W/O SCOPE: CPT | Performed by: UROLOGY

## 2023-05-17 PROCEDURE — 52000 CYSTOURETHROSCOPY: CPT | Performed by: UROLOGY

## 2023-05-17 PROCEDURE — 99213 OFFICE O/P EST LOW 20 MIN: CPT | Mod: 25 | Performed by: UROLOGY

## 2023-05-17 RX ORDER — LIDOCAINE HYDROCHLORIDE 20 MG/ML
JELLY TOPICAL ONCE
Status: ACTIVE | OUTPATIENT
Start: 2023-05-17

## 2023-05-17 NOTE — PATIENT INSTRUCTIONS
"Websites with free information:    American Urogynec[employer] Society patient website: www.voicesforpfd.org    Total Control Program: www.totalcontrolprogram.com    Supplements to prevent UTI to consider  -Probiotics  -Cranberry (for these products let them know a doctor is recommending them)   Ellura: www.myellura.Slack   Theracran HP by [employer] 11326  -d-mannose 2gm daily  -Vitamin C 500-1000mg twice a day    It was a pleasure meeting with you today.  Thank you for allowing me and my team the privilege of caring for you today.  YOU are the reason we are here, and I truly hope we provided you with the excellent service you deserve.  Please let us know if there is anything else we can do for you so that we can be sure you are leaving completely satisfied with your care experience.                          AFTER YOUR CYSTOSCOPY  ?  ?  You have just completed a cystoscopy, or \"cysto\", which allowed your physician to learn more about your bladder (or to remove a stent placed after surgery). We suggest that you continue to avoid caffeine, fruit juice, and alcohol for the next 24 hours, however, you are encouraged to return to your normal activities.  ?  ?  A few things that are considered normal after your cystoscopy:  ?  * small amount of bleeding (or spotting) that clears within the next 24 hours  ?  * slight burning sensation with urination  ?  * sensation of needing to void (urinate) more frequently  ?  * the feeling of \"air\" in your urine  ?  * mild discomfort that is relieved with Tylenol    * bladder spasms  ?  ?  ?  Please contact our office promptly if you:  ?  * develop a fever above 101 degrees  ?  * are unable to urinate  ?  * develop bright red blood that does not stop  ?  * experience severe pain or swelling  ?  ?  ?  And of course, please contact our office with any concerns or questions [phone].  "

## 2023-05-17 NOTE — LETTER
"5/17/2023       RE: Jessie Karimi  [address]  [facility] [address]     Dear Colleague,    Thank you for referring your patient, Jessie Karimi, to the [facility] UROLOGY CLIN[facility] [place] [facility]. Please see a copy of my visit note below.    May 17, 2023    Return visit    Patient returns today for follow up.  Currently on postcoital macrobid. She denies any changes in her health since last visit.    /74   Pulse 81   Ht 1.562 m (5' 1.5\")   Wt 45.4 kg (100 lb)   LMP 04/16/2023   SpO2 100%   BMI 18.59 kg/m    She is comfortable, in no distress, non-labored breathing.  Abdomen is soft, non-tender, non-distended.  Normal external female genitalia.  Negative CST.  Pelvic exam is remarkable for high tone pelvic floor    Cystoscopy Note: After informed consent was obtained patient was prepped and draped in the standard fashion.  The flexible cystoscope was inserted into a normal appearing urethral meatus.  The urothelium was carefully examined and there were some small whitish areas in the trigone consistent with squamous metaplasia.  There were no obvious tumors, masses, stones, foreign bodies, or other urothelial abnormalities noted.  Bilateral ureteral orifices were noted in the normal orthotopic position and both effluxed clear urine.  The cystoscope was retroflexed and the bladder neck was unremarkable.  The urethra was carefully examined upon removing the cystoscope and was unremarkable.  Patient tolerated the procedure without complications noted.      A/P: 47 year old F with recurrent UTI, urinary urgency frequency, OAB, high tone pelvic floor dysfunction    Continue postcoital macrobid for now.  Discussed supplements to consider    If the OAB and the pelvic floor dysfunction gets worse consider PT    RTC 6 months to see Carolin, sooner if needed    20 minutes were spent today on the date of the encounter in reviewing the EMR, direct " patient care, coordination of care and documentation in addition to the cystoscopy procedure    Angelia Barajas MD MPH  (she/her/hers)   of Urology  [facility]      CC  Patient Care Team:  Surekha Garcia MD as PCP - General (Internal Medicine)  Surekha Garcia MD as Assigned PCP  Maria A Rock PA-C as Physician Assistant  Jerrica Rios APRN CNP as Assigned OBGYN Provider

## 2023-05-17 NOTE — NURSING NOTE
Chief Complaint   Patient presents with     Recurrent UTI     Patient here today for cystoscopy        UA RESULTS:  Recent Labs   Lab Test 05/17/23  1033 04/07/23  1131 03/03/23  0814   COLOR Yellow   < > Yellow   APPEARANCE Clear   < > Clear   URINEGLC Negative   < > Negative   URINEBILI Negative   < > Negative   URINEKETONE Negative   < > Negative   SG 1.015   < > 1.020   UBLD Trace*   < > Trace*   URINEPH 6.5   < > 7.0   PROTEIN Negative   < > Negative   UROBILINOGEN 0.2   < > 0.2   NITRITE Negative   < > Negative   LEUKEST Negative   < > Trace*   RBCU  --   --  2-5*   WBCU  --   --  10-25*    < > = values in this interval not displayed.       Prior to the start of the procedure and with procedural staff participation, I verbally confirmed the patient s identity using two indicators, relevant allergies, that the procedure was appropriate and matched the consent or emergent situation, and that the correct equipment/implants were available. Immediately prior to starting the procedure I conducted the Time Out with the procedural staff and re-confirmed the patient s name, procedure, and site/side. I have wiped the patient off with the povidone-Iodine solution, draped them,  used Lidocaine hydrochloride jelly, and instilled sterile water into the bladder. (The Joint Commission universal protocol was followed.)  Yes    Sedation (Moderate or Deep): None    5mL 2% lidocaine hydrochloride Urojet instilled into urethra.    NDC# [address]-2  Lot #: [id number]  Expiration Date:  06/24      ISIS Macedo

## 2023-05-17 NOTE — PROGRESS NOTES
"May 17, 2023    Return visit    Patient returns today for follow up.  Currently on postcoital macrobid. She denies any changes in her health since last visit.    /74   Pulse 81   Ht 1.562 m (5' 1.5\")   Wt 45.4 kg (100 lb)   LMP 04/16/2023   SpO2 100%   BMI 18.59 kg/m    She is comfortable, in no distress, non-labored breathing.  Abdomen is soft, non-tender, non-distended.  Normal external female genitalia.  Negative CST.  Pelvic exam is remarkable for high tone pelvic floor    Cystoscopy Note: After informed consent was obtained patient was prepped and draped in the standard fashion.  The flexible cystoscope was inserted into a normal appearing urethral meatus.  The urothelium was carefully examined and there were some small whitish areas in the trigone consistent with squamous metaplasia.  There were no obvious tumors, masses, stones, foreign bodies, or other urothelial abnormalities noted.  Bilateral ureteral orifices were noted in the normal orthotopic position and both effluxed clear urine.  The cystoscope was retroflexed and the bladder neck was unremarkable.  The urethra was carefully examined upon removing the cystoscope and was unremarkable.  Patient tolerated the procedure without complications noted.      A/P: 47 year old F with recurrent UTI, urinary urgency frequency, OAB, high tone pelvic floor dysfunction    Continue postcoital macrobid for now.  Discussed supplements to consider    If the OAB and the pelvic floor dysfunction gets worse consider PT    RTC 6 months to see Carolin, sooner if needed    20 minutes were spent today on the date of the encounter in reviewing the EMR, direct patient care, coordination of care and documentation in addition to the cystoscopy procedure    Angelia Barajas MD MPH  (she/her/hers)   of Urology  [facility]      CC  Patient Care Team:  Surekha Garcia MD as PCP - General (Internal Medicine)  Surekha Garcia MD as Assigned " PCP  Maria A Rock PA-C as Physician Assistant  Jerrica Rios, APRN CNP as Assigned OBGYN Provider

## 2023-05-19 DIAGNOSIS — L71.0 PERIORAL DERMATITIS: ICD-10-CM

## 2023-05-19 RX ORDER — DOXYCYCLINE HYCLATE 20 MG
TABLET ORAL
Qty: 180 TABLET | Refills: 1 | Status: SHIPPED | OUTPATIENT
Start: 2023-05-19 | End: 2023-11-13

## 2023-06-01 ENCOUNTER — MYC MEDICAL ADVICE (OUTPATIENT)
Dept: FAMILY MEDICINE | Facility: CLINIC | Age: 48
End: 2023-06-01
Payer: COMMERCIAL

## 2023-06-01 DIAGNOSIS — B37.9 YEAST INFECTION: Primary | ICD-10-CM

## 2023-06-01 DIAGNOSIS — N39.0 RECURRENT UTI: ICD-10-CM

## 2023-06-01 RX ORDER — NITROFURANTOIN 25; 75 MG/1; MG/1
100 CAPSULE ORAL DAILY PRN
Qty: 30 CAPSULE | Refills: 3 | Status: SHIPPED | OUTPATIENT
Start: 2023-06-01 | End: 2024-05-14

## 2023-06-01 RX ORDER — FLUCONAZOLE 150 MG/1
150 TABLET ORAL ONCE
Qty: 1 TABLET | Refills: 0 | Status: SHIPPED | OUTPATIENT
Start: 2023-06-01 | End: 2023-06-01

## 2023-06-05 RX ORDER — FLUCONAZOLE 150 MG/1
150 TABLET ORAL ONCE
Qty: 30 TABLET | Refills: 0 | Status: SHIPPED | OUTPATIENT
Start: 2023-06-05 | End: 2023-06-05

## 2023-07-10 ENCOUNTER — PATIENT OUTREACH (OUTPATIENT)
Dept: CARE COORDINATION | Facility: CLINIC | Age: 48
End: 2023-07-10
Payer: COMMERCIAL

## 2023-07-24 ENCOUNTER — PATIENT OUTREACH (OUTPATIENT)
Dept: CARE COORDINATION | Facility: CLINIC | Age: 48
End: 2023-07-24
Payer: COMMERCIAL

## 2023-11-11 ENCOUNTER — HEALTH MAINTENANCE LETTER (OUTPATIENT)
Age: 48
End: 2023-11-11

## 2023-11-13 DIAGNOSIS — L71.0 PERIORAL DERMATITIS: ICD-10-CM

## 2023-11-13 RX ORDER — DOXYCYCLINE HYCLATE 20 MG
TABLET ORAL
Qty: 180 TABLET | Refills: 1 | Status: SHIPPED | OUTPATIENT
Start: 2023-11-13 | End: 2024-03-21

## 2023-12-05 ENCOUNTER — TELEPHONE (OUTPATIENT)
Dept: FAMILY MEDICINE | Facility: CLINIC | Age: 48
End: 2023-12-05
Payer: COMMERCIAL

## 2023-12-05 NOTE — TELEPHONE ENCOUNTER
Reason for Call:  Appointment Request    Patient requesting this type of appt:  Preventive     Requested provider: Surekha Garcia    Reason patient unable to be scheduled: Not within requested timeframe    When does patient want to be seen/preferred time: 1-2 weeks    Comments: Pt need an appt for physical and preop, working on surgery date in Jan    Could we send this information to you in [employer] or would you prefer to receive a phone call?:   Patient would like to be contacted via [employer]    Call taken on 12/5/2023 at 12:15 PM by Briana Vincent

## 2023-12-13 ENCOUNTER — ANCILLARY PROCEDURE (OUTPATIENT)
Dept: MAMMOGRAPHY | Facility: CLINIC | Age: 48
End: 2023-12-13
Attending: INTERNAL MEDICINE
Payer: COMMERCIAL

## 2023-12-13 DIAGNOSIS — Z12.31 VISIT FOR SCREENING MAMMOGRAM: ICD-10-CM

## 2023-12-13 PROCEDURE — 77063 BREAST TOMOSYNTHESIS BI: CPT | Mod: TC | Performed by: RADIOLOGY

## 2023-12-13 PROCEDURE — 77067 SCR MAMMO BI INCL CAD: CPT | Mod: TC | Performed by: RADIOLOGY

## 2023-12-19 ENCOUNTER — OFFICE VISIT (OUTPATIENT)
Dept: FAMILY MEDICINE | Facility: CLINIC | Age: 48
End: 2023-12-19
Payer: COMMERCIAL

## 2023-12-19 VITALS
HEIGHT: 62 IN | SYSTOLIC BLOOD PRESSURE: 112 MMHG | BODY MASS INDEX: 19.14 KG/M2 | RESPIRATION RATE: 16 BRPM | DIASTOLIC BLOOD PRESSURE: 76 MMHG | TEMPERATURE: 98 F | OXYGEN SATURATION: 99 % | HEART RATE: 73 BPM | WEIGHT: 104 LBS

## 2023-12-19 DIAGNOSIS — N39.0 RECURRENT UTI: ICD-10-CM

## 2023-12-19 DIAGNOSIS — Z41.9 ELECTIVE SURGERY: ICD-10-CM

## 2023-12-19 DIAGNOSIS — Z01.818 PREOP GENERAL PHYSICAL EXAM: Primary | ICD-10-CM

## 2023-12-19 DIAGNOSIS — Z91.018 TREE NUT ALLERGY: ICD-10-CM

## 2023-12-19 LAB
ANION GAP SERPL CALCULATED.3IONS-SCNC: 13 MMOL/L (ref 7–15)
BUN SERPL-MCNC: 15.8 MG/DL (ref 6–20)
CALCIUM SERPL-MCNC: 10.1 MG/DL (ref 8.6–10)
CHLORIDE SERPL-SCNC: 101 MMOL/L (ref 98–107)
CREAT SERPL-MCNC: 0.57 MG/DL (ref 0.51–0.95)
DEPRECATED HCO3 PLAS-SCNC: 27 MMOL/L (ref 22–29)
EGFRCR SERPLBLD CKD-EPI 2021: >90 ML/MIN/1.73M2
ERYTHROCYTE [DISTWIDTH] IN BLOOD BY AUTOMATED COUNT: 13.1 % (ref 10–15)
GLUCOSE SERPL-MCNC: 100 MG/DL (ref 70–99)
HCG SERPL QL: NEGATIVE
HCT VFR BLD AUTO: 40.3 % (ref 35–47)
HGB BLD-MCNC: 13 G/DL (ref 11.7–15.7)
MCH RBC QN AUTO: 29.6 PG (ref 26.5–33)
MCHC RBC AUTO-ENTMCNC: 32.3 G/DL (ref 31.5–36.5)
MCV RBC AUTO: 92 FL (ref 78–100)
PLATELET # BLD AUTO: 221 10E3/UL (ref 150–450)
POTASSIUM SERPL-SCNC: 4.2 MMOL/L (ref 3.4–5.3)
RBC # BLD AUTO: 4.39 10E6/UL (ref 3.8–5.2)
SODIUM SERPL-SCNC: 141 MMOL/L (ref 135–145)
WBC # BLD AUTO: 7.1 10E3/UL (ref 4–11)

## 2023-12-19 PROCEDURE — 84703 CHORIONIC GONADOTROPIN ASSAY: CPT | Performed by: PHYSICIAN ASSISTANT

## 2023-12-19 PROCEDURE — 99214 OFFICE O/P EST MOD 30 MIN: CPT | Performed by: PHYSICIAN ASSISTANT

## 2023-12-19 PROCEDURE — 36415 COLL VENOUS BLD VENIPUNCTURE: CPT | Performed by: PHYSICIAN ASSISTANT

## 2023-12-19 PROCEDURE — 85027 COMPLETE CBC AUTOMATED: CPT | Performed by: PHYSICIAN ASSISTANT

## 2023-12-19 PROCEDURE — 80048 BASIC METABOLIC PNL TOTAL CA: CPT | Performed by: PHYSICIAN ASSISTANT

## 2023-12-19 RX ORDER — FLUCONAZOLE 150 MG/1
150 TABLET ORAL
COMMUNITY
Start: 2023-06-05 | End: 2024-03-21

## 2023-12-19 ASSESSMENT — PAIN SCALES - GENERAL: PAINLEVEL: NO PAIN (0)

## 2023-12-19 NOTE — PROGRESS NOTES
[address], [address]  [facility] [address]  Phone: [phone]  Primary Provider: Surekha Garcia  Pre-op Performing Provider: ZAYDA PEÑA      PREOPERATIVE EVALUATION:  Today's date: 12/19/2023    Jessie is a 48 year old, presenting for the following:  Pre-Op Exam         No data to display                Surgical Information:  Surgery/Procedure: Breast Augmentation   Surgery Location: [facility]  Surgeon: Dr. Su Dial  Surgery Date: 1/18/24  Time of Surgery: 7:15am  Where patient plans to recover: At home with family  Fax number for surgical facility: [phone]    Assessment & Plan     The proposed surgical procedure is considered INTERMEDIATE risk.    (Z01.818) Preop general physical exam  (primary encounter diagnosis)  Comment: on minimal medication, healthy, able to easily do 4 METS  Plan: CBC with platelets, Basic metabolic panel  (Ca,        Cl, CO2, Creat, Gluc, K, Na, BUN), HCG         qualitative, Blood ([id number])        Cleared for surgery    (Z41.9) Elective surgery  Comment:   Plan:     (Z91.018) Tree nut allergy  Comment:   Plan:     (N39.0) Recurrent UTI  Comment:   Plan:        - No identified additional risk factors other than previously addressed    Antiplatelet or Anticoagulation Medication Instructions:   - Patient is on no antiplatelet or anticoagulation medications.    Additional Medication Instructions:  Stop herbals/supplements one week prior to surgery    Take medications after surgery on the day of procedure     RECOMMENDATION:  APPROVAL GIVEN to proceed with proposed procedure, without further diagnostic evaluation.    Zayda Pringle PA-C      Subjective       HPI related to upcoming procedure:   Jessie is here for pre-op for breast augmentation.    She generally feels well.  She exercises regularly, attends barre classes almost daily and walks.  She denies chest pain or sob with  exercise.    She also denies history of MI/CVA, DVT/PE, DM or adverse reaction to anesthesia.          12/12/2023    12:06 PM   Preop Questions   1. Have you ever had a heart attack or stroke? No   2. Have you ever had surgery on your heart or blood vessels, such as a stent placement, a coronary artery bypass, or surgery on an artery in your head, neck, heart, or legs? No   3. Do you have chest pain with activity? No   4. Do you have a history of  heart failure? No   5. Do you currently have a cold, bronchitis or symptoms of other infection? No   6. Do you have a cough, shortness of breath, or wheezing? No   7. Do you or anyone in your family have previous history of blood clots? No   8. Do you or does anyone in your family have a serious bleeding problem such as prolonged bleeding following surgeries or cuts? No   9. Have you ever had problems with anemia or been told to take iron pills? No   10. Have you had any abnormal blood loss such as black, tarry or bloody stools, or abnormal vaginal bleeding? No   11. Have you ever had a blood transfusion? No   12. Are you willing to have a blood transfusion if it is medically needed before, during, or after your surgery? Yes   13. Have you or any of your relatives ever had problems with anesthesia? No   14. Do you have sleep apnea, excessive snoring or daytime drowsiness? No   15. Do you have any artifical heart valves or other implanted medical devices like a pacemaker, defibrillator, or continuous glucose monitor? No   16. Do you have artificial joints? No   17. Are you allergic to latex? YES:    18. Is there any chance that you may be pregnant? No     Review of Systems  CONSTITUTIONAL: NEGATIVE for fever, chills, change in weight  INTEGUMENTARY/SKIN: NEGATIVE for worrisome rashes, moles or lesions  EYES: NEGATIVE for vision changes or irritation  ENT/MOUTH: NEGATIVE for ear, mouth and throat problems  RESP: NEGATIVE for significant cough or SOB  CV: NEGATIVE for chest  pain, palpitations or peripheral edema  GI: NEGATIVE for nausea, abdominal pain, heartburn, or change in bowel habits  : NEGATIVE for frequency, dysuria, or hematuria  MUSCULOSKELETAL: NEGATIVE for significant arthralgias or myalgia  NEURO: NEGATIVE for weakness, dizziness or paresthesias  HEME: NEGATIVE for bleeding problems    Patient Active Problem List    Diagnosis Date Noted    Urinary frequency 05/17/2023     Priority: Medium    Urinary urgency 05/17/2023     Priority: Medium    Annual physical exam 08/08/2022     Priority: Medium    Cervical cancer screening 08/08/2022     Priority: Medium    Recurrent UTI 08/08/2022     Priority: Medium    Screening for STD (sexually transmitted disease) 08/08/2022     Priority: Medium    Perioral dermatitis 08/08/2022     Priority: Medium    Cervical high risk HPV (human papillomavirus) test positive 05/11/2018     Priority: Medium    Encounter for surveillance of contraceptive pills 03/04/2016     Priority: Medium    ASCUS with positive high risk HPV cervical 03/04/2016     Priority: Medium     3/4/16: Pap - ASCUS, +HR HPV. Plan colp  3/22/16 [facility]: ECC normal. Plan: cotest in 12 mo. Tracking.   3/9/17:Pap--LSIL, neg HPV. Plan colp  05/17/17: [facility] Bx normal. Plan cotest in 1 year per provider.  5/11/18 LSIL Pap, + HR HPV (Neg 16/18). Plan colp due by 8/11/18.   6/15/18 [facility] bx and ECC: negative. Plan: cotest in 1 yr.  7/22/19 ASCUS Pap, + HR HPV (Neg 16/18). Plan colp  09/12/19: [facility] Bx - cannot exclude low grade dysplasia. Plan cotest in 1 year.   9/21/20 NIL Pap, + HR HPV (neg 16/18). Plan cotest in 1 year.   9/23/21 NIL pap, neg HPV. Plan: cotest in 1 year per provider  8/8/22 NIL Pap, Neg HPV. Plan cotest in 3 years.             Tree nut allergy 03/13/2015     Priority: Medium    Environmental allergies 02/19/2013     Priority: Medium    Anxiety 09/06/2011     Priority: Medium    CARDIOVASCULAR SCREENING; LDL GOAL LESS THAN 160 05/09/2010     Priority: Medium     Allergic 08/20/2008     Priority: Medium    Encounter for other general counseling or advice on contraception 08/24/2006     Priority: Medium     Diagnosis updated by automated process. Provider to review and confirm.      Other acne 08/01/2006     Priority: Medium    Health Care Home 04/01/2013     Priority: Low     EMERGENCY CARE PLAN  April 1, 2013: No current Care Coordination follow up planned. Please refer if Care Coordination services are needed.    Presenting Problem Signs and Symptoms Treatment Plan   Questions or concerns   during clinic hours   I will call my clinic directly:  [address] [address]  [phone].   Questions or concerns outside clinic hours   I will call the 24 hour nurse line at   [phone] or [facility].   Need to schedule an appointment   I will call the 24 hour scheduling team at [phone] or my clinic directly at [phone].    Same day treatment     I will call my clinic first, nurse line if after hours, urgent care and express care if needed.     Clinic care coordination services (regular clinic hours)     I will call a clinic care coordinator directly:     Miguel Patiño RN  Mon, es, Fri - [phone]  Wed, Thurs - [phone]    Daksha Perry :    [phone]    Or call my clinic at [phone] and ask to speak with care coordination.     Crisis Services: Behavioral or Mental Health  [facility] 24 Hour Phone Line  [phone]    [facility] 24 Hour Crisis Services  [phone]    [facility] (Behavioral Health Providers) Network [phone]    [facility]   [phone]         Emergency treatment -- Immediately    CAll 911     DX V65.8 REPLACED WITH [id number] HEALTH CARE HOME (04/08/2013)        Past Medical History:   Diagnosis Date    Abnormal Pap smear of cervix 2016, 2017, 2018, 2019    see problem list    Allergic 08/20/2008    Anal fissure     Cervical high risk HPV (human papillomavirus)  test positive 2016, 2018, 2019    See Problem List    H/O colposcopy with cervical biopsy 05/17/2017 05/17/17: [facility] Bx normal.     Personal history of other genital system and obstetric disorders(V13.29)     Spider veins      Past Surgical History:   Procedure Laterality Date    BIOPSY  04/2013    moles removed by [facility] and biopsied; benign    COLONOSCOPY N/A 06/13/2022    Procedure: COLONOSCOPY, WITH POLYPECTOMY AND BIOPSY;  Surgeon: Joie Barraza MD;  Location:  GI    CYSTOSCOPY      [facility] NONSPECIFIC PROCEDURE      wisdom teeth removed     Current Outpatient Medications   Medication Sig Dispense Refill    Ascorbic Acid (VITAMIN C PO) Take by mouth daily      Cholecalciferol (VITAMIN D PO) Take 1,000 Units by mouth daily      CRANBERRY PO Take by mouth daily      doxycycline hyclate (PERIOSTAT) 20 MG tablet TAKE 1 TABLET BY MOUTH TWICE A  tablet 1    ferrous sulfate (FEROSUL) 325 (65 Fe) MG tablet Take 325 mg by mouth daily (with breakfast)      lactobacillus rhamnosus, GG, (CULTURELL) capsule Take 1 capsule by mouth 2 times daily      LORazepam (ATIVAN) 0.5 MG tablet 1-2 tablets once daily as needed for anxiety 12 tablet 0    nitroFURantoin macrocrystal-monohydrate (MACROBID) 100 MG capsule Take 1 capsule (100 mg) by mouth daily as needed (post coital antibiotic for UTI prevention) 30 capsule 3    ZYRTEC 10 MG OR TABS 1 TABLET DAILY 30 0       Allergies   Allergen Reactions    Tree Nuts [Nuts] Anaphylaxis    Latex     Quinolones         Social History     Tobacco Use    Smoking status: Never    Smokeless tobacco: Never   Substance Use Topics    Alcohol use: Yes     Comment: occasionaly     Family History   Problem Relation Age of Onset    Diabetes Mother         Type II    Breast Cancer Mother         Diagnosed Aug 2012; double mast + chem; remiss.    Heart Failure Mother     Diabetes Father         Type II    Prostate Cancer Father     C.A.D. Maternal Grandmother     Cerebrovascular Disease  "Maternal Grandmother     Diabetes Maternal Uncle     Hypertension No family hx of     Cancer - colorectal No family hx of      History   Drug Use No         Objective     /76 (BP Location: Right arm, Patient Position: Sitting, Cuff Size: Adult Regular)   Pulse 73   Temp 98  F (36.7  C) (Oral)   Resp 16   Ht 1.562 m (5' 1.5\")   Wt 47.2 kg (104 lb)   LMP  (LMP Unknown)   SpO2 99%   Breastfeeding No   BMI 19.33 kg/m      Physical Exam      GENERAL APPEARANCE: healthy, alert and no distress     EYES: no scleral icterus     HENT: OP clear mouth without ulcers or lesions     RESP: lungs clear to auscultation - no rales, rhonchi or wheezes     CV: regular rates and rhythm, normal S1 S2, no S3 or S4 and no murmur, click or rub     ABDOMEN:  soft, nontender, no HSM or masses and bowel sounds normal     MS: extremities normal- no gross deformities noted, no edema     NEURO: Normal mentation, gait and speechnormal     PSYCH: mentation appears normal. and affect normal/bright      Recent Labs   Lab Test 08/08/22  1440   HGB 14.0         POTASSIUM 3.9   CR 0.56        Diagnostics:  Labs pending at this time.  Results will be reviewed when available.   No EKG required, no history of coronary heart disease, significant arrhythmia, peripheral arterial disease or other structural heart disease.    Revised Cardiac Risk Index (RCRI):  The patient has the following serious cardiovascular risks for perioperative complications:   - No serious cardiac risks = 0 points     RCRI Interpretation: 0 points: Class I (very low risk - 0.4% complication rate)         Signed Electronically by: Lilia Pringle PA-C  Copy of this evaluation report is provided to requesting physician.      "

## 2023-12-19 NOTE — PATIENT INSTRUCTIONS
Stop herbals/supplements one week prior to surgery    Take medications after surgery on the day of procedure     Labs today

## 2023-12-20 NOTE — RESULT ENCOUNTER NOTE
Donte Roman,    Here are the rest of your labs: your basic metabolic panel shows normal kidney function and slightly elevated calcium.  Your pregnancy test was negative.  I'd recommend rechecking the calcium when you see Dr. Garcia next.     Please let us know if you have questions or concerns.     Regards,  Lilia Pringle PA-C

## 2023-12-22 ENCOUNTER — OFFICE VISIT (OUTPATIENT)
Dept: UROLOGY | Facility: CLINIC | Age: 48
End: 2023-12-22
Payer: COMMERCIAL

## 2023-12-22 VITALS
OXYGEN SATURATION: 100 % | SYSTOLIC BLOOD PRESSURE: 121 MMHG | HEART RATE: 75 BPM | HEIGHT: 62 IN | BODY MASS INDEX: 18.95 KG/M2 | WEIGHT: 103 LBS | DIASTOLIC BLOOD PRESSURE: 72 MMHG

## 2023-12-22 DIAGNOSIS — M62.89 HIGH-TONE PELVIC FLOOR DYSFUNCTION: ICD-10-CM

## 2023-12-22 DIAGNOSIS — N39.0 RECURRENT UTI: Primary | ICD-10-CM

## 2023-12-22 DIAGNOSIS — R35.0 URINARY FREQUENCY: ICD-10-CM

## 2023-12-22 DIAGNOSIS — R39.15 URINARY URGENCY: ICD-10-CM

## 2023-12-22 PROCEDURE — 99214 OFFICE O/P EST MOD 30 MIN: CPT | Performed by: PHYSICIAN ASSISTANT

## 2023-12-22 NOTE — PROGRESS NOTES
Urology Clinic      Name: Jessie Karimi    MRN: [id number]   YOB: 1975  Accompanied at today's visit by:self                 Chief Complaint:   Hx of UTIs          History of Present Illness:   December 22, 2023    HISTORY:   We have been following 48 year old Jessie Karimi for recurrent UTI, urinary urgency frequency, OAB, high tone pelvic floor dysfunction. Her CT urogram on 5/11/23 was unremarkable from urologic standpoint. Last seen on 5/17/23 for cystoscopy with Dr. Barajas. Cysto showed some small whitish areas in the trigone consistent with squamous metaplasia but otherwise unremarkable. Continues on post coital macrobid for now. Last UTI was on 3/3/23 showing Klebsiella pneumoniae. Is very pleased with her current regimen and would like to continue this. Patient voices no other concerns at this time.            Allergies:     Allergies   Allergen Reactions    Tree Nuts [Nuts] Anaphylaxis    Latex     Quinolones             Medications:     Current Outpatient Medications   Medication Sig    Ascorbic Acid (VITAMIN C PO) Take by mouth daily    Cholecalciferol (VITAMIN D PO) Take 1,000 Units by mouth daily    CRANBERRY PO Take by mouth daily    doxycycline hyclate (PERIOSTAT) 20 MG tablet TAKE 1 TABLET BY MOUTH TWICE A DAY    ferrous sulfate (FEROSUL) 325 (65 Fe) MG tablet Take 325 mg by mouth daily (with breakfast)    fluconazole (DIFLUCAN) 150 MG tablet Take 150 mg by mouth    lactobacillus rhamnosus, GG, (CULTURELL) capsule Take 1 capsule by mouth 2 times daily    LORazepam (ATIVAN) 0.5 MG tablet 1-2 tablets once daily as needed for anxiety    nitroFURantoin macrocrystal-monohydrate (MACROBID) 100 MG capsule Take 1 capsule (100 mg) by mouth daily as needed (post coital antibiotic for UTI prevention)    ZYRTEC 10 MG OR TABS 1 TABLET DAILY     No current facility-administered medications for this visit.     Facility-Administered Medications Ordered in Other Visits   Medication    lidocaine  "(XYLOCAINE) 2 % external gel               Past  Surgical History:     Past Surgical History:   Procedure Laterality Date    BIOPSY  04/2013    moles removed by [facility] and biopsied; benign    COLONOSCOPY N/A 06/13/2022    Procedure: COLONOSCOPY, WITH POLYPECTOMY AND BIOPSY;  Surgeon: Joie Barraza MD;  Location:  GI    CYSTOSCOPY      [facility] NONSPECIFIC PROCEDURE      wisdom teeth removed             Physical Exam:     Vitals:    12/22/23 0849   BP: 121/72   Pulse: 75   SpO2: 100%   Weight: 46.7 kg (103 lb)   Height: 1.562 m (5' 1.5\")     PSYCH: NAD  EYES: EOMI  NEURO: AAO x3    LABS:   UC  7/16/22 E. coli   7/23/22 E. coli; resistant to cephalosporin  7/29/22 No growth  9/1/22 Klebsiella pneumoniae  3/3/23 Klebsiella pneumoniae  (resistant to ampicillin and cipro)   5/8/23 No growth    Creatinine   Date Value Ref Range Status   12/19/2023 0.57 0.51 - 0.95 mg/dL Final   09/21/2020 0.56 0.52 - 1.04 mg/dL Final     Cystoscopy Note 5/17/23: After informed consent was obtained patient was prepped and draped in the standard fashion.  The flexible cystoscope was inserted into a normal appearing urethral meatus.  The urothelium was carefully examined and there were some small whitish areas in the trigone consistent with squamous metaplasia.  There were no obvious tumors, masses, stones, foreign bodies, or other urothelial abnormalities noted.  Bilateral ureteral orifices were noted in the normal orthotopic position and both effluxed clear urine.  The cystoscope was retroflexed and the bladder neck was unremarkable.  The urethra was carefully examined upon removing the cystoscope and was unremarkable.  Patient tolerated the procedure without complications noted.       CT urogram 5/11/23  FINDINGS: There are no stones seen within either kidney, either  ureter, or the bladder. There is no hydroureter or hydronephrosis.  There is no perinephric fat stranding. Kidneys are normal in size and  configuration. No suspicious " filling defects seen in the opacified  intrarenal collecting systems, ureters, or bladder to suggest a  urothelial malignancy. The liver, spleen, adrenal glands, and pancreas  demonstrate no worrisome focal lesion. No free fluid. No free air in  the abdomen. There are no abdominal or pelvic lymph nodes that are  abnormal by size criteria. There are no dilated loops of small  intestine or large bowel to suggest ileus or obstruction.The  visualized lung bases are unremarkable. Bone windows reveal no  destructive lesions.                                                                    IMPRESSION:   1. No evidence for renal, ureteral, or bladder calculi.  2. No masses or suspicious filling defects within the opacified  urinary collecting system.         Assessment and Plan:   48 year old is a pleasant female who has ecurrent UTI, urinary urgency frequency, OAB, high tone pelvic floor dysfunction.    Plan:    - Consider oxybutynin for urinary frequency if becomes bothersome  - Consider PFPT in the future if urinary symptoms or pelvic floor dysfunction becomes bothersome.  - continue macrobid post coitally. Discussed risks/benefits of continuing macrobid including resistance to macrobid in the future. Patient would like to continue. Will notify when needs refills.   - Followup in 6 months.   - consider estrogen cream once goes through menopause.   - consider methenamine in the future if UTIs persist in the future.   - After discussing the assessment and plan with patient, patient verbalizes understanding and agrees to the above plan. All questions answered.     8 minutes spent on the date of the encounter doing chart review, review of outside records, review of test results, interpretation of tests, patient visit and documentation.      Maria A Rock PA-C  Urology  December 22, 2023      Patient Care Team:  Surekha Garcia MD as PCP - General (Internal Medicine)  Surekha Garcia MD as Assigned PCP  Maria A Rock  [facility] as Physician Assistant  Jerrica Rios APRN CNP as Assigned OBGYN Provider  Angelia Barajas MD as Assigned Surgical Provider

## 2023-12-22 NOTE — NURSING NOTE
Chief Complaint   Patient presents with    Recurrent UTI     Patient here today for 6 month follow up       Patient state that  her recurrent UTI, urinary urgency frequency, OAB, all are doing better.  Patient state she just her ABX as needed.            ISIS Macedo

## 2023-12-22 NOTE — LETTER
12/22/2023       RE: Jessie Karimi  [address]  [facility] [address]     Dear Colleague,    Thank you for referring your patient, Jessie Karimi, to the [facility] [place] at [facility]. Please see a copy of my visit note below.    Urology Clinic      Name: eJssie Karimi    MRN: [id number]   YOB: 1975  Accompanied at today's visit by:self                 Chief Complaint:   Hx of UTIs          History of Present Illness:   December 22, 2023    HISTORY:   We have been following 48 year old Jessie Karimi for recurrent UTI, urinary urgency frequency, OAB, high tone pelvic floor dysfunction. Her CT urogram on 5/11/23 was unremarkable from urologic standpoint. Last seen on 5/17/23 for cystoscopy with Dr. Barajas. Cysto showed some small whitish areas in the trigone consistent with squamous metaplasia but otherwise unremarkable. Continues on post coital macrobid for now. Last UTI was on 3/3/23 showing Klebsiella pneumoniae. Is very pleased with her current regimen and would like to continue this. Patient voices no other concerns at this time.            Allergies:     Allergies   Allergen Reactions    Tree Nuts [Nuts] Anaphylaxis    Latex     Quinolones             Medications:     Current Outpatient Medications   Medication Sig    Ascorbic Acid (VITAMIN C PO) Take by mouth daily    Cholecalciferol (VITAMIN D PO) Take 1,000 Units by mouth daily    CRANBERRY PO Take by mouth daily    doxycycline hyclate (PERIOSTAT) 20 MG tablet TAKE 1 TABLET BY MOUTH TWICE A DAY    ferrous sulfate (FEROSUL) 325 (65 Fe) MG tablet Take 325 mg by mouth daily (with breakfast)    fluconazole (DIFLUCAN) 150 MG tablet Take 150 mg by mouth    lactobacillus rhamnosus, GG, (CULTURELL) capsule Take 1 capsule by mouth 2 times daily    LORazepam (ATIVAN) 0.5 MG tablet 1-2 tablets once daily as needed for anxiety    nitroFURantoin  "macrocrystal-monohydrate (MACROBID) 100 MG capsule Take 1 capsule (100 mg) by mouth daily as needed (post coital antibiotic for UTI prevention)    ZYRTEC 10 MG OR TABS 1 TABLET DAILY     No current facility-administered medications for this visit.     Facility-Administered Medications Ordered in Other Visits   Medication    lidocaine (XYLOCAINE) 2 % external gel               Past  Surgical History:     Past Surgical History:   Procedure Laterality Date    BIOPSY  04/2013    moles removed by Naz and biopsied; benign    COLONOSCOPY N/A 06/13/2022    Procedure: COLONOSCOPY, WITH POLYPECTOMY AND BIOPSY;  Surgeon: Joie Barraza MD;  Location:  GI    CYSTOSCOPY      ZZC NONSPECIFIC PROCEDURE      wisdom teeth removed             Physical Exam:     Vitals:    12/22/23 0849   BP: 121/72   Pulse: 75   SpO2: 100%   Weight: 46.7 kg (103 lb)   Height: 1.562 m (5' 1.5\")     PSYCH: NAD  EYES: EOMI  NEURO: AAO x3    LABS:   UC  7/16/22 E. coli   7/23/22 E. coli; resistant to cephalosporin  7/29/22 No growth  9/1/22 Klebsiella pneumoniae  3/3/23 Klebsiella pneumoniae  (resistant to ampicillin and cipro)   5/8/23 No growth    Creatinine   Date Value Ref Range Status   12/19/2023 0.57 0.51 - 0.95 mg/dL Final   09/21/2020 0.56 0.52 - 1.04 mg/dL Final     Cystoscopy Note 5/17/23: After informed consent was obtained patient was prepped and draped in the standard fashion.  The flexible cystoscope was inserted into a normal appearing urethral meatus.  The urothelium was carefully examined and there were some small whitish areas in the trigone consistent with squamous metaplasia.  There were no obvious tumors, masses, stones, foreign bodies, or other urothelial abnormalities noted.  Bilateral ureteral orifices were noted in the normal orthotopic position and both effluxed clear urine.  The cystoscope was retroflexed and the bladder neck was unremarkable.  The urethra was carefully examined upon removing the cystoscope and was " unremarkable.  Patient tolerated the procedure without complications noted.       CT urogram 5/11/23  FINDINGS: There are no stones seen within either kidney, either  ureter, or the bladder. There is no hydroureter or hydronephrosis.  There is no perinephric fat stranding. Kidneys are normal in size and  configuration. No suspicious filling defects seen in the opacified  intrarenal collecting systems, ureters, or bladder to suggest a  urothelial malignancy. The liver, spleen, adrenal glands, and pancreas  demonstrate no worrisome focal lesion. No free fluid. No free air in  the abdomen. There are no abdominal or pelvic lymph nodes that are  abnormal by size criteria. There are no dilated loops of small  intestine or large bowel to suggest ileus or obstruction.The  visualized lung bases are unremarkable. Bone windows reveal no  destructive lesions.                                                                    IMPRESSION:   1. No evidence for renal, ureteral, or bladder calculi.  2. No masses or suspicious filling defects within the opacified  urinary collecting system.         Assessment and Plan:   48 year old is a pleasant female who has ecurrent UTI, urinary urgency frequency, OAB, high tone pelvic floor dysfunction.    Plan:    - Consider oxybutynin for urinary frequency if becomes bothersome  - Consider PFPT in the future if urinary symptoms or pelvic floor dysfunction becomes bothersome.  - continue macrobid post coitally. Discussed risks/benefits of continuing macrobid including resistance to macrobid in the future. Patient would like to continue. Will notify when needs refills.   - Followup in 6 months.   - consider estrogen cream once goes through menopause.   - consider methenamine in the future if UTIs persist in the future.   - After discussing the assessment and plan with patient, patient verbalizes understanding and agrees to the above plan. All questions answered.     8 minutes spent on the date  of the encounter doing chart review, review of outside records, review of test results, interpretation of tests, patient visit and documentation.      Maria A Rock PA-C  Urology  December 22, 2023      Patient Care Team:  Surekha Garcia MD as PCP - General (Internal Medicine)  Surekha Garcia MD as Assigned PCP  Maria A Rock PA-C as Physician Assistant  Jerrica Rios APRN CNP as Assigned OBGYN Provider  Angelia Barajas MD as Assigned Surgical Provider

## 2024-03-21 ENCOUNTER — OFFICE VISIT (OUTPATIENT)
Dept: OBGYN | Facility: CLINIC | Age: 49
End: 2024-03-21
Payer: COMMERCIAL

## 2024-03-21 VITALS
WEIGHT: 106.2 LBS | HEIGHT: 62 IN | SYSTOLIC BLOOD PRESSURE: 110 MMHG | BODY MASS INDEX: 19.54 KG/M2 | DIASTOLIC BLOOD PRESSURE: 68 MMHG

## 2024-03-21 DIAGNOSIS — N76.1 CHRONIC VAGINITIS: ICD-10-CM

## 2024-03-21 DIAGNOSIS — Z91.018 TREE NUT ALLERGY: ICD-10-CM

## 2024-03-21 DIAGNOSIS — Z01.419 ENCOUNTER FOR GYNECOLOGICAL EXAMINATION WITHOUT ABNORMAL FINDING: Primary | ICD-10-CM

## 2024-03-21 DIAGNOSIS — Z12.4 SCREENING FOR CERVICAL CANCER: ICD-10-CM

## 2024-03-21 DIAGNOSIS — Z11.3 SCREEN FOR STD (SEXUALLY TRANSMITTED DISEASE): ICD-10-CM

## 2024-03-21 DIAGNOSIS — F41.9 ANXIETY: ICD-10-CM

## 2024-03-21 PROCEDURE — 86780 TREPONEMA PALLIDUM: CPT | Performed by: NURSE PRACTITIONER

## 2024-03-21 PROCEDURE — 87491 CHLMYD TRACH DNA AMP PROBE: CPT | Performed by: NURSE PRACTITIONER

## 2024-03-21 PROCEDURE — 87591 N.GONORRHOEAE DNA AMP PROB: CPT | Performed by: NURSE PRACTITIONER

## 2024-03-21 PROCEDURE — 87624 HPV HI-RISK TYP POOLED RSLT: CPT | Performed by: NURSE PRACTITIONER

## 2024-03-21 PROCEDURE — 87389 HIV-1 AG W/HIV-1&-2 AB AG IA: CPT | Performed by: NURSE PRACTITIONER

## 2024-03-21 PROCEDURE — G0145 SCR C/V CYTO,THINLAYER,RESCR: HCPCS | Performed by: NURSE PRACTITIONER

## 2024-03-21 PROCEDURE — 99396 PREV VISIT EST AGE 40-64: CPT | Performed by: NURSE PRACTITIONER

## 2024-03-21 PROCEDURE — 36415 COLL VENOUS BLD VENIPUNCTURE: CPT | Performed by: NURSE PRACTITIONER

## 2024-03-21 RX ORDER — FLUCONAZOLE 150 MG/1
150 TABLET ORAL
Qty: 4 TABLET | Refills: 1 | Status: SHIPPED | OUTPATIENT
Start: 2024-03-21

## 2024-03-21 RX ORDER — LORAZEPAM 0.5 MG/1
TABLET ORAL
Qty: 12 TABLET | Refills: 0 | Status: SHIPPED | OUTPATIENT
Start: 2024-03-21

## 2024-03-21 RX ORDER — EPINEPHRINE 0.3 MG/.3ML
0.3 INJECTION SUBCUTANEOUS PRN
Qty: 1 EACH | Refills: 1 | Status: SHIPPED | OUTPATIENT
Start: 2024-03-21

## 2024-03-21 ASSESSMENT — ANXIETY QUESTIONNAIRES
6. BECOMING EASILY ANNOYED OR IRRITABLE: NOT AT ALL
GAD7 TOTAL SCORE: 3
1. FEELING NERVOUS, ANXIOUS, OR ON EDGE: SEVERAL DAYS
IF YOU CHECKED OFF ANY PROBLEMS ON THIS QUESTIONNAIRE, HOW DIFFICULT HAVE THESE PROBLEMS MADE IT FOR YOU TO DO YOUR WORK, TAKE CARE OF THINGS AT HOME, OR GET ALONG WITH OTHER PEOPLE: NOT DIFFICULT AT ALL
3. WORRYING TOO MUCH ABOUT DIFFERENT THINGS: SEVERAL DAYS
2. NOT BEING ABLE TO STOP OR CONTROL WORRYING: NOT AT ALL
7. FEELING AFRAID AS IF SOMETHING AWFUL MIGHT HAPPEN: NOT AT ALL
5. BEING SO RESTLESS THAT IT IS HARD TO SIT STILL: SEVERAL DAYS
GAD7 TOTAL SCORE: 3

## 2024-03-21 ASSESSMENT — PATIENT HEALTH QUESTIONNAIRE - PHQ9
5. POOR APPETITE OR OVEREATING: NOT AT ALL
SUM OF ALL RESPONSES TO PHQ QUESTIONS 1-9: 0

## 2024-03-21 NOTE — PROGRESS NOTES
Jessie is a 48 year old  female who presents for annual exam.     Besides routine health maintenance, she has no other health concerns today .    HPI:  The patient's PCP is Dr. Surekha Garcia MD. patient here today for her annual GYN exam.  Mammogram is up-to-date she had this in December prior to her breast augmentation.  She accepts STD testing today.  She is requesting a Pap smear today.  Her cycles are regular.  She is requesting a refill of her Ativan for flying as well as Diflucan and an EpiPen.  She sees urology for postcoital UTIs and is doing well using Macrobid after intercourse.      GYNECOLOGIC HISTORY:    Patient's last menstrual period was 2024 (exact date).    Regular menses? yes  Menses every 28 days.  Length of menses: 4 days    Her current contraception method is: none.  She  reports that she has never smoked. She has never used smokeless tobacco.    Patient is sexually active.  STD testing offered?  Accepted  Last PHQ-9 score on record =       3/21/2024     2:53 PM   PHQ-9 SCORE   PHQ-9 Total Score 0     Last GAD7 score on record =       3/21/2024     2:53 PM   NICOLASA-7 SCORE   Total Score 3     Alcohol Score = 5    HEALTH MAINTENANCE:  Cholesterol:   Recent Labs   Lab Test 22  1440 20  0910   CHOL 224* 206*   HDL 68 84   * 110*   TRIG 137 58     Last Mammo:  23 , Result: Normal, Next Mammo:    Pap:   Lab Results   Component Value Date    GYNINTERP  2022     Negative for Intraepithelial Lesion or Malignancy (NILM)    GYNINTERP  2021     Negative for Intraepithelial Lesion or Malignancy (NILM)    PAP NIL 2020    PAP ASC-US 2019    PAP LSIL 2018     Colonoscopy:  22, Result: polyp, Next Colonoscopy: 5 years.  Dexa:  NA    Health maintenance updated:  yes    HISTORY:  OB History    Para Term  AB Living   0 0 0 0 0 0   SAB IAB Ectopic Multiple Live Births   0 0 0 0 0       Patient Active Problem List   Diagnosis     Encounter for other general counseling or advice on contraception    Allergic    CARDIOVASCULAR SCREENING; LDL GOAL LESS THAN 160    Anxiety    Environmental allergies    Health Care Home    Tree nut allergy    Encounter for surveillance of contraceptive pills    ASCUS with positive high risk HPV cervical    Cervical high risk HPV (human papillomavirus) test positive    Annual physical exam    Cervical cancer screening    Recurrent UTI    Screening for STD (sexually transmitted disease)    Perioral dermatitis    Urinary frequency    Urinary urgency    Menopausal syndrome (hot flashes)     Past Surgical History:   Procedure Laterality Date    BIOPSY  04/2013    moles removed by Naz and biopsied; benign    COLONOSCOPY N/A 06/13/2022    Procedure: COLONOSCOPY, WITH POLYPECTOMY AND BIOPSY;  Surgeon: Joie Barraza MD;  Location:  GI    CYSTOSCOPY      CO BREAST AUGMENTATION Bilateral     ZZC NONSPECIFIC PROCEDURE      wisdom teeth removed      Social History     Tobacco Use    Smoking status: Never    Smokeless tobacco: Never   Substance Use Topics    Alcohol use: Yes     Comment: occasionaly      Problem (# of Occurrences) Relation (Name,Age of Onset)    Heart Failure (1) Mother (April)    Diabetes (3) Mother (April): Type II, Father (Minesh): Type II, Maternal Uncle    Cerebrovascular Disease (1) Maternal Grandmother    Breast Cancer (1) Mother (April): Diagnosed Aug 2012; double mast + chem; remiss.    Prostate Cancer (1) Father (Minesh)    C.A.D. (1) Maternal Grandmother           Negative family history of: Hypertension, Cancer - colorectal              Current Outpatient Medications   Medication Sig    Ascorbic Acid (VITAMIN C PO) Take by mouth daily    Cholecalciferol (VITAMIN D PO) Take 1,000 Units by mouth daily    EPINEPHrine (ANY BX GENERIC EQUIV) 0.3 MG/0.3ML injection 2-pack Inject 0.3 mLs (0.3 mg) into the muscle as needed for anaphylaxis May repeat one time in 5-15 minutes if response to initial  "dose is inadequate.    ferrous sulfate (FEROSUL) 325 (65 Fe) MG tablet Take 325 mg by mouth daily (with breakfast)    fluconazole (DIFLUCAN) 150 MG tablet Take 1 tablet (150 mg) by mouth every 3 days    lactobacillus rhamnosus, GG, (CULTURELL) capsule Take 1 capsule by mouth 2 times daily    LORazepam (ATIVAN) 0.5 MG tablet 1-2 tablets once daily as needed for anxiety    nitroFURantoin macrocrystal-monohydrate (MACROBID) 100 MG capsule Take 1 capsule (100 mg) by mouth daily as needed (post coital antibiotic for UTI prevention)    ZYRTEC 10 MG OR TABS 1 TABLET DAILY     No current facility-administered medications for this visit.     Facility-Administered Medications Ordered in Other Visits   Medication    lidocaine (XYLOCAINE) 2 % external gel     Allergies   Allergen Reactions    Tree Nuts [Nuts] Anaphylaxis    Latex     Quinolones        Past medical, surgical, social and family histories were reviewed and updated in EPIC.    ROS:   12 point review of systems negative other than symptoms noted below or in the HPI.  No urinary frequency or dysuria, bladder or kidney problems, Normal menstrual cycles    EXAM:  /68   Ht 1.562 m (5' 1.5\")   Wt 48.2 kg (106 lb 3.2 oz)   LMP 03/02/2024 (Exact Date)   Breastfeeding No   BMI 19.74 kg/m     BMI: Body mass index is 19.74 kg/m .    PHYSICAL EXAM:  Constitutional:   Appearance: Well nourished, well developed, alert, in no acute distress  Chest:  Respiratory Effort:  Breathing unlabored  Cardiovascular:    Heart: Auscultation:  Regular rate, normal rhythm, no murmurs present  Breasts: Inspection of Breasts:  No lymphadenopathy present., Palpation of Breasts and Axillae:  No masses present on palpation, no breast tenderness., Axillary Lymph Nodes:  No lymphadenopathy present., No nodularity, asymmetry or nipple discharge bilaterally., and implants in place bilaterally  Skin:  General Inspection:  No rashes present, no lesions present, no areas of "  discoloration  Neurologic:    Mental Status:  Oriented X3.  Normal strength and tone, sensory exam                grossly normal, mentation intact and speech normal.    Psychiatric:   Mentation appears normal and affect normal/bright.         Pelvic Exam:  External Genitalia:     Normal appearance for age, no discharge present, no tenderness present, no inflammatory lesions present, color normal  Vagina:     Normal vaginal vault without central or paravaginal defects, no discharge present, no inflammatory lesions present, no masses present  Bladder:     Nontender to palpation  Urethra:   Urethral Body:  Urethra palpation normal, urethra structural support normal   Urethral Meatus:  No erythema or lesions present  Cervix:     Appearance healthy, no lesions present, nontender to palpation, no bleeding present  Uterus:     Uterus: firm, normal sized and nontender, anteverted in position.   Adnexa:     No adnexal tenderness present, no adnexal masses present  Perineum:     Perineum within normal limits, no evidence of trauma, no rashes or skin lesions present  Anus:     Anus within normal limits, no hemorrhoids present  Inguinal Lymph Nodes:     No lymphadenopathy present  Pubic Hair:     Normal pubic hair distribution for age  Genitalia and Groin:     No rashes present, no lesions present, no areas of discoloration, no masses present    COUNSELING:   Special attention given to:        Regular exercise       Healthy diet/nutrition       (Ladonna)menopause management    BMI: Body mass index is 19.74 kg/m .      ASSESSMENT:  48 year old female with satisfactory annual exam.    ICD-10-CM    1. Encounter for gynecological examination without abnormal finding  Z01.419       2. Screening for cervical cancer  Z12.4 Pap thin layer screen with HPV - recommended age 30 - 65 years      3. Screen for STD (sexually transmitted disease)  Z11.3 Chlamydia trachomatis PCR     HIV Antigen Antibody Combo Cascade     Neisseria gonorrhoeae  PCR     Treponema Abs w Reflex to RPR and Titer     HIV Antigen Antibody Combo Cascade     Treponema Abs w Reflex to RPR and Titer      4. Anxiety  F41.9 LORazepam (ATIVAN) 0.5 MG tablet      5. Tree nut allergy  Z91.018 EPINEPHrine (ANY BX GENERIC EQUIV) 0.3 MG/0.3ML injection 2-pack      6. Chronic vaginitis  N76.1 fluconazole (DIFLUCAN) 150 MG tablet          PLAN:  48-year-old female with a normal GYN exam.  Pap smear was collected and if it is normal she can repeat in 1 year per her preference.  STD testing was completed.  She is to continue with annual mammograms.  Medications were refilled for her today.    GEORGES Patel CNP

## 2024-03-22 LAB
C TRACH DNA SPEC QL NAA+PROBE: NEGATIVE
HIV 1+2 AB+HIV1 P24 AG SERPL QL IA: NONREACTIVE
N GONORRHOEA DNA SPEC QL NAA+PROBE: NEGATIVE
T PALLIDUM AB SER QL: NONREACTIVE

## 2024-03-26 LAB
BKR LAB AP GYN ADEQUACY: NORMAL
BKR LAB AP GYN INTERPRETATION: NORMAL
BKR LAB AP HPV REFLEX: NORMAL
BKR LAB AP LMP: NORMAL
BKR LAB AP PREVIOUS ABNL DX: NORMAL
BKR LAB AP PREVIOUS ABNORMAL: NORMAL
PATH REPORT.COMMENTS IMP SPEC: NORMAL
PATH REPORT.COMMENTS IMP SPEC: NORMAL
PATH REPORT.RELEVANT HX SPEC: NORMAL

## 2024-05-22 NOTE — NURSING NOTE
"Initial /62 (BP Location: Left arm, Patient Position: Chair, Cuff Size: Adult Regular)   Pulse 56   Temp 98.1  F (36.7  C) (Tympanic)   Resp 12   Ht 1.562 m (5' 1.5\")   Wt 46 kg (101 lb 6.4 oz)   LMP 06/30/2019 (Exact Date)   Breastfeeding? No   BMI 18.85 kg/m   Estimated body mass index is 18.85 kg/m  as calculated from the following:    Height as of this encounter: 1.562 m (5' 1.5\").    Weight as of this encounter: 46 kg (101 lb 6.4 oz). .    Era Rosen CMA ([facility])    " Auditory hallucinations

## 2024-06-20 ENCOUNTER — OFFICE VISIT (OUTPATIENT)
Dept: UROLOGY | Facility: CLINIC | Age: 49
End: 2024-06-20
Payer: COMMERCIAL

## 2024-06-20 VITALS — SYSTOLIC BLOOD PRESSURE: 117 MMHG | OXYGEN SATURATION: 100 % | HEART RATE: 81 BPM | DIASTOLIC BLOOD PRESSURE: 74 MMHG

## 2024-06-20 DIAGNOSIS — R39.15 URINARY URGENCY: ICD-10-CM

## 2024-06-20 DIAGNOSIS — M62.89 HIGH-TONE PELVIC FLOOR DYSFUNCTION: ICD-10-CM

## 2024-06-20 DIAGNOSIS — R35.0 URINARY FREQUENCY: ICD-10-CM

## 2024-06-20 DIAGNOSIS — N39.0 RECURRENT UTI: Primary | ICD-10-CM

## 2024-06-20 PROCEDURE — 99214 OFFICE O/P EST MOD 30 MIN: CPT | Performed by: PHYSICIAN ASSISTANT

## 2024-06-20 NOTE — PROGRESS NOTES
Urology Clinic      Name: Jessie Karimi    MRN: [id number]   YOB: 1975  Accompanied at today's visit by:self                 Chief Complaint:   Aleyda          History of Present Illness:   June 20, 2024    HISTORY:   We have been following 48 year old Jessie Karimi for  recurrent UTI, urinary urgency frequency, OAB, high tone pelvic floor dysfunction. Had CT urogram on 5/11/23 that was unremarkable from urologic standpoint. On 5/17/23, had cystoscopy with Dr. Ann Simms that showed some small whitish areas in the trigone consistent with squamous metaplasia but otherwise unremarkable. Has continued on post coital macrobid for some time that was started by other care team in the past. Last seen on 12/22/23 and wanted to continue on post coital macrobid. Of note, last UTI was on 3/3/23 showing Klebsiella pneumoniae. Has tried to cut back on carbonated beverages and has noted improvement in her bowels and urinary urgency/frequency symptoms. Of note, d-mannose has worsened her bladder symptoms in the past. Patient voices no other concerns at this time.           Allergies:     Allergies   Allergen Reactions    Tree Nuts [Nuts] Anaphylaxis    Latex     Quinolones             Medications:     Current Outpatient Medications   Medication Sig Dispense Refill    Ascorbic Acid (VITAMIN C PO) Take by mouth daily      Cholecalciferol (VITAMIN D PO) Take 1,000 Units by mouth daily      EPINEPHrine (ANY BX GENERIC EQUIV) 0.3 MG/0.3ML injection 2-pack Inject 0.3 mLs (0.3 mg) into the muscle as needed for anaphylaxis May repeat one time in 5-15 minutes if response to initial dose is inadequate. 1 each 1    ferrous sulfate (FEROSUL) 325 (65 Fe) MG tablet Take 325 mg by mouth daily (with breakfast)      fluconazole (DIFLUCAN) 150 MG tablet Take 1 tablet (150 mg) by mouth every 3 days 4 tablet 1    lactobacillus rhamnosus, GG, (CULTURELL) capsule Take 1 capsule by mouth 2 times daily      LORazepam (ATIVAN) 0.5  MG tablet 1-2 tablets once daily as needed for anxiety 12 tablet 0    nitroFURantoin macrocrystal-monohydrate (MACROBID) 100 MG capsule Take 1 capsule (100 mg) by mouth daily as needed (post coital antibiotic for UTI prevention) 30 capsule 0    ZYRTEC 10 MG OR TABS 1 TABLET DAILY 30 0     No current facility-administered medications for this visit.     Facility-Administered Medications Ordered in Other Visits   Medication Dose Route Frequency Provider Last Rate Last Admin    lidocaine (XYLOCAINE) 2 % external gel   Urethral Once Angelia Barajas MD                   Past  Surgical History:     Past Surgical History:   Procedure Laterality Date    BIOPSY  04/2013    moles removed by Naz and biopsied; benign    COLONOSCOPY N/A 06/13/2022    Procedure: COLONOSCOPY, WITH POLYPECTOMY AND BIOPSY;  Surgeon: Joie Barraza MD;  Location:  GI    CYSTOSCOPY      PA BREAST AUGMENTATION Bilateral     ZZC NONSPECIFIC PROCEDURE      wisdom teeth removed             Physical Exam:     Vitals:    06/20/24 0839   BP: 117/74   Pulse: 81   SpO2: 100%     PSYCH: NAD  EYES: EOMI  NEURO: AAO x3    LABS:   Creatinine   Date Value Ref Range Status   12/19/2023 0.57 0.51 - 0.95 mg/dL Final   09/21/2020 0.56 0.52 - 1.04 mg/dL Final            Assessment and Plan:   48 year old is a pleasant female who has recurrent UTI, urinary urgency frequency, OAB, high tone pelvic floor dysfunction.     Plan:   - Discussed risks/benifits of antibiotic for UTI prophylaxis and also risk of resistance. Discussed risks/benefits and potential side effects of long term macrobid use. Patient verbalizes understanding and would like to continue on macrobid currently.   - Consider switching to methenamine with vitamin C if has breakthrough UTIs in the future  - continue bladder irritants  - not interested in OAB medications at this time. consider oxybutynin for urinary frequency, patient if worsens in the future.  - consider PFPT in the future.    - follow-up in 6 months  - After discussing the assessment and plan with patient, patient verbalizes understanding and agrees to the above plan. All questions answered.       21 minutes spent on the date of the encounter doing chart review, review of Dr. Barajas's note, review of labs, review of test results, interpretation of tests, patient visit and documentation.      Maria A Rock PA-C  Urology  June 20, 2024      Patient Care Team:  Surekha Garcia MD as PCP - General (Internal Medicine)  Surekha Garcia MD as Assigned PCP  Maria A Rock PA-C as Physician Assistant  Jerrica Rios APRN CNP as Assigned OBGYN Provider  Maria A Rock PA-C as Assigned Surgical Provider

## 2024-06-20 NOTE — LETTER
6/20/2024       RE: Jessie Karimi  [address]  [facility] [address]     Dear Colleague,    Thank you for referring your patient, Jessie Karimi, to the [facility] UROLOGY [facility] [place] at [facility]. Please see a copy of my visit note below.    Urology Clinic      Name: Jessie Karimi    MRN: [id number]   YOB: 1975  Accompanied at today's visit by:self                 Chief Complaint:   Aleyda          History of Present Illness:   June 20, 2024    HISTORY:   We have been following 48 year old Jessie Karimi for  recurrent UTI, urinary urgency frequency, OAB, high tone pelvic floor dysfunction. Had CT urogram on 5/11/23 that was unremarkable from urologic standpoint. On 5/17/23, had cystoscopy with Dr. Barajas. Demi that showed some small whitish areas in the trigone consistent with squamous metaplasia but otherwise unremarkable. Has continued on post coital macrobid for some time that was started by other care team in the past. Last seen on 12/22/23 and wanted to continue on post coital macrobid. Of note, last UTI was on 3/3/23 showing Klebsiella pneumoniae. Has tried to cut back on carbonated beverages and has noted improvement in her bowels and urinary urgency/frequency symptoms. Of note, d-mannose has worsened her bladder symptoms in the past. Patient voices no other concerns at this time.           Allergies:     Allergies   Allergen Reactions    Tree Nuts [Nuts] Anaphylaxis    Latex     Quinolones             Medications:     Current Outpatient Medications   Medication Sig Dispense Refill    Ascorbic Acid (VITAMIN C PO) Take by mouth daily      Cholecalciferol (VITAMIN D PO) Take 1,000 Units by mouth daily      EPINEPHrine (ANY BX GENERIC EQUIV) 0.3 MG/0.3ML injection 2-pack Inject 0.3 mLs (0.3 mg) into the muscle as needed for anaphylaxis May repeat one time in 5-15 minutes if response to initial dose is inadequate. 1 each 1     ferrous sulfate (FEROSUL) 325 (65 Fe) MG tablet Take 325 mg by mouth daily (with breakfast)      fluconazole (DIFLUCAN) 150 MG tablet Take 1 tablet (150 mg) by mouth every 3 days 4 tablet 1    lactobacillus rhamnosus, GG, (CULTURELL) capsule Take 1 capsule by mouth 2 times daily      LORazepam (ATIVAN) 0.5 MG tablet 1-2 tablets once daily as needed for anxiety 12 tablet 0    nitroFURantoin macrocrystal-monohydrate (MACROBID) 100 MG capsule Take 1 capsule (100 mg) by mouth daily as needed (post coital antibiotic for UTI prevention) 30 capsule 0    ZYRTEC 10 MG OR TABS 1 TABLET DAILY 30 0     No current facility-administered medications for this visit.     Facility-Administered Medications Ordered in Other Visits   Medication Dose Route Frequency Provider Last Rate Last Admin    lidocaine (XYLOCAINE) 2 % external gel   Urethral Once Angelia Barajas MD                   Past  Surgical History:     Past Surgical History:   Procedure Laterality Date    BIOPSY  04/2013    moles removed by [facility] and biopsied; benign    COLONOSCOPY N/A 06/13/2022    Procedure: COLONOSCOPY, WITH POLYPECTOMY AND BIOPSY;  Surgeon: Joie Barraza MD;  Location:  GI    CYSTOSCOPY      IN BREAST AUGMENTATION Bilateral     ZZC NONSPECIFIC PROCEDURE      wisdom teeth removed             Physical Exam:     Vitals:    06/20/24 0839   BP: 117/74   Pulse: 81   SpO2: 100%     PSYCH: NAD  EYES: EOMI  NEURO: AAO x3    LABS:   Creatinine   Date Value Ref Range Status   12/19/2023 0.57 0.51 - 0.95 mg/dL Final   09/21/2020 0.56 0.52 - 1.04 mg/dL Final            Assessment and Plan:   48 year old is a pleasant female who has recurrent UTI, urinary urgency frequency, OAB, high tone pelvic floor dysfunction.     Plan:   - Discussed risks/benifits of antibiotic for UTI prophylaxis and also risk of resistance. Discussed risks/benefits and potential side effects of long term macrobid use. Patient verbalizes understanding and would like to  continue on macrobid currently.   - Consider switching to methenamine with vitamin C if has breakthrough UTIs in the future  - continue bladder irritants  - not interested in OAB medications at this time. consider oxybutynin for urinary frequency, patient if worsens in the future.  - consider PFPT in the future.   - follow-up in 6 months  - After discussing the assessment and plan with patient, patient verbalizes understanding and agrees to the above plan. All questions answered.       21 minutes spent on the date of the encounter doing chart review, review of Dr. Barajas's note, review of labs, review of test results, interpretation of tests, patient visit and documentation.      Maria A Rock PA-C  Urology  June 20, 2024      Patient Care Team:  Surekha Garcia MD as PCP - General (Internal Medicine)  Surekha Garcia MD as Assigned PCP  Maria A Rock PA-C as Physician Assistant  Jerrica Rios APRN CNP as Assigned OBGYN Provider  Maria A Rock PA-C as Assigned Surgical Provider

## 2024-09-03 DIAGNOSIS — N39.0 RECURRENT UTI: ICD-10-CM

## 2024-09-03 RX ORDER — NITROFURANTOIN 25; 75 MG/1; MG/1
100 CAPSULE ORAL DAILY PRN
Qty: 30 CAPSULE | Refills: 0 | Status: SHIPPED | OUTPATIENT
Start: 2024-09-03

## 2024-12-19 ENCOUNTER — OFFICE VISIT (OUTPATIENT)
Dept: UROLOGY | Facility: CLINIC | Age: 49
End: 2024-12-19
Payer: COMMERCIAL

## 2024-12-19 VITALS
HEIGHT: 61 IN | SYSTOLIC BLOOD PRESSURE: 111 MMHG | DIASTOLIC BLOOD PRESSURE: 71 MMHG | BODY MASS INDEX: 20.01 KG/M2 | WEIGHT: 106 LBS

## 2024-12-19 DIAGNOSIS — R39.15 URINARY URGENCY: ICD-10-CM

## 2024-12-19 DIAGNOSIS — R35.0 URINARY FREQUENCY: ICD-10-CM

## 2024-12-19 DIAGNOSIS — N39.0 RECURRENT UTI: Primary | ICD-10-CM

## 2024-12-19 DIAGNOSIS — M62.89 HIGH-TONE PELVIC FLOOR DYSFUNCTION: ICD-10-CM

## 2024-12-19 RX ORDER — NITROFURANTOIN 25; 75 MG/1; MG/1
100 CAPSULE ORAL DAILY PRN
Qty: 30 CAPSULE | Refills: 1 | Status: SHIPPED | OUTPATIENT
Start: 2024-12-19

## 2024-12-19 ASSESSMENT — PAIN SCALES - GENERAL: PAINLEVEL_OUTOF10: NO PAIN (0)

## 2024-12-19 NOTE — LETTER
12/19/2024       RE: Jessie Karimi  [address]  [facility] [address]     Dear Colleague,    Thank you for referring your patient, Jessie Karimi, to the [facility] UROLOGY CLINIC [place] at [facility]. Please see a copy of my visit note below.    Urology Clinic      Name: Jessie Karimi    MRN: [id number]   YOB: 1975  Accompanied at today's visit by:self                 Chief Complaint:   Follow-up          History of Present Illness:   December 19, 2024    HISTORY:   We have been following 49 year old Jessie Karimi for recurrent UTI, urinary urgency frequency, OAB, high tone pelvic floor dysfunction. Had CT urogram on 5/11/23 that was unremarkable from urologic standpoint. On 5/17/23, had cystoscopy with Dr. Barajas. Demi that showed some small whitish areas in the trigone consistent with squamous metaplasia but otherwise unremarkable. Has continued on post coital macrobid for some time that was started by other care team in the past. Last seen on 6/20/24 reported her OAB sx and bowel issues had improved with reducing carbonated beverages. However d-mannose worsened her bladder sx. Continues on post coital macrobd. Of note, has been discussed risks/benefits and potential side effects of macrobid prophylaxis and patient understands these risks. Patient here today for follow-up. Per EMR, has not had UTI in remote past. States she tried to stop the post coital macrobid but sx returned, so she resumed this. Since then has been doing well. OAB sx and bowel significantly improved now that she has completely cut out carbonated beverages. Patient voices no other concerns at this time.          Allergies:     Allergies   Allergen Reactions     Tree Nuts [Nuts] Anaphylaxis     Latex      Quinolones             Medications:     Current Outpatient Medications   Medication Sig Dispense Refill     Ascorbic Acid (VITAMIN C PO) Take by mouth  "daily       Cholecalciferol (VITAMIN D PO) Take 1,000 Units by mouth daily       EPINEPHrine (ANY BX GENERIC EQUIV) 0.3 MG/0.3ML injection 2-pack Inject 0.3 mLs (0.3 mg) into the muscle as needed for anaphylaxis May repeat one time in 5-15 minutes if response to initial dose is inadequate. 1 each 1     ferrous sulfate (FEROSUL) 325 (65 Fe) MG tablet Take 325 mg by mouth daily (with breakfast)       fluconazole (DIFLUCAN) 150 MG tablet Take 1 tablet (150 mg) by mouth every 3 days 4 tablet 1     lactobacillus rhamnosus, GG, (CULTURELL) capsule Take 1 capsule by mouth 2 times daily       LORazepam (ATIVAN) 0.5 MG tablet 1-2 tablets once daily as needed for anxiety 12 tablet 0     nitroFURantoin macrocrystal-monohydrate (MACROBID) 100 MG capsule Take 1 capsule (100 mg) by mouth daily as needed (post coital antibiotic for UTI prevention). 30 capsule 1     ZYRTEC 10 MG OR TABS 1 TABLET DAILY 30 0     No current facility-administered medications for this visit.     Facility-Administered Medications Ordered in Other Visits   Medication Dose Route Frequency Provider Last Rate Last Admin     lidocaine (XYLOCAINE) 2 % external gel   Urethral Once Karl, Angelia Botello MD                   Past  Surgical History:     Past Surgical History:   Procedure Laterality Date     BIOPSY  04/2013    moles removed by [facility] and biopsied; benign     COLONOSCOPY N/A 06/13/2022    Procedure: COLONOSCOPY, WITH POLYPECTOMY AND BIOPSY;  Surgeon: Joie Barraza MD;  Location:  GI     CYSTOSCOPY       TX BREAST AUGMENTATION Bilateral      ZZC NONSPECIFIC PROCEDURE      wisdom teeth removed             Physical Exam:     Vitals:    12/19/24 0844   BP: 111/71   Weight: 48.1 kg (106 lb)   Height: 1.562 m (5' 1.5\")     PSYCH: NAD  EYES: EOMI  NEURO: AAO x3    LABS:   Creatinine   Date Value Ref Range Status   12/19/2023 0.57 0.51 - 0.95 mg/dL Final   09/21/2020 0.56 0.52 - 1.04 mg/dL Final            Assessment and Plan:   49 year old is a " pleasant female who has recurrent UTI, urinary urgency frequency, OAB, high tone pelvic floor dysfunction.    Plan:  - Continue post coital macrobid; renewed today.  - contact clinic in the future if having UTI sx and macorid not helping.   - Consider switching to methenamine with vitamin C if has breakthrough UTIs in the future  - Consider oxybutynin for urinary frequency, patient if these sx worsen in the future.  - consider PFPT in the future.   - follow-up in 1 year or sooner if needed.   - After discussing the assessment and plan with patient, patient verbalizes understanding and agrees to the above plan. All questions answered.     16 minutes spent on the date of the encounter doing chart review, review of labs, renewing macrobid, review of test results, interpretation of tests, patient visit and documentation.      Maria A Wiggins PA-C  Urology  December 19, 2024      Patient Care Team:  Jerriac Rios APRN CNP as PCP - Surekha Flores MD as Assigned PCP  Maria A Wiggins PA-C as Physician Assistant  Jerrica Rios APRN CNP as Assigned OBGYN Provider  Maria A Wiggins PA-C as Assigned Surgical Provider           Again, thank you for allowing me to participate in the care of your patient.      Sincerely,    MARI AA WIGGINS PA-C

## 2024-12-19 NOTE — PROGRESS NOTES
Urology Clinic      Name: Jessie Karimi    MRN: [id number]   YOB: 1975  Accompanied at today's visit by:self                 Chief Complaint:   Follow-up          History of Present Illness:   December 19, 2024    HISTORY:   We have been following 49 year old Jessie Karimi for recurrent UTI, urinary urgency frequency, OAB, high tone pelvic floor dysfunction. Had CT urogram on 5/11/23 that was unremarkable from urologic standpoint. On 5/17/23, had cystoscopy with Dr. Ann Simms that showed some small whitish areas in the trigone consistent with squamous metaplasia but otherwise unremarkable. Has continued on post coital macrobid for some time that was started by other care team in the past. Last seen on 6/20/24 reported her OAB sx and bowel issues had improved with reducing carbonated beverages. However d-mannose worsened her bladder sx. Continues on post coital macrobd. Of note, has been discussed risks/benefits and potential side effects of macrobid prophylaxis and patient understands these risks. Patient here today for follow-up. Per EMR, has not had UTI in remote past. States she tried to stop the post coital macrobid but sx returned, so she resumed this. Since then has been doing well. OAB sx and bowel significantly improved now that she has completely cut out carbonated beverages. Patient voices no other concerns at this time.          Allergies:     Allergies   Allergen Reactions    Tree Nuts [Nuts] Anaphylaxis    Latex     Quinolones             Medications:     Current Outpatient Medications   Medication Sig Dispense Refill    Ascorbic Acid (VITAMIN C PO) Take by mouth daily      Cholecalciferol (VITAMIN D PO) Take 1,000 Units by mouth daily      EPINEPHrine (ANY BX GENERIC EQUIV) 0.3 MG/0.3ML injection 2-pack Inject 0.3 mLs (0.3 mg) into the muscle as needed for anaphylaxis May repeat one time in 5-15 minutes if response to initial dose is inadequate. 1 each 1    ferrous sulfate  "(FEROSUL) 325 (65 Fe) MG tablet Take 325 mg by mouth daily (with breakfast)      fluconazole (DIFLUCAN) 150 MG tablet Take 1 tablet (150 mg) by mouth every 3 days 4 tablet 1    lactobacillus rhamnosus, GG, (CULTURELL) capsule Take 1 capsule by mouth 2 times daily      LORazepam (ATIVAN) 0.5 MG tablet 1-2 tablets once daily as needed for anxiety 12 tablet 0    nitroFURantoin macrocrystal-monohydrate (MACROBID) 100 MG capsule Take 1 capsule (100 mg) by mouth daily as needed (post coital antibiotic for UTI prevention). 30 capsule 1    ZYRTEC 10 MG OR TABS 1 TABLET DAILY 30 0     No current facility-administered medications for this visit.     Facility-Administered Medications Ordered in Other Visits   Medication Dose Route Frequency Provider Last Rate Last Admin    lidocaine (XYLOCAINE) 2 % external gel   Urethral Once Karl, Angelia Botello MD                   Past  Surgical History:     Past Surgical History:   Procedure Laterality Date    BIOPSY  04/2013    moles removed by [facility] and biopsied; benign    COLONOSCOPY N/A 06/13/2022    Procedure: COLONOSCOPY, WITH POLYPECTOMY AND BIOPSY;  Surgeon: Joie Barraza MD;  Location:  GI    CYSTOSCOPY      AR BREAST AUGMENTATION Bilateral     ZZC NONSPECIFIC PROCEDURE      wisdom teeth removed             Physical Exam:     Vitals:    12/19/24 0844   BP: 111/71   Weight: 48.1 kg (106 lb)   Height: 1.562 m (5' 1.5\")     PSYCH: NAD  EYES: EOMI  NEURO: AAO x3    LABS:   Creatinine   Date Value Ref Range Status   12/19/2023 0.57 0.51 - 0.95 mg/dL Final   09/21/2020 0.56 0.52 - 1.04 mg/dL Final            Assessment and Plan:   49 year old is a pleasant female who has recurrent UTI, urinary urgency frequency, OAB, high tone pelvic floor dysfunction.    Plan:  - Continue post coital macrobid; renewed today.  - contact clinic in the future if having UTI sx and macorid not helping.   - Consider switching to methenamine with vitamin C if has breakthrough UTIs in the " future  - Consider oxybutynin for urinary frequency, patient if these sx worsen in the future.  - consider PFPT in the future.   - follow-up in 1 year or sooner if needed.   - After discussing the assessment and plan with patient, patient verbalizes understanding and agrees to the above plan. All questions answered.     16 minutes spent on the date of the encounter doing chart review, review of labs, renewing macrobid, review of test results, interpretation of tests, patient visit and documentation.      Maria A Rock PA-C  Urology  December 19, 2024      Patient Care Team:  Jerrica Rios APRN CNP as PCP - Surekha Flores MD as Assigned PCP  Maria A Rock PA-C as Physician Assistant  Jerrica Rios APRN CNP as Assigned OBGYN Provider  Maria A Rock PA-C as Assigned Surgical Provider

## 2025-01-09 ENCOUNTER — ANCILLARY PROCEDURE (OUTPATIENT)
Dept: MAMMOGRAPHY | Facility: CLINIC | Age: 50
End: 2025-01-09
Attending: INTERNAL MEDICINE
Payer: COMMERCIAL

## 2025-01-09 DIAGNOSIS — Z12.31 VISIT FOR SCREENING MAMMOGRAM: ICD-10-CM

## 2025-01-09 PROCEDURE — 77067 SCR MAMMO BI INCL CAD: CPT | Mod: TC | Performed by: RADIOLOGY

## 2025-01-09 PROCEDURE — 77063 BREAST TOMOSYNTHESIS BI: CPT | Mod: TC | Performed by: RADIOLOGY

## 2025-05-04 ENCOUNTER — HEALTH MAINTENANCE LETTER (OUTPATIENT)
Age: 50
End: 2025-05-04

## 2025-05-28 ENCOUNTER — OFFICE VISIT (OUTPATIENT)
Dept: OBGYN | Facility: CLINIC | Age: 50
End: 2025-05-28
Attending: NURSE PRACTITIONER
Payer: COMMERCIAL

## 2025-05-28 VITALS
BODY MASS INDEX: 20.32 KG/M2 | DIASTOLIC BLOOD PRESSURE: 64 MMHG | HEIGHT: 62 IN | SYSTOLIC BLOOD PRESSURE: 108 MMHG | WEIGHT: 110.4 LBS

## 2025-05-28 DIAGNOSIS — Z13.220 ENCOUNTER FOR LIPID SCREENING FOR CARDIOVASCULAR DISEASE: ICD-10-CM

## 2025-05-28 DIAGNOSIS — Z13.6 ENCOUNTER FOR LIPID SCREENING FOR CARDIOVASCULAR DISEASE: ICD-10-CM

## 2025-05-28 DIAGNOSIS — Z91.018 TREE NUT ALLERGY: ICD-10-CM

## 2025-05-28 DIAGNOSIS — Z13.228 SCREENING FOR METABOLIC DISORDER: ICD-10-CM

## 2025-05-28 DIAGNOSIS — Z12.4 SCREENING FOR CERVICAL CANCER: ICD-10-CM

## 2025-05-28 DIAGNOSIS — Z01.84 IMMUNITY STATUS TESTING: ICD-10-CM

## 2025-05-28 DIAGNOSIS — Z13.0 SCREENING FOR DISORDER OF BLOOD AND BLOOD-FORMING ORGANS: ICD-10-CM

## 2025-05-28 DIAGNOSIS — N76.1 CHRONIC VAGINITIS: ICD-10-CM

## 2025-05-28 DIAGNOSIS — Z01.419 ENCOUNTER FOR GYNECOLOGICAL EXAMINATION WITHOUT ABNORMAL FINDING: Primary | ICD-10-CM

## 2025-05-28 DIAGNOSIS — F41.9 ANXIETY: ICD-10-CM

## 2025-05-28 DIAGNOSIS — Z13.29 SCREENING FOR THYROID DISORDER: ICD-10-CM

## 2025-05-28 LAB
ERYTHROCYTE [DISTWIDTH] IN BLOOD BY AUTOMATED COUNT: 12.5 % (ref 10–15)
EST. AVERAGE GLUCOSE BLD GHB EST-MCNC: 103 MG/DL
HBA1C MFR BLD: 5.2 % (ref 0–5.6)
HCT VFR BLD AUTO: 39.6 % (ref 35–47)
HGB BLD-MCNC: 13 G/DL (ref 11.7–15.7)
MCH RBC QN AUTO: 29.9 PG (ref 26.5–33)
MCHC RBC AUTO-ENTMCNC: 32.8 G/DL (ref 31.5–36.5)
MCV RBC AUTO: 91 FL (ref 78–100)
MEV IGG SER IA-ACNC: 88.1 AU/ML
MEV IGG SER IA-ACNC: POSITIVE
MUMPS ANTIBODY IGG INSTRUMENT VALUE: 93.2 AU/ML
MUV IGG SER QL IA: POSITIVE
PLATELET # BLD AUTO: 192 10E3/UL (ref 150–450)
RBC # BLD AUTO: 4.35 10E6/UL (ref 3.8–5.2)
RUBV IGG SERPL QL IA: 1.24 INDEX
RUBV IGG SERPL QL IA: POSITIVE
WBC # BLD AUTO: 6.8 10E3/UL (ref 4–11)

## 2025-05-28 PROCEDURE — 3074F SYST BP LT 130 MM HG: CPT | Performed by: NURSE PRACTITIONER

## 2025-05-28 PROCEDURE — 80061 LIPID PANEL: CPT | Performed by: NURSE PRACTITIONER

## 2025-05-28 PROCEDURE — 83036 HEMOGLOBIN GLYCOSYLATED A1C: CPT | Performed by: NURSE PRACTITIONER

## 2025-05-28 PROCEDURE — 86762 RUBELLA ANTIBODY: CPT | Performed by: NURSE PRACTITIONER

## 2025-05-28 PROCEDURE — 36415 COLL VENOUS BLD VENIPUNCTURE: CPT | Performed by: NURSE PRACTITIONER

## 2025-05-28 PROCEDURE — 84443 ASSAY THYROID STIM HORMONE: CPT | Performed by: NURSE PRACTITIONER

## 2025-05-28 PROCEDURE — 80053 COMPREHEN METABOLIC PANEL: CPT | Performed by: NURSE PRACTITIONER

## 2025-05-28 PROCEDURE — 86765 RUBEOLA ANTIBODY: CPT | Performed by: NURSE PRACTITIONER

## 2025-05-28 PROCEDURE — 99459 PELVIC EXAMINATION: CPT | Performed by: NURSE PRACTITIONER

## 2025-05-28 PROCEDURE — 3044F HG A1C LEVEL LT 7.0%: CPT | Performed by: NURSE PRACTITIONER

## 2025-05-28 PROCEDURE — 86735 MUMPS ANTIBODY: CPT | Performed by: NURSE PRACTITIONER

## 2025-05-28 PROCEDURE — 85027 COMPLETE CBC AUTOMATED: CPT | Performed by: NURSE PRACTITIONER

## 2025-05-28 PROCEDURE — 99396 PREV VISIT EST AGE 40-64: CPT | Performed by: NURSE PRACTITIONER

## 2025-05-28 PROCEDURE — 99213 OFFICE O/P EST LOW 20 MIN: CPT | Mod: 25 | Performed by: NURSE PRACTITIONER

## 2025-05-28 PROCEDURE — 87624 HPV HI-RISK TYP POOLED RSLT: CPT | Performed by: NURSE PRACTITIONER

## 2025-05-28 PROCEDURE — 3078F DIAST BP <80 MM HG: CPT | Performed by: NURSE PRACTITIONER

## 2025-05-28 RX ORDER — LORAZEPAM 0.5 MG/1
TABLET ORAL
Qty: 12 TABLET | Refills: 0 | Status: SHIPPED | OUTPATIENT
Start: 2025-05-28

## 2025-05-28 RX ORDER — PROGESTERONE 100 MG/1
CAPSULE ORAL
COMMUNITY
Start: 2025-04-29

## 2025-05-28 RX ORDER — ITRACONAZOLE 100 MG/1
100 CAPSULE ORAL
COMMUNITY
Start: 2025-04-23

## 2025-05-28 RX ORDER — FLUCONAZOLE 150 MG/1
150 TABLET ORAL
Qty: 4 TABLET | Refills: 1 | Status: SHIPPED | OUTPATIENT
Start: 2025-05-28

## 2025-05-28 RX ORDER — EPINEPHRINE 0.3 MG/.3ML
0.3 INJECTION SUBCUTANEOUS PRN
Qty: 1 EACH | Refills: 1 | Status: SHIPPED | OUTPATIENT
Start: 2025-05-28

## 2025-05-28 NOTE — PROGRESS NOTES
Jessie is a 49 year old  female who presents for annual exam.     Besides routine health maintenance, she has no other health concerns today .    HPI:  The patient's PCP is  GEORGES Patel CNP.  Patient here today for her annual GYN exam.  Mammogram was negative in January.  She is fasting for blood work today and is wondering if she needs an MMR update.  She is sexually active and declines STD testing.  She has normal menstrual cycles.  She does need a refill of a few of her medications that we fill for her.  She started on oral progesterone that has helped her sleep and has been using vaginal estrogen tablets but only use them for about 6 weeks with no relief from her chronic yeast infections.    Pap smear is due today.      GYNECOLOGIC HISTORY:    Patient's last menstrual period was 2025 (exact date).    Regular menses? yes  Menses every 28-30 days.  Length of menses: 5 days    Her current contraception method is: none.  She  reports that she has never smoked. She has never used smokeless tobacco.    Patient is sexually active.  STD testing offered?  Declined  Last PHQ-9 score on record =       3/21/2024     2:53 PM   PHQ-9 SCORE   PHQ-9 Total Score 0     Last GAD7 score on record =       3/21/2024     2:53 PM   NICOLASA-7 SCORE   Total Score 3       HEALTH MAINTENANCE:  Cholesterol: (  Cholesterol   Date Value Ref Range Status   2022 224 (H) <200 mg/dL Final   2020 206 (H) <200 mg/dL Final     Comment:     Desirable:       <200 mg/dl   2019 178 <200 mg/dL Final     Pap:   Lab Results   Component Value Date    GYNINTERP  2024     Negative for Intraepithelial Lesion or Malignancy (NILM)    GYNINTERP  2022     Negative for Intraepithelial Lesion or Malignancy (NILM)    GYNINTERP  2021     Negative for Intraepithelial Lesion or Malignancy (NILM)    PAP NIL 2020    PAP ASC-US 2019    PAP LSIL 2018        Health maintenance updated:  yes    Care  Gaps    Overdue     MAR 21  2025 YEARLY PREVENTIVE VISIT (Yearly)  Last completed: Mar 21, 2024   MAR 21  2025 HPV TEST (Once)  Last completed: Mar 21, 2024   MAR 21  2025 PAP (Yearly)  Last completed: Mar 21, 2024      Upcoming     2026 MAMMO SCREENING (Yearly)  Last completed: 2025   SEP 26  2026 ZOSTER VACCINE (1 of 2)     DEC 19  2026 DIABETES SCREENING (Every 3 Years)  Last completed: Dec 19, 2023   [place] 13  2027 COLORECTAL CANCER SCREENING (COLONOSCOPY - Required) (Every 5 Years)  Last completed: 2022   AUG 8  2027 LIPID (Every 5 Years)  Last completed: Aug 8, 2022   AUG 8  2027 ADVANCE CARE PLANNING (Every 5 Years)  Last completed: Aug 8, 2022   MAY 11  2028 DTAP/TDAP/TD VACCINE (4 - Td or Tdap)  Last completed: May 11, 2018       HISTORY:  OB History    Para Term  AB Living   0 0 0 0 0 0   SAB IAB Ectopic Multiple Live Births   0 0 0 0 0       Patient Active Problem List   Diagnosis    Allergic    CARDIOVASCULAR SCREENING; LDL GOAL LESS THAN 160    Anxiety    Environmental allergies    Tree nut allergy    ASCUS with positive high risk HPV cervical    Cervical high risk HPV (human papillomavirus) test positive    Annual physical exam    Cervical cancer screening    Recurrent UTI    Screening for STD (sexually transmitted disease)    Perioral dermatitis    Urinary frequency    Urinary urgency    Menopausal syndrome (hot flashes)     Past Surgical History:   Procedure Laterality Date    BIOPSY  2013    moles removed by Naz and biopsied; benign    COLONOSCOPY N/A 2022    Procedure: COLONOSCOPY, WITH POLYPECTOMY AND BIOPSY;  Surgeon: Joie Barraza MD;  Location:  GI    CYSTOSCOPY      TN BREAST AUGMENTATION Bilateral     ZZC NONSPECIFIC PROCEDURE      wisdom teeth removed      Social History     Tobacco Use    Smoking status: Never    Smokeless tobacco: Never   Substance Use Topics    Alcohol use: Yes     Comment: occasionaly      Problem (# of Occurrences)  Relation (Name,Age of Onset)    Heart Failure (1) Mother (April)    Diabetes (3) Mother (April): Type II, Father (Minesh): Type II, Maternal Uncle    Cerebrovascular Disease (1) Maternal Grandmother    Breast Cancer (1) Mother (April): Diagnosed Aug 2012; double mast + chem; remiss.    Prostate Cancer (1) Father (Minesh)    C.A.D. (1) Maternal Grandmother           Negative family history of: Hypertension, Cancer - colorectal              Current Outpatient Medications   Medication Sig Dispense Refill    Ascorbic Acid (VITAMIN C PO) Take by mouth daily      Cholecalciferol (VITAMIN D PO) Take 1,000 Units by mouth daily      EPINEPHrine (ANY BX GENERIC EQUIV) 0.3 MG/0.3ML injection 2-pack Inject 0.3 mLs (0.3 mg) into the muscle as needed for anaphylaxis. May repeat one time in 5-15 minutes if response to initial dose is inadequate. 1 each 1    ferrous sulfate (FEROSUL) 325 (65 Fe) MG tablet Take 325 mg by mouth daily (with breakfast)      fluconazole (DIFLUCAN) 150 MG tablet Take 1 tablet (150 mg) by mouth every 3 days. 4 tablet 1    itraconazole (SPORANOX) 100 MG capsule Take 100 mg by mouth daily with food.      lactobacillus rhamnosus, GG, (CULTURELL) capsule Take 1 capsule by mouth 2 times daily      LORazepam (ATIVAN) 0.5 MG tablet 1-2 tablets once daily as needed for anxiety 12 tablet 0    nitroFURantoin macrocrystal-monohydrate (MACROBID) 100 MG capsule Take 1 capsule (100 mg) by mouth daily as needed (post coital antibiotic for UTI prevention). 30 capsule 1    progesterone (PROMETRIUM) 100 MG capsule take 1 capsule by mouth every day at night      ZYRTEC 10 MG OR TABS 1 TABLET DAILY 30 0     No current facility-administered medications for this visit.     Facility-Administered Medications Ordered in Other Visits   Medication Dose Route Frequency Provider Last Rate Last Admin    lidocaine (XYLOCAINE) 2 % external gel   Urethral Once Angelia Barajas MD         Allergies   Allergen Reactions    Tree Nuts  "[Nuts] Anaphylaxis    Latex     Quinolones        Past medical, surgical, social and family histories were reviewed and updated in EPIC.    ROS:   12 point review of systems negative other than symptoms noted below or in the HPI.  No urinary frequency or dysuria, bladder or kidney problems    EXAM:  /64   Ht 1.562 m (5' 1.5\")   Wt 50.1 kg (110 lb 6.4 oz)   LMP 05/06/2025 (Exact Date)   BMI 20.52 kg/m     BMI: Body mass index is 20.52 kg/m .    PHYSICAL EXAM:  Constitutional:   Appearance: Well nourished, well developed, alert, in no acute distress  Breasts: Inspection of Breasts:  No lymphadenopathy present., Palpation of Breasts and Axillae:  No masses present on palpation, no breast tenderness., Axillary Lymph Nodes:  No lymphadenopathy present., No nodularity, asymmetry or nipple discharge bilaterally., and implants in place bilaterally  Lymphatic: Lymph Nodes:  No other lymphadenopathy present  Skin:  General Inspection:  No rashes present, no lesions present, no areas of  discoloration  Neurologic:    Mental Status:  Oriented X3.  Normal strength and tone, sensory exam                grossly normal, mentation intact and speech normal.    Psychiatric:   Mentation appears normal and affect normal/bright.         Pelvic Exam:  External Genitalia:     Normal appearance for age, no discharge present, no tenderness present, no inflammatory lesions present, color normal  Vagina:     Normal vaginal vault without central or paravaginal defects, no discharge present, no inflammatory lesions present, no masses present  Bladder:     Nontender to palpation  Urethra:   Urethral Body:  Urethra palpation normal, urethra structural support normal   Urethral Meatus:  No erythema or lesions present  Cervix:     Appearance healthy, no lesions present, nontender to palpation, no bleeding present  Uterus:     Uterus: firm, normal sized and nontender, midplane in position.   Adnexa:     No adnexal tenderness present, no " adnexal masses present  Perineum:     Perineum within normal limits, no evidence of trauma, no rashes or skin lesions present  Anus:     Anus within normal limits, no hemorrhoids present  Inguinal Lymph Nodes:     No lymphadenopathy present  Pubic Hair:     Normal pubic hair distribution for age  Genitalia and Groin:     No rashes present, no lesions present, no areas of discoloration, no masses present    COUNSELING:   Special attention given to:        Regular exercise       Healthy diet/nutrition       (Ladonna)menopause management    BMI: Body mass index is 20.52 kg/m .      ASSESSMENT:  49 year old female with satisfactory annual exam.    ICD-10-CM    1. Encounter for gynecological examination without abnormal finding  Z01.419       2. Screening for cervical cancer  Z12.4 HPV and Gynecologic Cytology Panel - Recommended Age 30 - 65 Years      3. Tree nut allergy  Z91.018 EPINEPHrine (ANY BX GENERIC EQUIV) 0.3 MG/0.3ML injection 2-pack      4. Chronic vaginitis  N76.1 fluconazole (DIFLUCAN) 150 MG tablet      5. Anxiety  F41.9 LORazepam (ATIVAN) 0.5 MG tablet      6. Immunity status testing  Z01.84 Rubeola Antibody IgG     Rubella Antibody IgG     Mumps Immune Status, IgG     Rubeola Antibody IgG     Rubella Antibody IgG     Mumps Immune Status, IgG      7. Screening for thyroid disorder  Z13.29 TSH with free T4 reflex     TSH with free T4 reflex      8. Screening for metabolic disorder  Z13.228 Comprehensive metabolic panel     Hemoglobin A1c     Comprehensive metabolic panel     Hemoglobin A1c      9. Encounter for lipid screening for cardiovascular disease  Z13.220 Lipid Profile    Z13.6 Lipid Profile      10. Screening for disorder of blood and blood-forming organs  Z13.0 CBC with platelets     CBC with platelets          PLAN:  49-year-old perimenopausal female with normal GYN exam.  We encouraged her to restart her vaginal estrogen tablets.  Will be completed today.  Medications were refilled and she is to  continue with annual mammograms.  Pap smear was collected and if it is normal she can repeat in 3 years.    GEORGES Patel CNP

## 2025-05-29 ENCOUNTER — RESULTS FOLLOW-UP (OUTPATIENT)
Dept: OBGYN | Facility: CLINIC | Age: 50
End: 2025-05-29

## 2025-05-29 LAB
ALBUMIN SERPL BCG-MCNC: 4.7 G/DL (ref 3.5–5.2)
ALP SERPL-CCNC: 34 U/L (ref 40–150)
ALT SERPL W P-5'-P-CCNC: 14 U/L (ref 0–50)
ANION GAP SERPL CALCULATED.3IONS-SCNC: 13 MMOL/L (ref 7–15)
AST SERPL W P-5'-P-CCNC: 22 U/L (ref 0–45)
BILIRUB SERPL-MCNC: 0.5 MG/DL
BUN SERPL-MCNC: 11.9 MG/DL (ref 6–20)
CALCIUM SERPL-MCNC: 9.2 MG/DL (ref 8.8–10.4)
CHLORIDE SERPL-SCNC: 103 MMOL/L (ref 98–107)
CHOLEST SERPL-MCNC: 192 MG/DL
CREAT SERPL-MCNC: 0.64 MG/DL (ref 0.51–0.95)
EGFRCR SERPLBLD CKD-EPI 2021: >90 ML/MIN/1.73M2
FASTING STATUS PATIENT QL REPORTED: YES
FASTING STATUS PATIENT QL REPORTED: YES
GLUCOSE SERPL-MCNC: 90 MG/DL (ref 70–99)
HCO3 SERPL-SCNC: 22 MMOL/L (ref 22–29)
HDLC SERPL-MCNC: 61 MG/DL
HPV HR 12 DNA CVX QL NAA+PROBE: NEGATIVE
HPV16 DNA CVX QL NAA+PROBE: NEGATIVE
HPV18 DNA CVX QL NAA+PROBE: NEGATIVE
HUMAN PAPILLOMA VIRUS FINAL DIAGNOSIS: NORMAL
LDLC SERPL CALC-MCNC: 117 MG/DL
NONHDLC SERPL-MCNC: 131 MG/DL
POTASSIUM SERPL-SCNC: 4.2 MMOL/L (ref 3.4–5.3)
PROT SERPL-MCNC: 7.2 G/DL (ref 6.4–8.3)
SODIUM SERPL-SCNC: 138 MMOL/L (ref 135–145)
TRIGL SERPL-MCNC: 69 MG/DL
TSH SERPL DL<=0.005 MIU/L-ACNC: 0.89 UIU/ML (ref 0.3–4.2)

## 2025-06-02 LAB
BKR AP ASSOCIATED HPV REPORT: NORMAL
BKR LAB AP GYN ADEQUACY: NORMAL
BKR LAB AP GYN INTERPRETATION: NORMAL
BKR LAB AP PREVIOUS ABNL DX: NORMAL
BKR LAB AP PREVIOUS ABNORMAL: NORMAL
PATH REPORT.COMMENTS IMP SPEC: NORMAL
PATH REPORT.COMMENTS IMP SPEC: NORMAL
PATH REPORT.RELEVANT HX SPEC: NORMAL

## 2025-07-09 DIAGNOSIS — N39.0 RECURRENT UTI: ICD-10-CM

## 2025-07-09 RX ORDER — NITROFURANTOIN 25; 75 MG/1; MG/1
100 CAPSULE ORAL DAILY PRN
Qty: 30 CAPSULE | Refills: 0 | Status: SHIPPED | OUTPATIENT
Start: 2025-07-09

## 2025-08-06 DIAGNOSIS — N39.0 RECURRENT UTI: ICD-10-CM

## 2025-08-06 RX ORDER — NITROFURANTOIN 25; 75 MG/1; MG/1
100 CAPSULE ORAL DAILY PRN
Qty: 30 CAPSULE | Refills: 0 | Status: SHIPPED | OUTPATIENT
Start: 2025-08-06

## (undated) RX ORDER — FENTANYL CITRATE 50 UG/ML
INJECTION, SOLUTION INTRAMUSCULAR; INTRAVENOUS
Status: DISPENSED
Start: 2022-06-13